# Patient Record
Sex: MALE | Race: WHITE | Employment: OTHER | URBAN - METROPOLITAN AREA
[De-identification: names, ages, dates, MRNs, and addresses within clinical notes are randomized per-mention and may not be internally consistent; named-entity substitution may affect disease eponyms.]

---

## 2017-01-17 ENCOUNTER — ANTI-COAG VISIT (OUTPATIENT)
Dept: PHARMACY | Facility: CLINIC | Age: 82
End: 2017-01-17

## 2017-01-17 LAB — INR BLD: 3.1

## 2017-02-28 ENCOUNTER — ANTI-COAG VISIT (OUTPATIENT)
Dept: PHARMACY | Facility: CLINIC | Age: 82
End: 2017-02-28

## 2017-02-28 LAB — INR BLD: 2.2

## 2017-04-11 ENCOUNTER — ANTI-COAG VISIT (OUTPATIENT)
Dept: PHARMACY | Facility: CLINIC | Age: 82
End: 2017-04-11

## 2017-04-11 LAB — INR BLD: 2.2

## 2017-05-22 ENCOUNTER — INITIAL CONSULT (OUTPATIENT)
Dept: SURGERY | Age: 82
End: 2017-05-22

## 2017-05-22 VITALS
DIASTOLIC BLOOD PRESSURE: 80 MMHG | BODY MASS INDEX: 40.52 KG/M2 | WEIGHT: 283 LBS | SYSTOLIC BLOOD PRESSURE: 134 MMHG | HEIGHT: 70 IN

## 2017-05-22 DIAGNOSIS — D48.5 NEOPLASM OF UNCERTAIN BEHAVIOR OF SKIN: Primary | ICD-10-CM

## 2017-05-22 PROCEDURE — 99202 OFFICE O/P NEW SF 15 MIN: CPT | Performed by: SURGERY

## 2017-05-23 ENCOUNTER — ANTI-COAG VISIT (OUTPATIENT)
Dept: PHARMACY | Facility: CLINIC | Age: 82
End: 2017-05-23

## 2017-05-23 LAB — INR BLD: 2.5

## 2017-05-24 ENCOUNTER — HOSPITAL ENCOUNTER (OUTPATIENT)
Dept: SURGERY | Age: 82
Discharge: HOME | End: 2017-05-24
Attending: SURGERY | Admitting: SURGERY

## 2017-05-24 VITALS
RESPIRATION RATE: 18 BRPM | TEMPERATURE: 97 F | SYSTOLIC BLOOD PRESSURE: 176 MMHG | OXYGEN SATURATION: 99 % | WEIGHT: 283 LBS | DIASTOLIC BLOOD PRESSURE: 72 MMHG | HEART RATE: 67 BPM | BODY MASS INDEX: 39.62 KG/M2 | HEIGHT: 71 IN

## 2017-05-24 PROCEDURE — 12032 INTMD RPR S/A/T/EXT 2.6-7.5: CPT | Performed by: SURGERY

## 2017-05-24 PROCEDURE — 11603 EXC TR-EXT MAL+MARG 2.1-3 CM: CPT | Performed by: SURGERY

## 2017-05-24 RX ORDER — OXYCODONE HYDROCHLORIDE 5 MG/1
TABLET ORAL
Qty: 15 TABLET | Refills: 0 | Status: SHIPPED | OUTPATIENT
Start: 2017-05-24 | End: 2018-05-10

## 2017-05-24 ASSESSMENT — PAIN SCALES - GENERAL: PAINLEVEL_OUTOF10: 0

## 2017-05-25 ENCOUNTER — TELEPHONE (OUTPATIENT)
Dept: SURGERY | Age: 82
End: 2017-05-25

## 2017-06-04 ENCOUNTER — TELEPHONE (OUTPATIENT)
Dept: SURGERY | Age: 82
End: 2017-06-04

## 2017-06-27 ENCOUNTER — ANTI-COAG VISIT (OUTPATIENT)
Dept: PHARMACY | Facility: CLINIC | Age: 82
End: 2017-06-27

## 2017-06-27 LAB — INR BLD: 2

## 2017-08-08 ENCOUNTER — HOSPITAL ENCOUNTER (OUTPATIENT)
Dept: OTHER | Age: 82
Discharge: HOME | End: 2017-08-31
Attending: INTERNAL MEDICINE | Admitting: INTERNAL MEDICINE

## 2017-08-08 ENCOUNTER — ANTI-COAG VISIT (OUTPATIENT)
Dept: PHARMACY | Facility: CLINIC | Age: 82
End: 2017-08-08

## 2017-08-08 LAB — INR BLD: 2.6

## 2017-10-01 ENCOUNTER — HOSPITAL ENCOUNTER (OUTPATIENT)
Dept: OTHER | Age: 82
Discharge: HOME | End: 2017-10-31
Attending: INTERNAL MEDICINE | Admitting: INTERNAL MEDICINE

## 2017-10-03 ENCOUNTER — ANTI-COAG VISIT (OUTPATIENT)
Dept: PHARMACY | Facility: CLINIC | Age: 82
End: 2017-10-03

## 2017-10-03 LAB — INR BLD: 2.2

## 2017-10-03 NOTE — PROGRESS NOTES
Patient presents with history of A-Fib and recurrent DVT , on long term anticoagulation with warfarin. He is here for anticoagulation monitoring and adjustment. 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 Denies bleeding/bruising  Denies blood in urine/stool  No changes in Rx/OTC/Herbal supplementation. Patient is not a smoker, he did state that he drinks scotch every night. Pt denies any missed doses. Pt indicates that he is not a big vegetable eater. INR 2.2  is within acceptable therapeutic range (goal range of 2-3). Recommend to continue 3 mg daily, except 2 mg on Mondays. He has Warfarin 4, 3, and 2 mg tablets at home. Will continue to monitor and check INR in 6 weeks, per pt request.  Dosing card with dose and next appt time was given.   Return to clinic: 11/14 @ 1:00 pm    Chas May PharmD 10/3/2017 12:55 PM

## 2017-10-05 ENCOUNTER — HOSPITAL ENCOUNTER (OUTPATIENT)
Dept: WOUND CARE | Age: 82
Discharge: HOME | End: 2017-10-05
Attending: SURGERY | Admitting: SURGERY

## 2017-10-05 VITALS
RESPIRATION RATE: 24 BRPM | TEMPERATURE: 98.4 F | HEART RATE: 55 BPM | HEIGHT: 70 IN | BODY MASS INDEX: 41.7 KG/M2 | DIASTOLIC BLOOD PRESSURE: 81 MMHG | WEIGHT: 291.25 LBS | SYSTOLIC BLOOD PRESSURE: 185 MMHG

## 2017-10-05 DIAGNOSIS — L97.909 ARTERIAL LEG ULCER (HCC): ICD-10-CM

## 2017-10-05 DIAGNOSIS — L97.909 VENOUS STASIS ULCERS (HCC): Chronic | ICD-10-CM

## 2017-10-05 DIAGNOSIS — I83.009 VENOUS STASIS ULCERS (HCC): Chronic | ICD-10-CM

## 2017-10-05 DIAGNOSIS — I50.9 CHRONIC CONGESTIVE HEART FAILURE, UNSPECIFIED CONGESTIVE HEART FAILURE TYPE: Primary | ICD-10-CM

## 2017-10-05 DIAGNOSIS — I87.2 VENOUS INSUFFICIENCY: ICD-10-CM

## 2017-10-05 NOTE — H&P
remainder of the ROS was reviewed and is negative. Objective:     Vitals:    10/05/17 1245   BP: (!) 185/81   Pulse: 55   Resp: 24   Temp: 98.4 °F (36.9 °C)   TempSrc: Oral   Weight: 291 lb 4 oz (132.1 kg)   Height: 5' 10\" (1.778 m)       Constitutional:  well-developed, well-nourished  Psychiatric:  oriented to person, place and time; mood and affect appropriate for the situation   Eyes:  pupils equal, round and reactive to light; sclerae anicteric, conjunctivae not pale  Cardiovascular:  bilateral pedal pulses palpable; no lower extremity edema  Lymphatic:  no inguinal or popliteal adenopathy   Musculoskeletal:  no clubbing, cyanosis or petechiae; RLE and LLE with no gross effusions, joint misalignment or acute arthritis  Meron-ulcer skin:  Intact, Induration, Maceration and Erythema. Ulcer(s):  open, erythema, induration and good granulation. Today's ulcer measurements are in the electronic flowsheet. Photos also saved in electronic chart. Lab Results   Component Value Date    LABALBU 4.0 01/14/2016     Lab Results   Component Value Date    CREATININE 1.9 (H) 01/15/2016     Lab Results   Component Value Date    HGB 13.5 01/15/2016     Lab Results   Component Value Date    LABA1C 5.3 05/17/2011     Assessment:     Patient Active Problem List   Diagnosis Code    Status post hip replacement Z96.649    CHF (congestive heart failure) ([facility]) I50.9    Atrial fibrillation ([facility]) I48.91    Cellulitis L03.90    Long term current use of anticoagulant therapy Z79.01    DVT (deep venous thrombosis) ([facility]) I82.409    Atrial flutter with rapid ventricular response ([facility]) I48.92    Anticoagulated on Coumadin Z51.81, Z79.01    CHF exacerbation ([facility]) I50.9    Renal insufficiency N28.9    Neoplasm of uncertain behavior of skin D48.5       Assessment of today's active condition(s): Shira Avila  Factors contributing to occurrence and/or persistence of the chronic ulcer include edema, venous stasis, lymphedema and decreased mobility. Medical necessity of today's visit is shown by the above documentation. Procedure note: None, There is no necrotic tissue       Discharge plan:     Treatment in the wound care center today:  . Home treatment: good handwashing before and after any dressing changes. Cleanse ulcer with saline or soap & water before dressing change. May use Vaseline (petrolatum), Aquaphor, Aveeno, CeraVe, Cetaphil, Eucerin, Lubriderm, etc for dry skin. Dressing type for home: alginate, daily. Written discharge instructions given to patient. Follow up in 1 week.     Electronically signed by Stephon Monae MD on 10/5/2017 at 1:31 PM.

## 2017-10-05 NOTE — IP AVS SNAPSHOT
After Visit Summary  (Discharge Instructions)    Medication List for Home    Based on the information you provided to us as well as any changes during this visit, the following is your updated medication list.  Compare this with your prescription bottles at home. If you have any questions or concerns, contact your primary care physician's office. Daily Medication List (This medication list can be shared with any healthcare provider who is helping you manage your medications)      ASK your doctor about these medications if you have questions        Last Dose    Next Dose Due AM NOON PM NIGHT    aspirin 81 MG tablet   Take 81 mg by mouth daily                                         atenolol 25 MG tablet   Commonly known as:  TENORMIN   Take 25 mg by mouth daily. furosemide 20 MG tablet   Commonly known as:  LASIX   Take 20 mg by mouth 2 times daily                                         oxyCODONE 5 MG immediate release tablet   Commonly known as:  ROXICODONE   Take 1 tablet PO every 4 hours as needed for pain. .                                         simvastatin 20 MG tablet   Commonly known as:  ZOCOR   Take 20 mg by mouth nightly. warfarin 3 MG tablet   Commonly known as:  COUMADIN   Take 1 tablet by mouth daily Or as directed by coumadin clinic                                         warfarin 2 MG tablet   Commonly known as:  COUMADIN   Take 2 or 3 mg daily as directed by coumadin clinic.                                                  Allergies as of 10/5/2017        Reactions    Sulfa Antibiotics     Joint swelling      Immunizations as of 10/5/2017     Name Date Dose VIS Date Route    Influenza Virus Vaccine 9/20/2012 0.5 mL 7/26/2011 Intramuscular    Influenza Virus Vaccine 11/2/2011 -- -- --    Pneumococcal Conjugate 7-valent 11/9/2009 -- -- --      Last Vitals          Most Recent Value Please call your providers to confirm appointments. It is important to keep your appointments. Please bring your current insurance card, photo ID, co-pay, and all medication bottles to your appointment. If self-pay, payment is expected at the time of service. Future Appointments     10/5/2017     Imaging:  VL DUP LOWER EXTREMITY ARTERIES BILATERAL        10/5/2017     Imaging:  VL Extremity Venous Bilateral        11/14/2017 1:00 PM     Appointment with Franc Garrett at [address] [place] [address] ([phone])   [address] [place] [address], [address]  [address] [place] [address]         Preventive Care        Date Due    Tetanus Combination Vaccine (1 - Tdap) 1/24/1955    Zoster Vaccine 1/24/1996    Yearly Flu Vaccine (1) 9/1/2017                 Care Plan Once You Return Home    This section includes instructions you will need to follow once you leave the hospital.  Your care team will discuss these with you, so you and those caring for you know how to best care for your health needs at home. This section may also include educational information about certain health topics that may be of help to you. Discharge [facility] Physician Orders and Discharge [facility]  [address], [address]  [place], [facility]  Telephone: [phone]      Fax: [phone]          Your wound-care supplies will be provided by: [address]    Please begin [address] [employer] [facility] Today. Wound care nurse, please fax face sheet, wound measurements, and AVS today     . NAME:  Tina Solares   YOB: 1935  PRIMARY DIAGNOSIS FOR WOUND CARE CENTER:  Venous Insufficiency .     ANESTHETIC APPLIED IN [address] [place] [address],[address] TODAY:None    Cellular or tissue product history, if applicable:      None    Prior testing or prescriptions, if applicable:      None      Wound cleansing: Do not scrub or use excessive force. Wash hands with soap and water before and after dressing changes. Prior to applying a clean dressing, cleanse wound with normal saline, wound cleanser, or mild soap and water. Ask your physician or nurse before getting the wound(s) wet in the shower. Wound care for home:     BOTH LOWER LEGS:                (Open wound of right lower leg)              Apply Aquaphor or moisturizing lotion to both legs/feet, not between toes,   Calcium Alginate with Silver to wound               Dry gauze squares              Multiple ABD pads              Kerlix roll gauze              Once daily    Apply double layer, low compression Tubigrips to both legs, toes to knees,  Apply in AM, remove at bedtime    Substituted dressings applied in the [address] [place] [address],[address] today, if applicable:     Aquacel Ag for Calcium Alginate with Silver    New orders for this week (labs, imaging, medications, etc.):    Please schedule both venous duplex and arterial duplex -Bilateral Lower extremities, at [facility] the patient to bring dressing to wrap legs after the exams are performed. DX:  Non healing wound, leg(s), venous insufficiency, r/o arterial disease        Dr Santos Simmons DPM   for routine foot care and toe nail cutting    [facility] [address]   [address] [facility] [address]    Phone [phone]  Fax [phone]    Office hours: Tuesday and Wednesday afternoon 1-4pm                         Friday 9am - 4pm    Additional instructions for specific diagnoses:     General comments for venous / lymphedema ulcers: *  Elevate your legs to the level of your heart for at least 30 minutes, several times daily. *  Walk as much as you can tolerate. Avoid standing for long periods of time, but if you must stand, regularly do heel-raises and calf-pump exercises. *  If you have compression wraps designed to remain in place all week, be sure to keep them from getting wet.

## 2017-10-05 NOTE — IP AVS SNAPSHOT
Patient Information     Patient Name THUY Vasquez 1935         This is your updated medication list to keep with you all times      ASK your doctor about these medications     aspirin 81 MG tablet       atenolol 25 MG tablet   Commonly known as:  TENORMIN       furosemide 20 MG tablet   Commonly known as:  LASIX       oxyCODONE 5 MG immediate release tablet   Commonly known as:  ROXICODONE   Take 1 tablet PO every 4 hours as needed for pain. .       simvastatin 20 MG tablet   Commonly known as:  ZOCOR       * warfarin 3 MG tablet   Commonly known as:  COUMADIN   Take 1 tablet by mouth daily Or as directed by coumadin clinic       * warfarin 2 MG tablet   Commonly known as:  COUMADIN   Take 2 or 3 mg daily as directed by coumadin clinic. * Notice: This list has 2 medication(s) that are the same as other medications prescribed for you. Read the directions carefully, and ask your doctor or other care provider to review them with you.

## 2017-10-05 NOTE — IP AVS SNAPSHOT
Patient Information     Patient Name THUY Cantu 1935      WARFARIN INFORMATION       What is the most important information you should know about warfarin? Warfarin is a medicine that you take to prevent blood clots. It is often called a blood thinner. Doctors give warfarin (such as Coumadin) to reduce the risk of blood clots. Warfarin/Coumadin Instructions:  ? It is important to take your anticoagulant medication as instructed, and notify your physician if you are unable to for any reason. ? Complete any blood tests ordered for monitoring your medication; such as PT/INR, so your physician can adjust the dose if necessary. ? Eat a consistent amount of foods with Vitamin K, such as leafy greens. ? Avoid major changes in your dietary habits, or notify your health professional before changing habits. ? Follow up with your health care provider or clinic as directed by your physician for monitoring your anticoagulant medication. ? Diet and medication can affect your anticoagulant and PT/INR blood test.   ? Do not take or discontinue any medication or over-the-counter medication except on the advice of your physician or pharmacist.   ? Anticoagulants can increase the risk of bleeding.

## 2017-10-05 NOTE — IP AVS SNAPSHOT
Patient Information     Patient Name THUY Schneider 1935      Important Information for Heart Failure       If your condition worsens or if you have any concerns, call your doctor or seek emergency medical services (dial 9--) as needed. If you have any of the following symptoms/conditions, call your doctor. Call your primary care physician to obtain results of outstanding lab tests, cultures, x-rays, or other tests. If you have a current diagnosis or history of any of the following, please review the information carefully. HEART FAILURE PATIENTS:   DISCHARGE WEIGHT: Weight: 291 lb 4 oz (132.1 kg)  Record daily weights. Notify your doctor if you have a weight gain 2 pounds in a day, or 3-5 pounds in 1 week. Notify your doctor for increased shortness of breath, or swelling in legs or feet. Follow a low sodium diet. Resume normal activity unless otherwise instructed by your doctor.

## 2017-10-12 ENCOUNTER — HOSPITAL ENCOUNTER (OUTPATIENT)
Dept: WOUND CARE | Age: 82
Discharge: HOME | End: 2017-10-12
Attending: SURGERY | Admitting: SURGERY

## 2017-10-12 ENCOUNTER — HOSPITAL ENCOUNTER (OUTPATIENT)
Dept: VASCULAR LAB | Age: 82
Discharge: HOME | End: 2017-10-12
Attending: SURGERY | Admitting: SURGERY

## 2017-10-12 DIAGNOSIS — I77.1 STRICTURE OF ARTERY (HCC): ICD-10-CM

## 2017-10-12 DIAGNOSIS — I87.2 VENOUS INSUFFICIENCY (CHRONIC) (PERIPHERAL): ICD-10-CM

## 2017-10-12 NOTE — PROGRESS NOTES
Call to patient, no show for 12:45 appointment today. He did arrive for earlier scheduled vascular exam.  Left message on  to return call and confirm appointment for next Thursday.

## 2017-10-19 ENCOUNTER — HOSPITAL ENCOUNTER (OUTPATIENT)
Dept: WOUND CARE | Age: 82
Discharge: HOME | End: 2017-10-19
Attending: SURGERY | Admitting: SURGERY

## 2017-10-19 ENCOUNTER — HOSPITAL ENCOUNTER (OUTPATIENT)
Dept: VASCULAR LAB | Age: 82
Discharge: HOME | End: 2017-10-19
Attending: SURGERY | Admitting: SURGERY

## 2017-10-19 VITALS
HEIGHT: 70 IN | BODY MASS INDEX: 40.83 KG/M2 | SYSTOLIC BLOOD PRESSURE: 167 MMHG | HEART RATE: 60 BPM | DIASTOLIC BLOOD PRESSURE: 70 MMHG | WEIGHT: 285.2 LBS | TEMPERATURE: 98.2 F | RESPIRATION RATE: 24 BRPM

## 2017-10-19 DIAGNOSIS — I77.1 STRICTURE OF ARTERY (HCC): ICD-10-CM

## 2017-10-19 DIAGNOSIS — I89.0 LYMPHEDEMA: ICD-10-CM

## 2017-10-19 DIAGNOSIS — L97.921 CHRONIC ULCER OF LEFT LEG, LIMITED TO BREAKDOWN OF SKIN (HCC): ICD-10-CM

## 2017-10-19 PROCEDURE — 99214 OFFICE O/P EST MOD 30 MIN: CPT | Performed by: INTERNAL MEDICINE

## 2017-10-19 NOTE — PLAN OF CARE
Problem: Impaired Skin Integrity  Goal: Wound size and drainage will decrease, surrounding tissue healthy and intact.   Wound not debrided today, compression garments ordered per Margret, continue elevation of BLE, f/u x 1 week, MD orders/D/C instructions reviewed with patient, all questions answered; copy of instructions given to patient

## 2017-10-26 ENCOUNTER — HOSPITAL ENCOUNTER (OUTPATIENT)
Dept: WOUND CARE | Age: 82
Discharge: HOME | End: 2017-10-26
Attending: SURGERY | Admitting: SURGERY

## 2017-10-26 VITALS
TEMPERATURE: 98.4 F | WEIGHT: 282 LBS | BODY MASS INDEX: 40.37 KG/M2 | SYSTOLIC BLOOD PRESSURE: 189 MMHG | HEIGHT: 70 IN | DIASTOLIC BLOOD PRESSURE: 85 MMHG | RESPIRATION RATE: 20 BRPM | HEART RATE: 58 BPM

## 2017-10-27 PROBLEM — L97.921 CHRONIC ULCER OF LEFT LEG, LIMITED TO BREAKDOWN OF SKIN (HCC): Status: ACTIVE | Noted: 2017-10-27

## 2017-10-27 PROBLEM — I83.009 VENOUS STASIS ULCERS (HCC): Chronic | Status: RESOLVED | Noted: 2017-10-05 | Resolved: 2017-10-27

## 2017-10-27 PROBLEM — L97.909 VENOUS STASIS ULCERS (HCC): Chronic | Status: RESOLVED | Noted: 2017-10-05 | Resolved: 2017-10-27

## 2017-10-27 PROBLEM — I89.0 LYMPHEDEMA: Status: ACTIVE | Noted: 2017-10-27

## 2017-10-27 NOTE — PROGRESS NOTES
[facility] Progress Note    Jackie Borges     : 1935    DATE OF VISIT:  10/26/2017    Subjective:     Jackie Borges is a 81 y.o. male who has a venous ulcer located on the both legs    Significant symptoms or pertinent wound history since last visit:-  The wounds are healed. The legs are less swollen . Time was spent to reiterate preventive measures. We offer lymphatic drainage therapy he will think about it and will call us if he decided to go do it. Additional ulcer(s) noted? no.     His current medication list consists of aspirin, atenolol, furosemide, oxyCODONE, simvastatin, and warfarin. Allergies: Sulfa antibiotics    Objective:     Vitals:    10/26/17 1305   BP: (!) 189/85   Pulse: 58   Resp: 20   Temp: 98.4 °F (36.9 °C)   TempSrc: Oral   Weight: 282 lb (127.9 kg)   Height: 5' 10\" (1.778 m)       Meron-ulcer skin: Intact, Induration, Brown and cobblestoning. Jass Joe Ulcer(s): healed. Pre-debridement wound measurements are in the wound documentation flowsheet. Photos also saved in electronic chart. Assessment:     Patient Active Problem List   Diagnosis Code    Status post hip replacement Z96.649    CHF (congestive heart failure) ([facility]) I50.9    Atrial fibrillation ([facility]) I48.91    Cellulitis L03.90    Long term current use of anticoagulant therapy Z79.01    DVT (deep venous thrombosis) ([facility]) I82.409    Atrial flutter with rapid ventricular response ([facility]) I48.92    Anticoagulated on Coumadin Z51.81, Z79.01    CHF exacerbation ([facility]) I50.9    Renal insufficiency N28.9    Neoplasm of uncertain behavior of skin D48.5    Venous stasis ulcers ([facility]) I83.009, L97.909    Arterial leg ulcer ([facility] L97.909    Venous insufficiency I87.2       Assessment of today's active condition(s): Jass Joe Factors contributing to occurrence and/or persistence of the chronic ulcer include edema, venous stasis, lymphedema, decreased mobility, obesity and decreased tissue oxygenation.  Medical

## 2017-10-27 NOTE — PROGRESS NOTES
[address] [facility] Progress Note    Roshan Senior     : 1935    DATE OF VISIT:  10/19/2017    Subjective:     Roshan Senior is a 81 y.o. male who has a venous and lymphedema ulcer located on the left leg. Current complaint of pain in this ulcer? yes. Quality of pain: aching and burning  Timing: intermittent, stable  Severity: mild  Associated Signs/Symptoms:  edema and drainage  Other significant symptoms or pertinent ulcer history: swelling not much different this week, but wound drainage less, no pruritus, no fever. Additional ulcer(s) noted? no.  Right side healed. Mr. Dianna Hairston has a past medical history of Arthritis; Atrial flutter with rapid ventricular response ([address] Blood transfusion; Cellulitis; CHF (congestive heart failure) (HCC); CHF exacerbation ([address] DVT (deep venous thrombosis) ([address] Gout; Hyperlipidemia; Hypertension; Irregular heart beat; and Prostate cancer ([facility] He has a past surgical history that includes ostate surgery; Cholecystectomy; joint replacement (11); eye surgery (13); and other surgical history (2017). His family history includes Arthritis in his sister; Asthma in his brother; Heart Disease in his mother. Mr. Dianna Hairston reports that he quit smoking about 47 years ago. His smoking use included Pipe. He quit smokeless tobacco use about 55 years ago. His smokeless tobacco use included Chew. He reports that he does not drink alcohol or use drugs. His current medication list consists of aspirin, atenolol, furosemide, oxyCODONE, simvastatin, and warfarin. Allergies: Sulfa antibiotics    Pertinent items from the review of systems are discussed in the HPI; the remainder of the ROS was reviewed and is negative.      Objective:     Vitals:    10/19/17 1356 10/19/17 1414   BP: (!) 199/82 (!) 167/70   Pulse: 64 60   Resp: 24    Temp: 98.2 °F (36.8 °C)    TempSrc: Oral    Weight: 285 lb 3.2 oz (129.4 kg)    Height: 5' 10\" (1.778 m)        Constitutional:  well-developed, well-nourished, overweight, fatigued, NAD  Psychiatric:  oriented to person, place and time; mood and affect appropriate for the situation   Eyes:  pupils equal, round and reactive to light; sclerae anicteric, conjunctivae not pale  Cardiovascular:  bilateral pedal pulses palpable; severe B/L stage 3 lower extremity lymphedema  Lymphatic:  no inguinal or popliteal adenopathy, no angitis or cellulitis  Musculoskeletal:  no clubbing, cyanosis or petechiae; RLE and LLE with no gross effusions, joint misalignment or acute arthritis  Meron-ulcer skin:  Induration, Warmth and Pink. Ulcer(s):  clean, open, induration, good granulation, healing and moderate, serous drainage. Today's ulcer measurements are in the electronic flowsheet. Photos also saved in electronic chart. Lab Results   Component Value Date    LABALBU 4.0 01/14/2016     Lab Results   Component Value Date    CREATININE 1.9 (H) 01/15/2016     Lab Results   Component Value Date    HGB 13.5 01/15/2016     Lab Results   Component Value Date    LABA1C 5.3 05/17/2011     Arterial Doppler -- essentially normal.    Venous Doppler --  1. Suboptimal lower venous exam due to depth secondary to patient body habitus.   2. Within the limits of this exam, there is no evidence of acute deep or superficial venous thrombosis in the lower extremities bilaterally.   3. There is chronic partially occluding deep venous thrombosis in the bilateral popliteal veins. There is chronic partially occluding superficial venous thrombosis in the right small saphenous vein.   4. There is evidence of deep venous insufficiency involving the left common femoral and popliteal veins.  There is no evidence of deep venous insufficiency in the right lower extremity.   5. There is no evidence of superficial venous insufficiency in the lower extremities bilaterally.   6. There is no evidence of incompetent perforators in the bilateral lower extremities. Assessment:     Patient Active Problem List   Diagnosis Code    Status post hip replacement Z96.649    CHF (congestive heart failure) ([facility]) I50.9    Atrial fibrillation ([facility]) I48.91    DVT (deep venous thrombosis) ([facility]) I82.409    Anticoagulated on Coumadin Z51.81, Z79.01    Renal insufficiency N28.9    Neoplasm of uncertain behavior of skin D48.5    Venous insufficiency I87.2    Lymphedema I89.0    Chronic ulcer of left leg, limited to breakdown of skin ([facility] [address] L97.921       Assessment of today's active condition(s): venous insufficiency, severe secondary lymphedema, healed right and healing left lower leg venous / lymphedema ulcers, no infection or ischemia -- long-term edema control is going to be a challenge. Factors contributing to occurrence and/or persistence of the chronic ulcer include venous stasis, lymphedema, decreased mobility and obesity. Medical necessity of today's visit is shown by the above documentation. Sharp debridement is not indicated today, based upon the exam findings in the wound(s) above. Discharge plan:     Treatment in the wound care center today: Wound 10/05/17 5, right lower leg, venous, partial thickness, onset 9/1/17, gradually appeared healed 10/19/17-Dressing/Treatment: Other (Comment) (dble layer j tubigrip)  Wound 10/19/17 # 6 , Left Lower leg, venous insuff, partial thickness, onset 10/18/17, gradually appeared healed 10/26/17-Dressing/Treatment: Other (Comment) (auqacel ag,mepilex border,dble layer j tubigrip). I reminded the patient of the importance of weight management and smoking cessation, if applicable; also encouraged ambulation as tolerated, additional lower extremity exercises as instructed in our education sheet, leg elevation when at rest, and compliance with any recommended dietary, diuretic and compression therapies.  Discussed various options for long-term compression, and he'd like to try one of the Velcro-based garments, which I do think is a wise choice as opposed to stockings. If ulcers recur or he has further complications of his lymphedema, would recommend that he attend our outpatient lymphedema clinic (PT-OT) 2-3x per week; due to cost and travel, he'd like to hold off on that for now. Home treatment: good handwashing before and after any dressing changes. Cleanse ulcer with saline or soap & water before dressing change. May use Vaseline (petrolatum), Aquaphor, Aveeno, CeraVe, Cetaphil, Eucerin, Lubriderm, etc for dry skin. Dressing type for home: Other: as above, daily. Written discharge instructions given to patient. Follow up in 1 week.     Electronically signed by Spring Henry MD on 10/27/2017 at 7:14 PM.

## 2017-11-01 ENCOUNTER — HOSPITAL ENCOUNTER (OUTPATIENT)
Dept: OTHER | Age: 82
Discharge: HOME | End: 2017-11-30
Attending: INTERNAL MEDICINE | Admitting: INTERNAL MEDICINE

## 2017-11-14 ENCOUNTER — ANTI-COAG VISIT (OUTPATIENT)
Dept: PHARMACY | Facility: CLINIC | Age: 82
End: 2017-11-14

## 2017-11-14 LAB — INR BLD: 2

## 2017-12-01 ENCOUNTER — HOSPITAL ENCOUNTER (OUTPATIENT)
Dept: OTHER | Age: 82
Discharge: HOME | End: 2017-12-31
Attending: INTERNAL MEDICINE | Admitting: INTERNAL MEDICINE

## 2017-12-26 ENCOUNTER — ANTI-COAG VISIT (OUTPATIENT)
Dept: PHARMACY | Facility: CLINIC | Age: 82
End: 2017-12-26

## 2017-12-26 LAB — INR BLD: 1.7

## 2018-01-01 ENCOUNTER — HOSPITAL ENCOUNTER (OUTPATIENT)
Dept: OTHER | Age: 83
Discharge: HOME | End: 2018-01-31
Attending: INTERNAL MEDICINE | Admitting: INTERNAL MEDICINE

## 2018-02-01 ENCOUNTER — HOSPITAL ENCOUNTER (OUTPATIENT)
Dept: OTHER | Age: 83
Discharge: HOME | End: 2018-02-28
Attending: INTERNAL MEDICINE | Admitting: INTERNAL MEDICINE

## 2018-02-06 ENCOUNTER — ANTI-COAG VISIT (OUTPATIENT)
Dept: PHARMACY | Facility: CLINIC | Age: 83
End: 2018-02-06

## 2018-02-06 LAB — INR BLD: 2.5

## 2018-02-06 NOTE — PROGRESS NOTES
Patient presents with history of A-Fib and recurrent DVT , on long term anticoagulation with warfarin. He is here for anticoagulation monitoring and adjustment. 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 Denies bleeding/bruising  Denies blood in urine/stool  No changes in Rx/OTC/Herbal supplementation. Patient is not a smoker, he did state that he drinks scotch every night. Pt denies any missed doses. Pt indicates that he is not a big vegetable eater. INR 2.5 is within acceptable therapeutic range (goal range of 2-3). Recommend to continue 3 mg daily, except 2 mg on Mondays. He has Warfarin 4, 3, and 2 mg tablets at home. Will continue to monitor and check INR in 6 weeks, per pt request.  Dosing card with dose and next appt time was given.   Return to clinic: 3/20 @ 1:00pm

## 2018-03-01 ENCOUNTER — HOSPITAL ENCOUNTER (OUTPATIENT)
Dept: OTHER | Age: 83
Discharge: HOME | End: 2018-03-31
Attending: INTERNAL MEDICINE | Admitting: INTERNAL MEDICINE

## 2018-03-20 ENCOUNTER — ANTI-COAG VISIT (OUTPATIENT)
Dept: PHARMACY | Facility: CLINIC | Age: 83
End: 2018-03-20

## 2018-03-20 LAB — INR BLD: 2.8

## 2018-04-01 ENCOUNTER — HOSPITAL ENCOUNTER (OUTPATIENT)
Dept: OTHER | Age: 83
Discharge: HOME | End: 2018-04-30
Attending: INTERNAL MEDICINE | Admitting: INTERNAL MEDICINE

## 2018-05-01 ENCOUNTER — ANTI-COAG VISIT (OUTPATIENT)
Dept: PHARMACY | Facility: CLINIC | Age: 83
End: 2018-05-01

## 2018-05-01 ENCOUNTER — HOSPITAL ENCOUNTER (OUTPATIENT)
Dept: OTHER | Age: 83
Discharge: HOME | End: 2018-05-31
Attending: INTERNAL MEDICINE | Admitting: INTERNAL MEDICINE

## 2018-05-01 DIAGNOSIS — I48.20 CHRONIC ATRIAL FIBRILLATION (HCC): ICD-10-CM

## 2018-05-01 LAB — INR BLD: 2.5

## 2018-05-05 ENCOUNTER — HOSPITAL ENCOUNTER (OUTPATIENT)
Dept: OTHER | Age: 83
Discharge: HOME | End: 2018-05-05
Attending: INTERNAL MEDICINE | Admitting: INTERNAL MEDICINE

## 2018-05-05 DIAGNOSIS — R05.9 COUGH: ICD-10-CM

## 2018-05-10 ENCOUNTER — HOSPITAL ENCOUNTER (OUTPATIENT)
Dept: WOUND CARE | Age: 83
Discharge: HOME | End: 2018-05-10
Attending: NURSE PRACTITIONER | Admitting: NURSE PRACTITIONER

## 2018-05-10 VITALS
DIASTOLIC BLOOD PRESSURE: 84 MMHG | WEIGHT: 283.5 LBS | BODY MASS INDEX: 40.59 KG/M2 | TEMPERATURE: 99.4 F | RESPIRATION RATE: 24 BRPM | HEIGHT: 70 IN | HEART RATE: 60 BPM | SYSTOLIC BLOOD PRESSURE: 181 MMHG

## 2018-05-10 DIAGNOSIS — L03.115 CELLULITIS OF RIGHT ANTERIOR LOWER LEG: ICD-10-CM

## 2018-05-10 PROCEDURE — 97597 DBRDMT OPN WND 1ST 20 CM/<: CPT | Performed by: NURSE PRACTITIONER

## 2018-05-10 PROCEDURE — 99214 OFFICE O/P EST MOD 30 MIN: CPT | Performed by: NURSE PRACTITIONER

## 2018-05-10 PROCEDURE — 97598 DBRDMT OPN WND ADDL 20CM/<: CPT | Performed by: NURSE PRACTITIONER

## 2018-05-10 RX ORDER — LIDOCAINE HYDROCHLORIDE 40 MG/ML
SOLUTION TOPICAL ONCE
Status: DISCONTINUED | OUTPATIENT
Start: 2018-05-10 | End: 2018-05-11 | Stop reason: HOSPADM

## 2018-05-17 ENCOUNTER — HOSPITAL ENCOUNTER (OUTPATIENT)
Dept: WOUND CARE | Age: 83
Discharge: HOME | End: 2018-05-17
Attending: NURSE PRACTITIONER | Admitting: NURSE PRACTITIONER

## 2018-05-17 VITALS
SYSTOLIC BLOOD PRESSURE: 176 MMHG | HEART RATE: 57 BPM | RESPIRATION RATE: 20 BRPM | HEIGHT: 70 IN | TEMPERATURE: 97.7 F | WEIGHT: 280.4 LBS | BODY MASS INDEX: 40.14 KG/M2 | DIASTOLIC BLOOD PRESSURE: 78 MMHG

## 2018-05-17 DIAGNOSIS — L97.921 CHRONIC ULCER OF LEFT LEG, LIMITED TO BREAKDOWN OF SKIN (HCC): ICD-10-CM

## 2018-05-17 DIAGNOSIS — L03.115 CELLULITIS OF RIGHT ANTERIOR LOWER LEG: ICD-10-CM

## 2018-05-17 PROCEDURE — 97597 DBRDMT OPN WND 1ST 20 CM/<: CPT | Performed by: NURSE PRACTITIONER

## 2018-05-17 RX ORDER — LIDOCAINE 40 MG/G
CREAM TOPICAL ONCE
Status: DISCONTINUED | OUTPATIENT
Start: 2018-05-17 | End: 2018-05-18 | Stop reason: HOSPADM

## 2018-05-19 PROBLEM — L97.921 CHRONIC ULCER OF LEFT LEG, LIMITED TO BREAKDOWN OF SKIN (HCC): Status: ACTIVE | Noted: 2018-05-19

## 2018-05-19 PROBLEM — L97.911 CHRONIC ULCER OF RIGHT LEG, LIMITED TO BREAKDOWN OF SKIN (HCC): Status: ACTIVE | Noted: 2017-10-27

## 2018-05-24 ENCOUNTER — HOSPITAL ENCOUNTER (OUTPATIENT)
Dept: WOUND CARE | Age: 83
Discharge: HOME | End: 2018-05-24
Attending: NURSE PRACTITIONER | Admitting: NURSE PRACTITIONER

## 2018-05-24 VITALS
HEART RATE: 55 BPM | RESPIRATION RATE: 20 BRPM | SYSTOLIC BLOOD PRESSURE: 193 MMHG | BODY MASS INDEX: 37.48 KG/M2 | HEIGHT: 70 IN | TEMPERATURE: 98.5 F | DIASTOLIC BLOOD PRESSURE: 80 MMHG | WEIGHT: 261.8 LBS

## 2018-05-24 DIAGNOSIS — L03.115 CELLULITIS OF RIGHT ANTERIOR LOWER LEG: ICD-10-CM

## 2018-05-24 DIAGNOSIS — L97.921 CHRONIC ULCER OF LEFT LEG, LIMITED TO BREAKDOWN OF SKIN (HCC): ICD-10-CM

## 2018-05-24 PROCEDURE — 99213 OFFICE O/P EST LOW 20 MIN: CPT | Performed by: NURSE PRACTITIONER

## 2018-06-01 ENCOUNTER — HOSPITAL ENCOUNTER (OUTPATIENT)
Dept: OTHER | Age: 83
Discharge: HOME | End: 2018-06-30
Attending: INTERNAL MEDICINE | Admitting: INTERNAL MEDICINE

## 2018-06-12 ENCOUNTER — ANTI-COAG VISIT (OUTPATIENT)
Dept: PHARMACY | Facility: CLINIC | Age: 83
End: 2018-06-12

## 2018-06-12 DIAGNOSIS — I48.20 CHRONIC ATRIAL FIBRILLATION (HCC): ICD-10-CM

## 2018-06-12 LAB — INR BLD: 3.7

## 2018-07-01 ENCOUNTER — HOSPITAL ENCOUNTER (OUTPATIENT)
Dept: OTHER | Age: 83
Discharge: HOME | End: 2018-07-01
Attending: INTERNAL MEDICINE | Admitting: INTERNAL MEDICINE

## 2018-07-03 ENCOUNTER — ANTI-COAG VISIT (OUTPATIENT)
Dept: PHARMACY | Facility: CLINIC | Age: 83
End: 2018-07-03

## 2018-07-03 DIAGNOSIS — I48.20 CHRONIC ATRIAL FIBRILLATION (HCC): ICD-10-CM

## 2018-07-03 LAB — INR BLD: 4.3

## 2018-07-19 ENCOUNTER — ANTI-COAG VISIT (OUTPATIENT)
Dept: PHARMACY | Age: 83
End: 2018-07-19
Payer: MEDICARE

## 2018-07-19 DIAGNOSIS — I48.20 CHRONIC ATRIAL FIBRILLATION (HCC): ICD-10-CM

## 2018-07-19 PROCEDURE — 85610 PROTHROMBIN TIME: CPT | Performed by: PHARMACIST

## 2018-07-19 PROCEDURE — 99211 OFF/OP EST MAY X REQ PHY/QHP: CPT | Performed by: PHARMACIST

## 2018-08-02 ENCOUNTER — ANTI-COAG VISIT (OUTPATIENT)
Dept: PHARMACY | Age: 83
End: 2018-08-02
Payer: MEDICARE

## 2018-08-02 DIAGNOSIS — I48.20 CHRONIC ATRIAL FIBRILLATION (HCC): ICD-10-CM

## 2018-08-02 LAB — INR BLD: 3.8

## 2018-08-02 PROCEDURE — 99211 OFF/OP EST MAY X REQ PHY/QHP: CPT | Performed by: PHARMACIST

## 2018-08-02 PROCEDURE — 85610 PROTHROMBIN TIME: CPT | Performed by: PHARMACIST

## 2018-08-21 ENCOUNTER — ANTI-COAG VISIT (OUTPATIENT)
Dept: PHARMACY | Age: 83
End: 2018-08-21
Payer: MEDICARE

## 2018-08-21 DIAGNOSIS — I48.20 CHRONIC ATRIAL FIBRILLATION (HCC): ICD-10-CM

## 2018-08-21 LAB — INTERNATIONAL NORMALIZATION RATIO, POC: 2.9

## 2018-08-21 PROCEDURE — 99211 OFF/OP EST MAY X REQ PHY/QHP: CPT | Performed by: PHARMACIST

## 2018-08-21 PROCEDURE — 85610 PROTHROMBIN TIME: CPT | Performed by: PHARMACIST

## 2018-09-20 ENCOUNTER — ANTI-COAG VISIT (OUTPATIENT)
Dept: PHARMACY | Age: 83
End: 2018-09-20
Payer: MEDICARE

## 2018-09-20 DIAGNOSIS — I48.20 CHRONIC ATRIAL FIBRILLATION (HCC): ICD-10-CM

## 2018-09-20 LAB — INTERNATIONAL NORMALIZATION RATIO, POC: 2.7

## 2018-09-20 PROCEDURE — 85610 PROTHROMBIN TIME: CPT | Performed by: PHARMACIST

## 2018-09-20 PROCEDURE — 99211 OFF/OP EST MAY X REQ PHY/QHP: CPT | Performed by: PHARMACIST

## 2018-09-20 RX ORDER — WARFARIN SODIUM 2 MG/1
TABLET ORAL
Qty: 90 TABLET | Refills: 3 | Status: SHIPPED | OUTPATIENT
Start: 2018-09-20

## 2018-10-18 ENCOUNTER — ANTI-COAG VISIT (OUTPATIENT)
Dept: PHARMACY | Age: 83
End: 2018-10-18
Payer: MEDICARE

## 2018-10-18 DIAGNOSIS — I48.20 CHRONIC ATRIAL FIBRILLATION (HCC): ICD-10-CM

## 2018-10-18 LAB — INTERNATIONAL NORMALIZATION RATIO, POC: 3.4

## 2018-10-18 PROCEDURE — 99211 OFF/OP EST MAY X REQ PHY/QHP: CPT | Performed by: PHARMACIST

## 2018-10-18 PROCEDURE — 85610 PROTHROMBIN TIME: CPT | Performed by: PHARMACIST

## 2018-11-29 ENCOUNTER — ANTI-COAG VISIT (OUTPATIENT)
Dept: PHARMACY | Age: 83
End: 2018-11-29
Payer: MEDICARE

## 2018-11-29 LAB — INTERNATIONAL NORMALIZATION RATIO, POC: 1.9

## 2018-11-29 PROCEDURE — 85610 PROTHROMBIN TIME: CPT | Performed by: PHARMACIST

## 2018-11-29 PROCEDURE — 99211 OFF/OP EST MAY X REQ PHY/QHP: CPT | Performed by: PHARMACIST

## 2019-01-01 ENCOUNTER — HOSPITAL ENCOUNTER (EMERGENCY)
Age: 84
Discharge: HOME | DRG: 640 | End: 2019-12-11
Attending: EMERGENCY MEDICINE
Payer: MEDICARE

## 2019-01-01 ENCOUNTER — NURSE ONLY (OUTPATIENT)
Dept: CARDIOLOGY CLINIC | Age: 84
End: 2019-01-01

## 2019-01-01 ENCOUNTER — ANTI-COAG VISIT (OUTPATIENT)
Dept: PHARMACY | Age: 84
End: 2019-01-01
Payer: MEDICARE

## 2019-01-01 ENCOUNTER — APPOINTMENT (OUTPATIENT)
Dept: CARDIAC CATH/INVASIVE PROCEDURES | Age: 84
DRG: 246 | End: 2019-01-01
Attending: INTERNAL MEDICINE
Payer: MEDICARE

## 2019-01-01 ENCOUNTER — HOSPITAL ENCOUNTER (OUTPATIENT)
Dept: CARDIAC CATH/INVASIVE PROCEDURES | Age: 84
Setting detail: OBSERVATION
Discharge: HOME | End: 2019-12-28
Attending: INTERNAL MEDICINE | Admitting: INTERNAL MEDICINE
Payer: MEDICARE

## 2019-01-01 ENCOUNTER — APPOINTMENT (OUTPATIENT)
Dept: GENERAL RADIOLOGY | Age: 84
DRG: 640 | End: 2019-01-01
Payer: MEDICARE

## 2019-01-01 ENCOUNTER — APPOINTMENT (OUTPATIENT)
Dept: GENERAL RADIOLOGY | Age: 84
End: 2019-01-01
Attending: INTERNAL MEDICINE
Payer: MEDICARE

## 2019-01-01 ENCOUNTER — TELEPHONE (OUTPATIENT)
Dept: WOUND CARE | Age: 84
End: 2019-01-01

## 2019-01-01 ENCOUNTER — ANESTHESIA EVENT (OUTPATIENT)
Dept: CARDIAC CATH/INVASIVE PROCEDURES | Age: 84
DRG: 246 | End: 2019-01-01
Payer: MEDICARE

## 2019-01-01 ENCOUNTER — APPOINTMENT (OUTPATIENT)
Dept: ULTRASOUND IMAGING | Age: 84
DRG: 640 | End: 2019-01-01
Payer: MEDICARE

## 2019-01-01 ENCOUNTER — ANESTHESIA (OUTPATIENT)
Dept: CARDIAC CATH/INVASIVE PROCEDURES | Age: 84
DRG: 246 | End: 2019-01-01
Payer: MEDICARE

## 2019-01-01 ENCOUNTER — HOSPITAL ENCOUNTER (OUTPATIENT)
Dept: WOUND CARE | Age: 84
Discharge: HOME | End: 2019-12-17

## 2019-01-01 ENCOUNTER — HOSPITAL ENCOUNTER (OUTPATIENT)
Dept: WOUND CARE | Age: 84
Discharge: HOME | DRG: 640 | End: 2019-12-10
Payer: MEDICARE

## 2019-01-01 ENCOUNTER — HOSPITAL ENCOUNTER (INPATIENT)
Dept: CARDIAC CATH/INVASIVE PROCEDURES | Age: 84
LOS: 7 days | Discharge: HOME HEALTH | DRG: 246 | End: 2019-12-24
Attending: INTERNAL MEDICINE | Admitting: INTERNAL MEDICINE
Payer: MEDICARE

## 2019-01-01 ENCOUNTER — HOSPITAL ENCOUNTER (INPATIENT)
Age: 84
LOS: 4 days | Discharge: ANOTHER HOSPITAL | DRG: 640 | End: 2019-12-17
Attending: EMERGENCY MEDICINE | Admitting: INTERNAL MEDICINE
Payer: MEDICARE

## 2019-01-01 ENCOUNTER — TELEPHONE (OUTPATIENT)
Dept: CARDIOLOGY CLINIC | Age: 84
End: 2019-01-01

## 2019-01-01 VITALS
HEART RATE: 53 BPM | WEIGHT: 267 LBS | RESPIRATION RATE: 19 BRPM | SYSTOLIC BLOOD PRESSURE: 128 MMHG | BODY MASS INDEX: 38.22 KG/M2 | TEMPERATURE: 97.8 F | DIASTOLIC BLOOD PRESSURE: 56 MMHG | HEIGHT: 70 IN | OXYGEN SATURATION: 99 %

## 2019-01-01 VITALS
HEART RATE: 74 BPM | HEIGHT: 70 IN | DIASTOLIC BLOOD PRESSURE: 64 MMHG | TEMPERATURE: 97.3 F | OXYGEN SATURATION: 96 % | BODY MASS INDEX: 37.91 KG/M2 | WEIGHT: 264.8 LBS | RESPIRATION RATE: 18 BRPM | SYSTOLIC BLOOD PRESSURE: 134 MMHG

## 2019-01-01 VITALS — OXYGEN SATURATION: 100 % | SYSTOLIC BLOOD PRESSURE: 123 MMHG | DIASTOLIC BLOOD PRESSURE: 79 MMHG

## 2019-01-01 VITALS
TEMPERATURE: 98.8 F | WEIGHT: 256 LBS | SYSTOLIC BLOOD PRESSURE: 142 MMHG | RESPIRATION RATE: 16 BRPM | DIASTOLIC BLOOD PRESSURE: 60 MMHG | BODY MASS INDEX: 36.65 KG/M2 | HEART RATE: 52 BPM | HEIGHT: 70 IN

## 2019-01-01 VITALS
SYSTOLIC BLOOD PRESSURE: 119 MMHG | OXYGEN SATURATION: 96 % | DIASTOLIC BLOOD PRESSURE: 62 MMHG | BODY MASS INDEX: 37.81 KG/M2 | WEIGHT: 264.11 LBS | RESPIRATION RATE: 18 BRPM | TEMPERATURE: 98.1 F | HEART RATE: 61 BPM | HEIGHT: 70 IN

## 2019-01-01 VITALS
DIASTOLIC BLOOD PRESSURE: 65 MMHG | BODY MASS INDEX: 36.4 KG/M2 | OXYGEN SATURATION: 96 % | TEMPERATURE: 98.5 F | SYSTOLIC BLOOD PRESSURE: 180 MMHG | WEIGHT: 254.25 LBS | HEIGHT: 70 IN | RESPIRATION RATE: 18 BRPM | HEART RATE: 78 BPM

## 2019-01-01 DIAGNOSIS — I48.91 ATRIAL FIBRILLATION, UNSPECIFIED TYPE (HCC): ICD-10-CM

## 2019-01-01 DIAGNOSIS — E87.5 HYPERKALEMIA: Primary | ICD-10-CM

## 2019-01-01 DIAGNOSIS — Z95.0 PACEMAKER: Primary | ICD-10-CM

## 2019-01-01 DIAGNOSIS — Z79.01 ANTICOAGULATED ON COUMADIN: ICD-10-CM

## 2019-01-01 DIAGNOSIS — I48.91 ATRIAL FIBRILLATION, UNSPECIFIED TYPE (HCC): Primary | ICD-10-CM

## 2019-01-01 DIAGNOSIS — I50.32 CHRONIC DIASTOLIC CONGESTIVE HEART FAILURE (HCC): Primary | ICD-10-CM

## 2019-01-01 DIAGNOSIS — I89.0 LYMPHEDEMA: ICD-10-CM

## 2019-01-01 DIAGNOSIS — L97.911 CHRONIC ULCER OF RIGHT LEG, LIMITED TO BREAKDOWN OF SKIN (HCC): ICD-10-CM

## 2019-01-01 LAB
A/G RATIO: 0.9 (ref 1.1–2.2)
A/G RATIO: 1 (ref 1.1–2.2)
A/G RATIO: 1.1 (ref 1.1–2.2)
A/G RATIO: 1.1 (ref 1.1–2.2)
ALBUMIN SERPL-MCNC: 3 G/DL (ref 3.4–5)
ALBUMIN SERPL-MCNC: 3.1 G/DL (ref 3.4–5)
ALBUMIN SERPL-MCNC: 3.1 G/DL (ref 3.4–5)
ALBUMIN SERPL-MCNC: 3.2 G/DL (ref 3.4–5)
ALBUMIN SERPL-MCNC: 3.2 G/DL (ref 3.4–5)
ALBUMIN SERPL-MCNC: 3.5 G/DL (ref 3.4–5)
ALBUMIN SERPL-MCNC: 3.5 G/DL (ref 3.4–5)
ALBUMIN SERPL-MCNC: 3.9 G/DL (ref 3.4–5)
ALBUMIN SERPL-MCNC: 4 G/DL (ref 3.4–5)
ALP BLD-CCNC: 114 U/L (ref 40–129)
ALP BLD-CCNC: 122 U/L (ref 40–129)
ALP BLD-CCNC: 124 U/L (ref 40–129)
ALP BLD-CCNC: 137 U/L (ref 40–129)
ALT SERPL-CCNC: 13 U/L (ref 10–40)
ALT SERPL-CCNC: 14 U/L (ref 10–40)
ALT SERPL-CCNC: 16 U/L (ref 10–40)
ALT SERPL-CCNC: 17 U/L (ref 10–40)
ANION GAP SERPL CALCULATED.3IONS-SCNC: 10 MMOL/L (ref 3–16)
ANION GAP SERPL CALCULATED.3IONS-SCNC: 12 MMOL/L (ref 3–16)
ANION GAP SERPL CALCULATED.3IONS-SCNC: 12 MMOL/L (ref 3–16)
ANION GAP SERPL CALCULATED.3IONS-SCNC: 13 MMOL/L (ref 3–16)
ANION GAP SERPL CALCULATED.3IONS-SCNC: 14 MMOL/L (ref 3–16)
ANION GAP SERPL CALCULATED.3IONS-SCNC: 14 MMOL/L (ref 3–16)
ANION GAP SERPL CALCULATED.3IONS-SCNC: 15 MMOL/L (ref 3–16)
ANION GAP SERPL CALCULATED.3IONS-SCNC: 8 MMOL/L (ref 3–16)
ANION GAP SERPL CALCULATED.3IONS-SCNC: 8 MMOL/L (ref 3–16)
ANION GAP SERPL CALCULATED.3IONS-SCNC: 9 MMOL/L (ref 3–16)
ANION GAP SERPL CALCULATED.3IONS-SCNC: 9 MMOL/L (ref 3–16)
AST SERPL-CCNC: 20 U/L (ref 15–37)
AST SERPL-CCNC: 22 U/L (ref 15–37)
AST SERPL-CCNC: 27 U/L (ref 15–37)
AST SERPL-CCNC: 27 U/L (ref 15–37)
BACTERIA: ABNORMAL /HPF
BASE EXCESS VENOUS: -2 (ref -3–3)
BASE EXCESS VENOUS: -2 (ref -3–3)
BASE EXCESS VENOUS: 0 (ref -3–3)
BASOPHILS ABSOLUTE: 0 K/UL (ref 0–0.2)
BASOPHILS RELATIVE PERCENT: 0.2 %
BASOPHILS RELATIVE PERCENT: 0.3 %
BASOPHILS RELATIVE PERCENT: 0.3 %
BASOPHILS RELATIVE PERCENT: 0.4 %
BASOPHILS RELATIVE PERCENT: 0.5 %
BASOPHILS RELATIVE PERCENT: 0.5 %
BASOPHILS RELATIVE PERCENT: 0.8 %
BILIRUB SERPL-MCNC: 0.6 MG/DL (ref 0–1)
BILIRUB SERPL-MCNC: 0.7 MG/DL (ref 0–1)
BILIRUBIN URINE: NEGATIVE
BLOOD, URINE: ABNORMAL
BUN BLDV-MCNC: 18 MG/DL (ref 7–20)
BUN BLDV-MCNC: 19 MG/DL (ref 7–20)
BUN BLDV-MCNC: 19 MG/DL (ref 7–20)
BUN BLDV-MCNC: 20 MG/DL (ref 7–20)
BUN BLDV-MCNC: 22 MG/DL (ref 7–20)
BUN BLDV-MCNC: 23 MG/DL (ref 7–20)
BUN BLDV-MCNC: 23 MG/DL (ref 7–20)
BUN BLDV-MCNC: 27 MG/DL (ref 7–20)
BUN BLDV-MCNC: 27 MG/DL (ref 7–20)
BUN BLDV-MCNC: 28 MG/DL (ref 7–20)
BUN BLDV-MCNC: 29 MG/DL (ref 7–20)
BUN BLDV-MCNC: 33 MG/DL (ref 7–20)
BUN BLDV-MCNC: 34 MG/DL (ref 7–20)
BUN BLDV-MCNC: 34 MG/DL (ref 7–20)
BUN BLDV-MCNC: 37 MG/DL (ref 7–20)
CALCIUM SERPL-MCNC: 8.1 MG/DL (ref 8.3–10.6)
CALCIUM SERPL-MCNC: 8.2 MG/DL (ref 8.3–10.6)
CALCIUM SERPL-MCNC: 8.5 MG/DL (ref 8.3–10.6)
CALCIUM SERPL-MCNC: 8.6 MG/DL (ref 8.3–10.6)
CALCIUM SERPL-MCNC: 8.6 MG/DL (ref 8.3–10.6)
CALCIUM SERPL-MCNC: 8.7 MG/DL (ref 8.3–10.6)
CALCIUM SERPL-MCNC: 8.8 MG/DL (ref 8.3–10.6)
CALCIUM SERPL-MCNC: 8.8 MG/DL (ref 8.3–10.6)
CALCIUM SERPL-MCNC: 8.9 MG/DL (ref 8.3–10.6)
CALCIUM SERPL-MCNC: 9 MG/DL (ref 8.3–10.6)
CALCIUM SERPL-MCNC: 9 MG/DL (ref 8.3–10.6)
CALCIUM SERPL-MCNC: 9.3 MG/DL (ref 8.3–10.6)
CALCIUM SERPL-MCNC: 9.4 MG/DL (ref 8.3–10.6)
CALCIUM SERPL-MCNC: 9.7 MG/DL (ref 8.3–10.6)
CALCIUM SERPL-MCNC: 9.7 MG/DL (ref 8.3–10.6)
CASTS: ABNORMAL /LPF
CHLORIDE BLD-SCNC: 100 MMOL/L (ref 99–110)
CHLORIDE BLD-SCNC: 101 MMOL/L (ref 99–110)
CHLORIDE BLD-SCNC: 101 MMOL/L (ref 99–110)
CHLORIDE BLD-SCNC: 102 MMOL/L (ref 99–110)
CHLORIDE BLD-SCNC: 104 MMOL/L (ref 99–110)
CHLORIDE BLD-SCNC: 105 MMOL/L (ref 99–110)
CHLORIDE BLD-SCNC: 105 MMOL/L (ref 99–110)
CHLORIDE BLD-SCNC: 106 MMOL/L (ref 99–110)
CHLORIDE BLD-SCNC: 106 MMOL/L (ref 99–110)
CHLORIDE BLD-SCNC: 107 MMOL/L (ref 99–110)
CHLORIDE BLD-SCNC: 108 MMOL/L (ref 99–110)
CHLORIDE BLD-SCNC: 109 MMOL/L (ref 99–110)
CHLORIDE BLD-SCNC: 110 MMOL/L (ref 99–110)
CHLORIDE BLD-SCNC: 111 MMOL/L (ref 99–110)
CHLORIDE BLD-SCNC: 112 MMOL/L (ref 99–110)
CHLORIDE URINE RANDOM: 156 MMOL/L
CHOLESTEROL, TOTAL: 99 MG/DL (ref 0–199)
CLARITY: CLEAR
CO2: 18 MMOL/L (ref 21–32)
CO2: 19 MMOL/L (ref 21–32)
CO2: 20 MMOL/L (ref 21–32)
CO2: 21 MMOL/L (ref 21–32)
CO2: 21 MMOL/L (ref 21–32)
CO2: 23 MMOL/L (ref 21–32)
CO2: 24 MMOL/L (ref 21–32)
CO2: 25 MMOL/L (ref 21–32)
COLOR: YELLOW
CREAT SERPL-MCNC: 1.2 MG/DL (ref 0.8–1.3)
CREAT SERPL-MCNC: 1.2 MG/DL (ref 0.8–1.3)
CREAT SERPL-MCNC: 1.4 MG/DL (ref 0.8–1.3)
CREAT SERPL-MCNC: 1.5 MG/DL (ref 0.8–1.3)
CREAT SERPL-MCNC: 1.5 MG/DL (ref 0.8–1.3)
CREAT SERPL-MCNC: 1.6 MG/DL (ref 0.8–1.3)
CREAT SERPL-MCNC: 1.9 MG/DL (ref 0.8–1.3)
CREAT SERPL-MCNC: 2.2 MG/DL (ref 0.8–1.3)
CREAT SERPL-MCNC: 2.3 MG/DL (ref 0.8–1.3)
CREAT SERPL-MCNC: 3.2 MG/DL (ref 0.8–1.3)
CREAT SERPL-MCNC: 3.9 MG/DL (ref 0.8–1.3)
CREAT SERPL-MCNC: 4.1 MG/DL (ref 0.8–1.3)
CREATININE URINE: 64.8 MG/DL (ref 39–259)
EKG ATRIAL RATE: 44 BPM
EKG ATRIAL RATE: 51 BPM
EKG ATRIAL RATE: 62 BPM
EKG ATRIAL RATE: 84 BPM
EKG ATRIAL RATE: 86 BPM
EKG DIAGNOSIS: NORMAL
EKG P AXIS: 57 DEGREES
EKG P AXIS: 72 DEGREES
EKG P AXIS: 80 DEGREES
EKG P AXIS: 93 DEGREES
EKG P-R INTERVAL: 192 MS
EKG P-R INTERVAL: 208 MS
EKG P-R INTERVAL: 246 MS
EKG P-R INTERVAL: 250 MS
EKG P-R INTERVAL: 258 MS
EKG Q-T INTERVAL: 422 MS
EKG Q-T INTERVAL: 426 MS
EKG Q-T INTERVAL: 460 MS
EKG Q-T INTERVAL: 466 MS
EKG Q-T INTERVAL: 504 MS
EKG QRS DURATION: 134 MS
EKG QRS DURATION: 138 MS
EKG QRS DURATION: 142 MS
EKG QRS DURATION: 144 MS
EKG QRS DURATION: 150 MS
EKG QTC CALCULATION (BAZETT): 429 MS
EKG QTC CALCULATION (BAZETT): 430 MS
EKG QTC CALCULATION (BAZETT): 466 MS
EKG QTC CALCULATION (BAZETT): 503 MS
EKG QTC CALCULATION (BAZETT): 504 MS
EKG R AXIS: 109 DEGREES
EKG R AXIS: 109 DEGREES
EKG R AXIS: 76 DEGREES
EKG R AXIS: 81 DEGREES
EKG R AXIS: 88 DEGREES
EKG T AXIS: 41 DEGREES
EKG T AXIS: 48 DEGREES
EKG T AXIS: 60 DEGREES
EKG T AXIS: 68 DEGREES
EKG T AXIS: 74 DEGREES
EKG VENTRICULAR RATE: 44 BPM
EKG VENTRICULAR RATE: 51 BPM
EKG VENTRICULAR RATE: 62 BPM
EKG VENTRICULAR RATE: 84 BPM
EKG VENTRICULAR RATE: 86 BPM
EOSINOPHILS ABSOLUTE: 0 K/UL (ref 0–0.6)
EOSINOPHILS ABSOLUTE: 0.1 K/UL (ref 0–0.6)
EOSINOPHILS RELATIVE PERCENT: 0.4 %
EOSINOPHILS RELATIVE PERCENT: 1.2 %
EOSINOPHILS RELATIVE PERCENT: 1.3 %
EOSINOPHILS RELATIVE PERCENT: 1.3 %
EOSINOPHILS RELATIVE PERCENT: 1.4 %
EOSINOPHILS RELATIVE PERCENT: 1.4 %
EOSINOPHILS RELATIVE PERCENT: 1.7 %
EOSINOPHILS RELATIVE PERCENT: 1.8 %
EOSINOPHILS RELATIVE PERCENT: 1.8 %
EOSINOPHILS RELATIVE PERCENT: 1.9 %
GFR AFRICAN AMERICAN: 17
GFR AFRICAN AMERICAN: 18
GFR AFRICAN AMERICAN: 23
GFR AFRICAN AMERICAN: 33
GFR AFRICAN AMERICAN: 35
GFR AFRICAN AMERICAN: 41
GFR AFRICAN AMERICAN: 50
GFR AFRICAN AMERICAN: 54
GFR AFRICAN AMERICAN: 54
GFR AFRICAN AMERICAN: 58
GFR AFRICAN AMERICAN: >60
GFR AFRICAN AMERICAN: >60
GFR NON-AFRICAN AMERICAN: 14
GFR NON-AFRICAN AMERICAN: 15
GFR NON-AFRICAN AMERICAN: 19
GFR NON-AFRICAN AMERICAN: 27
GFR NON-AFRICAN AMERICAN: 29
GFR NON-AFRICAN AMERICAN: 34
GFR NON-AFRICAN AMERICAN: 41
GFR NON-AFRICAN AMERICAN: 45
GFR NON-AFRICAN AMERICAN: 45
GFR NON-AFRICAN AMERICAN: 48
GFR NON-AFRICAN AMERICAN: 58
GFR NON-AFRICAN AMERICAN: 58
GLOBULIN: 3.7 G/DL
GLOBULIN: 3.7 G/DL
GLOBULIN: 4.1 G/DL
GLOBULIN: 4.2 G/DL
GLUCOSE BLD-MCNC: 100 MG/DL (ref 70–99)
GLUCOSE BLD-MCNC: 100 MG/DL (ref 70–99)
GLUCOSE BLD-MCNC: 101 MG/DL (ref 70–99)
GLUCOSE BLD-MCNC: 103 MG/DL (ref 70–99)
GLUCOSE BLD-MCNC: 103 MG/DL (ref 70–99)
GLUCOSE BLD-MCNC: 105 MG/DL (ref 70–99)
GLUCOSE BLD-MCNC: 105 MG/DL (ref 70–99)
GLUCOSE BLD-MCNC: 107 MG/DL (ref 70–99)
GLUCOSE BLD-MCNC: 110 MG/DL (ref 70–99)
GLUCOSE BLD-MCNC: 111 MG/DL (ref 70–99)
GLUCOSE BLD-MCNC: 118 MG/DL (ref 70–99)
GLUCOSE BLD-MCNC: 121 MG/DL (ref 70–99)
GLUCOSE BLD-MCNC: 140 MG/DL (ref 70–99)
GLUCOSE BLD-MCNC: 70 MG/DL (ref 70–99)
GLUCOSE BLD-MCNC: 83 MG/DL (ref 70–99)
GLUCOSE BLD-MCNC: 85 MG/DL (ref 70–99)
GLUCOSE BLD-MCNC: 86 MG/DL (ref 70–99)
GLUCOSE BLD-MCNC: 88 MG/DL (ref 70–99)
GLUCOSE BLD-MCNC: 89 MG/DL (ref 70–99)
GLUCOSE BLD-MCNC: 91 MG/DL (ref 70–99)
GLUCOSE BLD-MCNC: 92 MG/DL (ref 70–99)
GLUCOSE BLD-MCNC: 93 MG/DL (ref 70–99)
GLUCOSE BLD-MCNC: 93 MG/DL (ref 70–99)
GLUCOSE BLD-MCNC: 94 MG/DL (ref 70–99)
GLUCOSE BLD-MCNC: 97 MG/DL (ref 70–99)
GLUCOSE BLD-MCNC: 98 MG/DL (ref 70–99)
GLUCOSE BLD-MCNC: 99 MG/DL (ref 70–99)
GLUCOSE BLD-MCNC: 99 MG/DL (ref 70–99)
GLUCOSE URINE: NEGATIVE MG/DL
HCO3 VENOUS: 23.2 MMOL/L (ref 23–29)
HCO3 VENOUS: 24.2 MMOL/L (ref 23–29)
HCO3 VENOUS: 25.2 MMOL/L (ref 23–29)
HCT VFR BLD CALC: 29.2 % (ref 40.5–52.5)
HCT VFR BLD CALC: 29.3 % (ref 40.5–52.5)
HCT VFR BLD CALC: 30.2 % (ref 40.5–52.5)
HCT VFR BLD CALC: 33 % (ref 40.5–52.5)
HCT VFR BLD CALC: 34.1 % (ref 40.5–52.5)
HCT VFR BLD CALC: 35.3 % (ref 40.5–52.5)
HCT VFR BLD CALC: 35.7 % (ref 40.5–52.5)
HCT VFR BLD CALC: 35.7 % (ref 40.5–52.5)
HCT VFR BLD CALC: 36.8 % (ref 40.5–52.5)
HCT VFR BLD CALC: 37.7 % (ref 40.5–52.5)
HCT VFR BLD CALC: 39.5 % (ref 40.5–52.5)
HCT VFR BLD CALC: 39.6 % (ref 40.5–52.5)
HCT VFR BLD CALC: 41.5 % (ref 40.5–52.5)
HCT VFR BLD CALC: 42.1 % (ref 40.5–52.5)
HDLC SERPL-MCNC: 34 MG/DL (ref 40–60)
HEMOGLOBIN: 10.3 G/DL (ref 13.5–17.5)
HEMOGLOBIN: 10.9 G/DL (ref 13.5–17.5)
HEMOGLOBIN: 11.5 G/DL (ref 13.5–17.5)
HEMOGLOBIN: 11.7 G/DL (ref 13.5–17.5)
HEMOGLOBIN: 12 G/DL (ref 13.5–17.5)
HEMOGLOBIN: 12 G/DL (ref 13.5–17.5)
HEMOGLOBIN: 12.1 G/DL (ref 13.5–17.5)
HEMOGLOBIN: 12.3 G/DL (ref 13.5–17.5)
HEMOGLOBIN: 13.2 G/DL (ref 13.5–17.5)
HEMOGLOBIN: 13.2 G/DL (ref 13.5–17.5)
HEMOGLOBIN: 13.8 G/DL (ref 13.5–17.5)
HEMOGLOBIN: 14 G/DL (ref 13.5–17.5)
HEMOGLOBIN: 9.8 G/DL (ref 13.5–17.5)
HEMOGLOBIN: 9.9 G/DL (ref 13.5–17.5)
INR BLD: 1.14 (ref 0.86–1.14)
INR BLD: 1.2 (ref 0.86–1.14)
INR BLD: 1.23 (ref 0.86–1.14)
INR BLD: 1.26 (ref 0.86–1.14)
INR BLD: 1.29 (ref 0.86–1.14)
INR BLD: 1.45 (ref 0.86–1.14)
INR BLD: 1.6 (ref 0.86–1.14)
INR BLD: 2.95 (ref 0.86–1.14)
INR BLD: 3.63 (ref 0.86–1.14)
INR BLD: 3.65 (ref 0.86–1.14)
INR BLD: 4.02 (ref 0.86–1.14)
INR BLD: 4.45 (ref 0.86–1.14)
INTERNATIONAL NORMALIZATION RATIO, POC: 2.7
INTERNATIONAL NORMALIZATION RATIO, POC: 3.1
KETONES, URINE: NEGATIVE MG/DL
LDL CHOLESTEROL CALCULATED: 46 MG/DL
LEFT VENTRICULAR EJECTION FRACTION HIGH VALUE: 60 %
LEFT VENTRICULAR EJECTION FRACTION MODE: NORMAL
LEUKOCYTE ESTERASE, URINE: NEGATIVE
LV EF: 60 %
LYMPHOCYTES ABSOLUTE: 1.2 K/UL (ref 1–5.1)
LYMPHOCYTES ABSOLUTE: 1.3 K/UL (ref 1–5.1)
LYMPHOCYTES ABSOLUTE: 1.3 K/UL (ref 1–5.1)
LYMPHOCYTES ABSOLUTE: 1.4 K/UL (ref 1–5.1)
LYMPHOCYTES ABSOLUTE: 1.6 K/UL (ref 1–5.1)
LYMPHOCYTES ABSOLUTE: 1.7 K/UL (ref 1–5.1)
LYMPHOCYTES ABSOLUTE: 1.9 K/UL (ref 1–5.1)
LYMPHOCYTES ABSOLUTE: 2.4 K/UL (ref 1–5.1)
LYMPHOCYTES RELATIVE PERCENT: 15.2 %
LYMPHOCYTES RELATIVE PERCENT: 17.4 %
LYMPHOCYTES RELATIVE PERCENT: 21.7 %
LYMPHOCYTES RELATIVE PERCENT: 24.7 %
LYMPHOCYTES RELATIVE PERCENT: 25.6 %
LYMPHOCYTES RELATIVE PERCENT: 26 %
LYMPHOCYTES RELATIVE PERCENT: 29.8 %
LYMPHOCYTES RELATIVE PERCENT: 30.1 %
LYMPHOCYTES RELATIVE PERCENT: 31.5 %
LYMPHOCYTES RELATIVE PERCENT: 39.5 %
MAGNESIUM: 2.2 MG/DL (ref 1.8–2.4)
MCH RBC QN AUTO: 30.3 PG (ref 26–34)
MCH RBC QN AUTO: 30.6 PG (ref 26–34)
MCH RBC QN AUTO: 30.8 PG (ref 26–34)
MCH RBC QN AUTO: 30.9 PG (ref 26–34)
MCH RBC QN AUTO: 31 PG (ref 26–34)
MCH RBC QN AUTO: 31.1 PG (ref 26–34)
MCH RBC QN AUTO: 31.1 PG (ref 26–34)
MCH RBC QN AUTO: 31.2 PG (ref 26–34)
MCH RBC QN AUTO: 31.3 PG (ref 26–34)
MCH RBC QN AUTO: 31.5 PG (ref 26–34)
MCH RBC QN AUTO: 31.8 PG (ref 26–34)
MCHC RBC AUTO-ENTMCNC: 32.7 G/DL (ref 31–36)
MCHC RBC AUTO-ENTMCNC: 32.7 G/DL (ref 31–36)
MCHC RBC AUTO-ENTMCNC: 32.8 G/DL (ref 31–36)
MCHC RBC AUTO-ENTMCNC: 33 G/DL (ref 31–36)
MCHC RBC AUTO-ENTMCNC: 33.2 G/DL (ref 31–36)
MCHC RBC AUTO-ENTMCNC: 33.3 G/DL (ref 31–36)
MCHC RBC AUTO-ENTMCNC: 33.5 G/DL (ref 31–36)
MCHC RBC AUTO-ENTMCNC: 33.6 G/DL (ref 31–36)
MCHC RBC AUTO-ENTMCNC: 33.7 G/DL (ref 31–36)
MCHC RBC AUTO-ENTMCNC: 33.8 G/DL (ref 31–36)
MCHC RBC AUTO-ENTMCNC: 34 G/DL (ref 31–36)
MCHC RBC AUTO-ENTMCNC: 34.1 G/DL (ref 31–36)
MCV RBC AUTO: 92.1 FL (ref 80–100)
MCV RBC AUTO: 92.4 FL (ref 80–100)
MCV RBC AUTO: 92.5 FL (ref 80–100)
MCV RBC AUTO: 92.6 FL (ref 80–100)
MCV RBC AUTO: 92.7 FL (ref 80–100)
MCV RBC AUTO: 92.8 FL (ref 80–100)
MCV RBC AUTO: 93 FL (ref 80–100)
MCV RBC AUTO: 93.2 FL (ref 80–100)
MCV RBC AUTO: 93.4 FL (ref 80–100)
MCV RBC AUTO: 93.6 FL (ref 80–100)
MCV RBC AUTO: 93.6 FL (ref 80–100)
MCV RBC AUTO: 93.7 FL (ref 80–100)
MCV RBC AUTO: 94.4 FL (ref 80–100)
MCV RBC AUTO: 95.4 FL (ref 80–100)
MICROSCOPIC EXAMINATION: YES
MONOCYTES ABSOLUTE: 0.5 K/UL (ref 0–1.3)
MONOCYTES ABSOLUTE: 0.5 K/UL (ref 0–1.3)
MONOCYTES ABSOLUTE: 0.6 K/UL (ref 0–1.3)
MONOCYTES ABSOLUTE: 0.7 K/UL (ref 0–1.3)
MONOCYTES ABSOLUTE: 0.8 K/UL (ref 0–1.3)
MONOCYTES ABSOLUTE: 1.1 K/UL (ref 0–1.3)
MONOCYTES RELATIVE PERCENT: 11 %
MONOCYTES RELATIVE PERCENT: 12 %
MONOCYTES RELATIVE PERCENT: 12.7 %
MONOCYTES RELATIVE PERCENT: 12.8 %
MONOCYTES RELATIVE PERCENT: 13.3 %
MONOCYTES RELATIVE PERCENT: 13.7 %
MONOCYTES RELATIVE PERCENT: 13.8 %
MONOCYTES RELATIVE PERCENT: 8.1 %
MONOCYTES RELATIVE PERCENT: 9.6 %
MONOCYTES RELATIVE PERCENT: 9.8 %
MUCUS: ABNORMAL /LPF
NEUTROPHILS ABSOLUTE: 2.9 K/UL (ref 1.7–7.7)
NEUTROPHILS ABSOLUTE: 3 K/UL (ref 1.7–7.7)
NEUTROPHILS ABSOLUTE: 3.3 K/UL (ref 1.7–7.7)
NEUTROPHILS ABSOLUTE: 3.3 K/UL (ref 1.7–7.7)
NEUTROPHILS ABSOLUTE: 3.4 K/UL (ref 1.7–7.7)
NEUTROPHILS ABSOLUTE: 3.5 K/UL (ref 1.7–7.7)
NEUTROPHILS ABSOLUTE: 5.4 K/UL (ref 1.7–7.7)
NEUTROPHILS ABSOLUTE: 6.3 K/UL (ref 1.7–7.7)
NEUTROPHILS RELATIVE PERCENT: 49 %
NEUTROPHILS RELATIVE PERCENT: 54.7 %
NEUTROPHILS RELATIVE PERCENT: 54.8 %
NEUTROPHILS RELATIVE PERCENT: 58.4 %
NEUTROPHILS RELATIVE PERCENT: 59.7 %
NEUTROPHILS RELATIVE PERCENT: 59.9 %
NEUTROPHILS RELATIVE PERCENT: 62.3 %
NEUTROPHILS RELATIVE PERCENT: 63.2 %
NEUTROPHILS RELATIVE PERCENT: 70 %
NEUTROPHILS RELATIVE PERCENT: 71.3 %
NITRITE, URINE: NEGATIVE
O2 SAT, VEN: 62 %
O2 SAT, VEN: 72 %
O2 SAT, VEN: 78 %
OSMOLALITY URINE: 458 MOSM/KG (ref 390–1070)
OSMOLALITY: 305 MOSM/KG (ref 280–301)
PCO2, VEN: 40.1 MM HG (ref 40–50)
PCO2, VEN: 45.1 MM HG (ref 40–50)
PCO2, VEN: 45.8 MM HG (ref 40–50)
PDW BLD-RTO: 15 % (ref 12.4–15.4)
PDW BLD-RTO: 15 % (ref 12.4–15.4)
PDW BLD-RTO: 15.1 % (ref 12.4–15.4)
PDW BLD-RTO: 15.1 % (ref 12.4–15.4)
PDW BLD-RTO: 15.2 % (ref 12.4–15.4)
PDW BLD-RTO: 15.3 % (ref 12.4–15.4)
PDW BLD-RTO: 15.4 % (ref 12.4–15.4)
PDW BLD-RTO: 15.6 % (ref 12.4–15.4)
PERFORMED ON: ABNORMAL
PERFORMED ON: NORMAL
PH UA: 5.5 (ref 5–8)
PH VENOUS: 7.33 (ref 7.35–7.45)
PH VENOUS: 7.36 (ref 7.35–7.45)
PH VENOUS: 7.37 (ref 7.35–7.45)
PHOSPHORUS: 2.2 MG/DL (ref 2.5–4.9)
PHOSPHORUS: 2.5 MG/DL (ref 2.5–4.9)
PHOSPHORUS: 2.5 MG/DL (ref 2.5–4.9)
PHOSPHORUS: 2.7 MG/DL (ref 2.5–4.9)
PHOSPHORUS: 3.2 MG/DL (ref 2.5–4.9)
PHOSPHORUS: 3.8 MG/DL (ref 2.5–4.9)
PHOSPHORUS: 4.1 MG/DL (ref 2.5–4.9)
PLATELET # BLD: 112 K/UL (ref 135–450)
PLATELET # BLD: 114 K/UL (ref 135–450)
PLATELET # BLD: 115 K/UL (ref 135–450)
PLATELET # BLD: 122 K/UL (ref 135–450)
PLATELET # BLD: 123 K/UL (ref 135–450)
PLATELET # BLD: 126 K/UL (ref 135–450)
PLATELET # BLD: 127 K/UL (ref 135–450)
PLATELET # BLD: 132 K/UL (ref 135–450)
PLATELET # BLD: 139 K/UL (ref 135–450)
PLATELET # BLD: 140 K/UL (ref 135–450)
PLATELET # BLD: 141 K/UL (ref 135–450)
PLATELET # BLD: 149 K/UL (ref 135–450)
PLATELET # BLD: 152 K/UL (ref 135–450)
PLATELET # BLD: 162 K/UL (ref 135–450)
PMV BLD AUTO: 10.1 FL (ref 5–10.5)
PMV BLD AUTO: 10.2 FL (ref 5–10.5)
PMV BLD AUTO: 10.2 FL (ref 5–10.5)
PMV BLD AUTO: 10.3 FL (ref 5–10.5)
PMV BLD AUTO: 10.4 FL (ref 5–10.5)
PMV BLD AUTO: 10.4 FL (ref 5–10.5)
PMV BLD AUTO: 10.6 FL (ref 5–10.5)
PMV BLD AUTO: 9.1 FL (ref 5–10.5)
PMV BLD AUTO: 9.4 FL (ref 5–10.5)
PMV BLD AUTO: 9.6 FL (ref 5–10.5)
PMV BLD AUTO: 9.6 FL (ref 5–10.5)
PMV BLD AUTO: 9.7 FL (ref 5–10.5)
PO2, VEN: 34 MM HG
PO2, VEN: 39 MM HG
PO2, VEN: 46 MM HG
POC ACT LR: 155 SEC
POC ACT LR: 170 SEC
POC ACT LR: 231 SEC
POC ACT LR: 248 SEC
POC ACT LR: 262 SEC
POC ACT LR: 297 SEC
POC ACT LR: 307 SEC
POC ACT LR: 329 SEC
POC ACT LR: >400 SEC
POC SAMPLE TYPE: ABNORMAL
POC SAMPLE TYPE: NORMAL
POC SAMPLE TYPE: NORMAL
POTASSIUM REFLEX MAGNESIUM: 4.2 MMOL/L (ref 3.5–5.1)
POTASSIUM REFLEX MAGNESIUM: 5 MMOL/L (ref 3.5–5.1)
POTASSIUM REFLEX MAGNESIUM: 5 MMOL/L (ref 3.5–5.1)
POTASSIUM REFLEX MAGNESIUM: 5.2 MMOL/L (ref 3.5–5.1)
POTASSIUM REFLEX MAGNESIUM: 6.5 MMOL/L (ref 3.5–5.1)
POTASSIUM SERPL-SCNC: 3.9 MMOL/L (ref 3.5–5.1)
POTASSIUM SERPL-SCNC: 4 MMOL/L (ref 3.5–5.1)
POTASSIUM SERPL-SCNC: 4 MMOL/L (ref 3.5–5.1)
POTASSIUM SERPL-SCNC: 4.1 MMOL/L (ref 3.5–5.1)
POTASSIUM SERPL-SCNC: 4.1 MMOL/L (ref 3.5–5.1)
POTASSIUM SERPL-SCNC: 4.2 MMOL/L (ref 3.5–5.1)
POTASSIUM SERPL-SCNC: 4.2 MMOL/L (ref 3.5–5.1)
POTASSIUM SERPL-SCNC: 4.5 MMOL/L (ref 3.5–5.1)
POTASSIUM SERPL-SCNC: 4.6 MMOL/L (ref 3.5–5.1)
POTASSIUM SERPL-SCNC: 4.6 MMOL/L (ref 3.5–5.1)
POTASSIUM SERPL-SCNC: 5 MMOL/L (ref 3.5–5.1)
POTASSIUM SERPL-SCNC: 5.1 MMOL/L (ref 3.5–5.1)
POTASSIUM SERPL-SCNC: 5.8 MMOL/L (ref 3.5–5.1)
POTASSIUM SERPL-SCNC: 6.5 MMOL/L (ref 3.5–5.1)
POTASSIUM, UR: 22.3 MMOL/L
PRO-BNP: 965 PG/ML (ref 0–449)
PROTEIN PROTEIN: 5 MG/DL
PROTEIN UA: NEGATIVE MG/DL
PROTEIN/CREAT RATIO: 0.1 MG/DL
PROTHROMBIN TIME: 13.2 SEC (ref 10–13.2)
PROTHROMBIN TIME: 14 SEC (ref 10–13.2)
PROTHROMBIN TIME: 14.3 SEC (ref 10–13.2)
PROTHROMBIN TIME: 14.7 SEC (ref 10–13.2)
PROTHROMBIN TIME: 15 SEC (ref 10–13.2)
PROTHROMBIN TIME: 16.9 SEC (ref 10–13.2)
PROTHROMBIN TIME: 18.7 SEC (ref 10–13.2)
PROTHROMBIN TIME: 34.6 SEC (ref 10–13.2)
PROTHROMBIN TIME: 42.7 SEC (ref 10–13.2)
PROTHROMBIN TIME: 42.9 SEC (ref 10–13.2)
PROTHROMBIN TIME: 47.3 SEC (ref 10–13.2)
PROTHROMBIN TIME: 52.4 SEC (ref 10–13.2)
RBC # BLD: 3.12 M/UL (ref 4.2–5.9)
RBC # BLD: 3.13 M/UL (ref 4.2–5.9)
RBC # BLD: 3.26 M/UL (ref 4.2–5.9)
RBC # BLD: 3.49 M/UL (ref 4.2–5.9)
RBC # BLD: 3.68 M/UL (ref 4.2–5.9)
RBC # BLD: 3.79 M/UL (ref 4.2–5.9)
RBC # BLD: 3.85 M/UL (ref 4.2–5.9)
RBC # BLD: 3.86 M/UL (ref 4.2–5.9)
RBC # BLD: 3.87 M/UL (ref 4.2–5.9)
RBC # BLD: 4.02 M/UL (ref 4.2–5.9)
RBC # BLD: 4.23 M/UL (ref 4.2–5.9)
RBC # BLD: 4.27 M/UL (ref 4.2–5.9)
RBC # BLD: 4.46 M/UL (ref 4.2–5.9)
RBC # BLD: 4.56 M/UL (ref 4.2–5.9)
RBC UA: ABNORMAL /HPF (ref 0–2)
SODIUM BLD-SCNC: 134 MMOL/L (ref 136–145)
SODIUM BLD-SCNC: 135 MMOL/L (ref 136–145)
SODIUM BLD-SCNC: 135 MMOL/L (ref 136–145)
SODIUM BLD-SCNC: 136 MMOL/L (ref 136–145)
SODIUM BLD-SCNC: 137 MMOL/L (ref 136–145)
SODIUM BLD-SCNC: 139 MMOL/L (ref 136–145)
SODIUM BLD-SCNC: 140 MMOL/L (ref 136–145)
SODIUM BLD-SCNC: 141 MMOL/L (ref 136–145)
SODIUM BLD-SCNC: 142 MMOL/L (ref 136–145)
SODIUM BLD-SCNC: 142 MMOL/L (ref 136–145)
SODIUM URINE: 145 MMOL/L
SPECIFIC GRAVITY UA: 1.01 (ref 1–1.03)
TCO2 CALC VENOUS: 24 MMOL/L
TCO2 CALC VENOUS: 26 MMOL/L
TCO2 CALC VENOUS: 27 MMOL/L
TOTAL CK: 94 U/L (ref 39–308)
TOTAL PROTEIN: 7.6 G/DL (ref 6.4–8.2)
TOTAL PROTEIN: 7.6 G/DL (ref 6.4–8.2)
TOTAL PROTEIN: 8.1 G/DL (ref 6.4–8.2)
TOTAL PROTEIN: 8.1 G/DL (ref 6.4–8.2)
TRIGL SERPL-MCNC: 93 MG/DL (ref 0–150)
TROPONIN: 0.03 NG/ML
TROPONIN: 0.12 NG/ML
TROPONIN: 0.77 NG/ML
URINE TYPE: ABNORMAL
UROBILINOGEN, URINE: 0.2 E.U./DL
VLDLC SERPL CALC-MCNC: 19 MG/DL
WBC # BLD: 5.3 K/UL (ref 4–11)
WBC # BLD: 5.5 K/UL (ref 4–11)
WBC # BLD: 5.5 K/UL (ref 4–11)
WBC # BLD: 5.6 K/UL (ref 4–11)
WBC # BLD: 5.6 K/UL (ref 4–11)
WBC # BLD: 5.7 K/UL (ref 4–11)
WBC # BLD: 5.9 K/UL (ref 4–11)
WBC # BLD: 6 K/UL (ref 4–11)
WBC # BLD: 6.2 K/UL (ref 4–11)
WBC # BLD: 7.6 K/UL (ref 4–11)
WBC # BLD: 7.7 K/UL (ref 4–11)
WBC # BLD: 8.8 K/UL (ref 4–11)
WBC UA: ABNORMAL /HPF (ref 0–5)

## 2019-01-01 PROCEDURE — 4A033BC MEASUREMENT OF ARTERIAL PRESSURE, CORONARY, PERCUTANEOUS APPROACH: ICD-10-PCS | Performed by: INTERNAL MEDICINE

## 2019-01-01 PROCEDURE — 6370000000 HC RX 637 (ALT 250 FOR IP): Performed by: INTERNAL MEDICINE

## 2019-01-01 PROCEDURE — 6360000002 HC RX W HCPCS: Performed by: ANESTHESIOLOGY

## 2019-01-01 PROCEDURE — C1887 CATHETER, GUIDING: HCPCS

## 2019-01-01 PROCEDURE — 99153 MOD SED SAME PHYS/QHP EA: CPT

## 2019-01-01 PROCEDURE — 36415 COLL VENOUS BLD VENIPUNCTURE: CPT

## 2019-01-01 PROCEDURE — 85027 COMPLETE CBC AUTOMATED: CPT

## 2019-01-01 PROCEDURE — 80048 BASIC METABOLIC PNL TOTAL CA: CPT

## 2019-01-01 PROCEDURE — 2580000003 HC RX 258

## 2019-01-01 PROCEDURE — 80053 COMPREHEN METABOLIC PANEL: CPT

## 2019-01-01 PROCEDURE — 99232 SBSQ HOSP IP/OBS MODERATE 35: CPT | Performed by: NURSE PRACTITIONER

## 2019-01-01 PROCEDURE — 2060000000 HC ICU INTERMEDIATE R&B

## 2019-01-01 PROCEDURE — 6370000000 HC RX 637 (ALT 250 FOR IP): Performed by: PHYSICIAN ASSISTANT

## 2019-01-01 PROCEDURE — 93460 R&L HRT ART/VENTRICLE ANGIO: CPT

## 2019-01-01 PROCEDURE — 92978 ENDOLUMINL IVUS OCT C 1ST: CPT | Performed by: INTERNAL MEDICINE

## 2019-01-01 PROCEDURE — 80069 RENAL FUNCTION PANEL: CPT

## 2019-01-01 PROCEDURE — 2580000003 HC RX 258: Performed by: PHYSICIAN ASSISTANT

## 2019-01-01 PROCEDURE — 96374 THER/PROPH/DIAG INJ IV PUSH: CPT

## 2019-01-01 PROCEDURE — 2500000003 HC RX 250 WO HCPCS

## 2019-01-01 PROCEDURE — 33208 INSRT HEART PM ATRIAL & VENT: CPT

## 2019-01-01 PROCEDURE — 93010 ELECTROCARDIOGRAM REPORT: CPT | Performed by: INTERNAL MEDICINE

## 2019-01-01 PROCEDURE — 6370000000 HC RX 637 (ALT 250 FOR IP): Performed by: EMERGENCY MEDICINE

## 2019-01-01 PROCEDURE — 2000000000 HC ICU R&B

## 2019-01-01 PROCEDURE — 2500000003 HC RX 250 WO HCPCS: Performed by: INTERNAL MEDICINE

## 2019-01-01 PROCEDURE — C1725 CATH, TRANSLUMIN NON-LASER: HCPCS

## 2019-01-01 PROCEDURE — 99211 OFF/OP EST MAY X REQ PHY/QHP: CPT

## 2019-01-01 PROCEDURE — 85610 PROTHROMBIN TIME: CPT

## 2019-01-01 PROCEDURE — 51798 US URINE CAPACITY MEASURE: CPT

## 2019-01-01 PROCEDURE — 6370000000 HC RX 637 (ALT 250 FOR IP): Performed by: NURSE PRACTITIONER

## 2019-01-01 PROCEDURE — 02C03ZZ EXTIRPATION OF MATTER FROM CORONARY ARTERY, ONE ARTERY, PERCUTANEOUS APPROACH: ICD-10-PCS | Performed by: INTERNAL MEDICINE

## 2019-01-01 PROCEDURE — 6360000004 HC RX CONTRAST MEDICATION

## 2019-01-01 PROCEDURE — 85025 COMPLETE CBC W/AUTO DIFF WBC: CPT

## 2019-01-01 PROCEDURE — G0378 HOSPITAL OBSERVATION PER HR: HCPCS

## 2019-01-01 PROCEDURE — 99217 PR OBSERVATION CARE DISCHARGE MANAGEMENT: CPT | Performed by: NURSE PRACTITIONER

## 2019-01-01 PROCEDURE — 97530 THERAPEUTIC ACTIVITIES: CPT

## 2019-01-01 PROCEDURE — 29581 APPL MULTLAYER CMPRN SYS LEG: CPT

## 2019-01-01 PROCEDURE — 2580000003 HC RX 258: Performed by: INTERNAL MEDICINE

## 2019-01-01 PROCEDURE — 2580000003 HC RX 258: Performed by: ANESTHESIOLOGY

## 2019-01-01 PROCEDURE — C9607 PERC D-E COR REVASC CHRO SIN: HCPCS

## 2019-01-01 PROCEDURE — 2700000000 HC OXYGEN THERAPY PER DAY

## 2019-01-01 PROCEDURE — 6360000002 HC RX W HCPCS

## 2019-01-01 PROCEDURE — 76770 US EXAM ABDO BACK WALL COMP: CPT

## 2019-01-01 PROCEDURE — 83935 ASSAY OF URINE OSMOLALITY: CPT

## 2019-01-01 PROCEDURE — 92979 ENDOLUMINL IVUS OCT C EA: CPT | Performed by: INTERNAL MEDICINE

## 2019-01-01 PROCEDURE — 92979 ENDOLUMINL IVUS OCT C EA: CPT

## 2019-01-01 PROCEDURE — C1753 CATH, INTRAVAS ULTRASOUND: HCPCS

## 2019-01-01 PROCEDURE — 97161 PT EVAL LOW COMPLEX 20 MIN: CPT

## 2019-01-01 PROCEDURE — 99024 POSTOP FOLLOW-UP VISIT: CPT | Performed by: INTERNAL MEDICINE

## 2019-01-01 PROCEDURE — C1894 INTRO/SHEATH, NON-LASER: HCPCS

## 2019-01-01 PROCEDURE — 93005 ELECTROCARDIOGRAM TRACING: CPT | Performed by: INTERNAL MEDICINE

## 2019-01-01 PROCEDURE — 97116 GAIT TRAINING THERAPY: CPT

## 2019-01-01 PROCEDURE — 6360000002 HC RX W HCPCS: Performed by: PHYSICIAN ASSISTANT

## 2019-01-01 PROCEDURE — 96375 TX/PRO/DX INJ NEW DRUG ADDON: CPT

## 2019-01-01 PROCEDURE — 4A023N8 MEASUREMENT OF CARDIAC SAMPLING AND PRESSURE, BILATERAL, PERCUTANEOUS APPROACH: ICD-10-PCS | Performed by: INTERNAL MEDICINE

## 2019-01-01 PROCEDURE — 85347 COAGULATION TIME ACTIVATED: CPT

## 2019-01-01 PROCEDURE — 6360000002 HC RX W HCPCS: Performed by: INTERNAL MEDICINE

## 2019-01-01 PROCEDURE — 71045 X-RAY EXAM CHEST 1 VIEW: CPT

## 2019-01-01 PROCEDURE — 82570 ASSAY OF URINE CREATININE: CPT

## 2019-01-01 PROCEDURE — 99232 SBSQ HOSP IP/OBS MODERATE 35: CPT | Performed by: INTERNAL MEDICINE

## 2019-01-01 PROCEDURE — 0HDKXZZ EXTRACTION OF RIGHT LOWER LEG SKIN, EXTERNAL APPROACH: ICD-10-PCS | Performed by: NURSE PRACTITIONER

## 2019-01-01 PROCEDURE — 6370000000 HC RX 637 (ALT 250 FOR IP)

## 2019-01-01 PROCEDURE — 84484 ASSAY OF TROPONIN QUANT: CPT

## 2019-01-01 PROCEDURE — C1785 PMKR, DUAL, RATE-RESP: HCPCS

## 2019-01-01 PROCEDURE — 82803 BLOOD GASES ANY COMBINATION: CPT

## 2019-01-01 PROCEDURE — C8923 2D TTE W OR W/O FOL W/CON,CO: HCPCS

## 2019-01-01 PROCEDURE — 99152 MOD SED SAME PHYS/QHP 5/>YRS: CPT

## 2019-01-01 PROCEDURE — 82550 ASSAY OF CK (CPK): CPT

## 2019-01-01 PROCEDURE — 96365 THER/PROPH/DIAG IV INF INIT: CPT

## 2019-01-01 PROCEDURE — 83930 ASSAY OF BLOOD OSMOLALITY: CPT

## 2019-01-01 PROCEDURE — C1769 GUIDE WIRE: HCPCS

## 2019-01-01 PROCEDURE — 1200000000 HC SEMI PRIVATE

## 2019-01-01 PROCEDURE — 93005 ELECTROCARDIOGRAM TRACING: CPT

## 2019-01-01 PROCEDURE — 81001 URINALYSIS AUTO W/SCOPE: CPT

## 2019-01-01 PROCEDURE — 2500000003 HC RX 250 WO HCPCS: Performed by: ANESTHESIOLOGY

## 2019-01-01 PROCEDURE — C1898 LEAD, PMKR, OTHER THAN TRANS: HCPCS

## 2019-01-01 PROCEDURE — 6360000002 HC RX W HCPCS: Performed by: EMERGENCY MEDICINE

## 2019-01-01 PROCEDURE — 80061 LIPID PANEL: CPT

## 2019-01-01 PROCEDURE — 97110 THERAPEUTIC EXERCISES: CPT

## 2019-01-01 PROCEDURE — 2W1QX6Z COMPRESSION OF RIGHT LOWER LEG USING PRESSURE DRESSING: ICD-10-PCS | Performed by: NURSE PRACTITIONER

## 2019-01-01 PROCEDURE — 84300 ASSAY OF URINE SODIUM: CPT

## 2019-01-01 PROCEDURE — B2111ZZ FLUOROSCOPY OF MULTIPLE CORONARY ARTERIES USING LOW OSMOLAR CONTRAST: ICD-10-PCS | Performed by: INTERNAL MEDICINE

## 2019-01-01 PROCEDURE — 93005 ELECTROCARDIOGRAM TRACING: CPT | Performed by: NURSE PRACTITIONER

## 2019-01-01 PROCEDURE — 3700000001 HC ADD 15 MINUTES (ANESTHESIA)

## 2019-01-01 PROCEDURE — 93571 IV DOP VEL&/PRESS C FLO 1ST: CPT | Performed by: INTERNAL MEDICINE

## 2019-01-01 PROCEDURE — 7100000000 HC PACU RECOVERY - FIRST 15 MIN

## 2019-01-01 PROCEDURE — 84133 ASSAY OF URINE POTASSIUM: CPT

## 2019-01-01 PROCEDURE — C1724 CATH, TRANS ATHEREC,ROTATION: HCPCS

## 2019-01-01 PROCEDURE — 83735 ASSAY OF MAGNESIUM: CPT

## 2019-01-01 PROCEDURE — 92978 ENDOLUMINL IVUS OCT C 1ST: CPT

## 2019-01-01 PROCEDURE — 2709999900 HC NON-CHARGEABLE SUPPLY

## 2019-01-01 PROCEDURE — 7100000001 HC PACU RECOVERY - ADDTL 15 MIN

## 2019-01-01 PROCEDURE — 82436 ASSAY OF URINE CHLORIDE: CPT

## 2019-01-01 PROCEDURE — 29581 APPL MULTLAYER CMPRN SYS LEG: CPT | Performed by: NURSE PRACTITIONER

## 2019-01-01 PROCEDURE — 33208 INSRT HEART PM ATRIAL & VENT: CPT | Performed by: INTERNAL MEDICINE

## 2019-01-01 PROCEDURE — 99233 SBSQ HOSP IP/OBS HIGH 50: CPT | Performed by: INTERNAL MEDICINE

## 2019-01-01 PROCEDURE — 93005 ELECTROCARDIOGRAM TRACING: CPT | Performed by: EMERGENCY MEDICINE

## 2019-01-01 PROCEDURE — 2580000003 HC RX 258: Performed by: EMERGENCY MEDICINE

## 2019-01-01 PROCEDURE — 93460 R&L HRT ART/VENTRICLE ANGIO: CPT | Performed by: INTERNAL MEDICINE

## 2019-01-01 PROCEDURE — 93571 IV DOP VEL&/PRESS C FLO 1ST: CPT

## 2019-01-01 PROCEDURE — 3700000000 HC ANESTHESIA ATTENDED CARE

## 2019-01-01 PROCEDURE — 99283 EMERGENCY DEPT VISIT LOW MDM: CPT

## 2019-01-01 PROCEDURE — 99233 SBSQ HOSP IP/OBS HIGH 50: CPT | Performed by: NURSE PRACTITIONER

## 2019-01-01 PROCEDURE — 92933 PRQ TRLML C ATHRC ST ANGIOP1: CPT | Performed by: INTERNAL MEDICINE

## 2019-01-01 PROCEDURE — 94761 N-INVAS EAR/PLS OXIMETRY MLT: CPT

## 2019-01-01 PROCEDURE — 027037Z DILATION OF CORONARY ARTERY, ONE ARTERY WITH FOUR OR MORE DRUG-ELUTING INTRALUMINAL DEVICES, PERCUTANEOUS APPROACH: ICD-10-PCS | Performed by: INTERNAL MEDICINE

## 2019-01-01 PROCEDURE — C1760 CLOSURE DEV, VASC: HCPCS

## 2019-01-01 PROCEDURE — 99223 1ST HOSP IP/OBS HIGH 75: CPT | Performed by: INTERNAL MEDICINE

## 2019-01-01 PROCEDURE — 99284 EMERGENCY DEPT VISIT MOD MDM: CPT

## 2019-01-01 PROCEDURE — 97165 OT EVAL LOW COMPLEX 30 MIN: CPT

## 2019-01-01 PROCEDURE — 2720000010 HC SURG SUPPLY STERILE

## 2019-01-01 PROCEDURE — 83880 ASSAY OF NATRIURETIC PEPTIDE: CPT

## 2019-01-01 PROCEDURE — 71046 X-RAY EXAM CHEST 2 VIEWS: CPT

## 2019-01-01 PROCEDURE — 99213 OFFICE O/P EST LOW 20 MIN: CPT

## 2019-01-01 PROCEDURE — B2151ZZ FLUOROSCOPY OF LEFT HEART USING LOW OSMOLAR CONTRAST: ICD-10-PCS | Performed by: INTERNAL MEDICINE

## 2019-01-01 PROCEDURE — 84156 ASSAY OF PROTEIN URINE: CPT

## 2019-01-01 PROCEDURE — C1874 STENT, COATED/COV W/DEL SYS: HCPCS

## 2019-01-01 RX ORDER — CEPHALEXIN 250 MG/1
500 CAPSULE ORAL EVERY 12 HOURS SCHEDULED
Status: DISCONTINUED | OUTPATIENT
Start: 2019-01-01 | End: 2019-01-01 | Stop reason: HOSPADM

## 2019-01-01 RX ORDER — MIDAZOLAM HYDROCHLORIDE 5 MG/ML
INJECTION INTRAMUSCULAR; INTRAVENOUS
Status: COMPLETED | OUTPATIENT
Start: 2019-01-01 | End: 2019-01-01

## 2019-01-01 RX ORDER — SODIUM CHLORIDE 0.9 % (FLUSH) 0.9 %
10 SYRINGE (ML) INJECTION PRN
Status: DISCONTINUED | OUTPATIENT
Start: 2019-01-01 | End: 2019-01-01 | Stop reason: SDUPTHER

## 2019-01-01 RX ORDER — FENTANYL CITRATE 50 UG/ML
INJECTION, SOLUTION INTRAMUSCULAR; INTRAVENOUS
Status: COMPLETED | OUTPATIENT
Start: 2019-01-01 | End: 2019-01-01

## 2019-01-01 RX ORDER — ACETAMINOPHEN 325 MG/1
650 TABLET ORAL EVERY 4 HOURS PRN
Status: DISCONTINUED | OUTPATIENT
Start: 2019-01-01 | End: 2019-01-01 | Stop reason: SDUPTHER

## 2019-01-01 RX ORDER — LIDOCAINE 40 MG/G
CREAM TOPICAL ONCE
Status: DISCONTINUED | OUTPATIENT
Start: 2019-01-01 | End: 2019-01-01 | Stop reason: HOSPADM

## 2019-01-01 RX ORDER — HYDRALAZINE HYDROCHLORIDE 50 MG/1
50 TABLET, FILM COATED ORAL EVERY 8 HOURS SCHEDULED
Status: DISCONTINUED | OUTPATIENT
Start: 2019-01-01 | End: 2019-01-01 | Stop reason: HOSPADM

## 2019-01-01 RX ORDER — MORPHINE SULFATE 4 MG/ML
1 INJECTION, SOLUTION INTRAMUSCULAR; INTRAVENOUS EVERY 5 MIN PRN
Status: DISCONTINUED | OUTPATIENT
Start: 2019-01-01 | End: 2019-01-01 | Stop reason: HOSPADM

## 2019-01-01 RX ORDER — ONDANSETRON 2 MG/ML
4 INJECTION INTRAMUSCULAR; INTRAVENOUS EVERY 6 HOURS PRN
Status: DISCONTINUED | OUTPATIENT
Start: 2019-01-01 | End: 2019-01-01 | Stop reason: HOSPADM

## 2019-01-01 RX ORDER — FUROSEMIDE 40 MG/1
40 TABLET ORAL DAILY
Status: DISCONTINUED | OUTPATIENT
Start: 2019-01-01 | End: 2019-01-01

## 2019-01-01 RX ORDER — ATORVASTATIN CALCIUM 80 MG/1
80 TABLET, FILM COATED ORAL NIGHTLY
Qty: 30 TABLET | Refills: 1 | Status: ON HOLD | OUTPATIENT
Start: 2019-01-01 | End: 2020-01-01 | Stop reason: DRUGHIGH

## 2019-01-01 RX ORDER — SODIUM CHLORIDE 9 MG/ML
INJECTION, SOLUTION INTRAVENOUS CONTINUOUS PRN
Status: DISCONTINUED | OUTPATIENT
Start: 2019-01-01 | End: 2019-01-01 | Stop reason: SDUPTHER

## 2019-01-01 RX ORDER — MEPERIDINE HYDROCHLORIDE 50 MG/ML
12.5 INJECTION INTRAMUSCULAR; INTRAVENOUS; SUBCUTANEOUS EVERY 5 MIN PRN
Status: DISCONTINUED | OUTPATIENT
Start: 2019-01-01 | End: 2019-01-01 | Stop reason: HOSPADM

## 2019-01-01 RX ORDER — HYDRALAZINE HYDROCHLORIDE 20 MG/ML
10 INJECTION INTRAMUSCULAR; INTRAVENOUS EVERY 6 HOURS PRN
Status: DISCONTINUED | OUTPATIENT
Start: 2019-01-01 | End: 2019-01-01 | Stop reason: HOSPADM

## 2019-01-01 RX ORDER — SODIUM CHLORIDE 0.9 % (FLUSH) 0.9 %
10 SYRINGE (ML) INJECTION EVERY 12 HOURS SCHEDULED
Status: DISCONTINUED | OUTPATIENT
Start: 2019-01-01 | End: 2019-01-01 | Stop reason: HOSPADM

## 2019-01-01 RX ORDER — SODIUM CHLORIDE 9 MG/ML
INJECTION, SOLUTION INTRAVENOUS CONTINUOUS
Status: DISCONTINUED | OUTPATIENT
Start: 2019-01-01 | End: 2019-01-01 | Stop reason: HOSPADM

## 2019-01-01 RX ORDER — SODIUM BICARBONATE 650 MG/1
650 TABLET ORAL 3 TIMES DAILY
Status: DISCONTINUED | OUTPATIENT
Start: 2019-01-01 | End: 2019-01-01 | Stop reason: HOSPADM

## 2019-01-01 RX ORDER — AMLODIPINE BESYLATE 5 MG/1
5 TABLET ORAL 2 TIMES DAILY
Status: DISCONTINUED | OUTPATIENT
Start: 2019-01-01 | End: 2019-01-01

## 2019-01-01 RX ORDER — HYDRALAZINE HYDROCHLORIDE 20 MG/ML
5 INJECTION INTRAMUSCULAR; INTRAVENOUS EVERY 10 MIN PRN
Status: DISCONTINUED | OUTPATIENT
Start: 2019-01-01 | End: 2019-01-01 | Stop reason: HOSPADM

## 2019-01-01 RX ORDER — HYDRALAZINE HYDROCHLORIDE 50 MG/1
50 TABLET, FILM COATED ORAL EVERY 8 HOURS SCHEDULED
Qty: 90 TABLET | Refills: 1 | Status: SHIPPED | OUTPATIENT
Start: 2019-01-01

## 2019-01-01 RX ORDER — ASPIRIN 81 MG/1
81 TABLET, CHEWABLE ORAL DAILY
Qty: 30 TABLET | Refills: 3 | Status: SHIPPED | OUTPATIENT
Start: 2019-01-01 | End: 2020-01-01

## 2019-01-01 RX ORDER — SODIUM CHLORIDE 0.9 % (FLUSH) 0.9 %
10 SYRINGE (ML) INJECTION EVERY 12 HOURS SCHEDULED
Status: DISCONTINUED | OUTPATIENT
Start: 2019-01-01 | End: 2019-01-01 | Stop reason: SDUPTHER

## 2019-01-01 RX ORDER — SODIUM CHLORIDE 9 MG/ML
INJECTION, SOLUTION INTRAVENOUS
Status: DISPENSED
Start: 2019-01-01 | End: 2019-01-01

## 2019-01-01 RX ORDER — CEFUROXIME AXETIL 250 MG/1
250 TABLET ORAL 2 TIMES DAILY
COMMUNITY
Start: 2019-01-01 | End: 2019-01-01

## 2019-01-01 RX ORDER — ONDANSETRON 2 MG/ML
4 INJECTION INTRAMUSCULAR; INTRAVENOUS
Status: ACTIVE | OUTPATIENT
Start: 2019-01-01 | End: 2019-01-01

## 2019-01-01 RX ORDER — ROCURONIUM BROMIDE 10 MG/ML
INJECTION, SOLUTION INTRAVENOUS PRN
Status: DISCONTINUED | OUTPATIENT
Start: 2019-01-01 | End: 2019-01-01 | Stop reason: SDUPTHER

## 2019-01-01 RX ORDER — MORPHINE SULFATE 4 MG/ML
2 INJECTION, SOLUTION INTRAMUSCULAR; INTRAVENOUS
Status: DISCONTINUED | OUTPATIENT
Start: 2019-01-01 | End: 2019-01-01 | Stop reason: HOSPADM

## 2019-01-01 RX ORDER — SODIUM CHLORIDE 9 MG/ML
INJECTION, SOLUTION INTRAVENOUS CONTINUOUS
Status: DISCONTINUED | OUTPATIENT
Start: 2019-01-01 | End: 2019-01-01

## 2019-01-01 RX ORDER — WARFARIN SODIUM 2 MG/1
2 TABLET ORAL
Status: DISCONTINUED | OUTPATIENT
Start: 2019-01-01 | End: 2019-01-01

## 2019-01-01 RX ORDER — MORPHINE SULFATE 2 MG/ML
2 INJECTION, SOLUTION INTRAMUSCULAR; INTRAVENOUS ONCE
Status: COMPLETED | OUTPATIENT
Start: 2019-01-01 | End: 2019-01-01

## 2019-01-01 RX ORDER — PROMETHAZINE HYDROCHLORIDE 25 MG/ML
6.25 INJECTION, SOLUTION INTRAMUSCULAR; INTRAVENOUS
Status: ACTIVE | OUTPATIENT
Start: 2019-01-01 | End: 2019-01-01

## 2019-01-01 RX ORDER — SODIUM CHLORIDE 0.9 % (FLUSH) 0.9 %
10 SYRINGE (ML) INJECTION PRN
Status: DISCONTINUED | OUTPATIENT
Start: 2019-01-01 | End: 2019-01-01 | Stop reason: HOSPADM

## 2019-01-01 RX ORDER — AMLODIPINE BESYLATE 5 MG/1
10 TABLET ORAL DAILY
Status: DISCONTINUED | OUTPATIENT
Start: 2019-01-01 | End: 2019-01-01

## 2019-01-01 RX ORDER — ALBUTEROL SULFATE 2.5 MG/3ML
5 SOLUTION RESPIRATORY (INHALATION) ONCE
Status: COMPLETED | OUTPATIENT
Start: 2019-01-01 | End: 2019-01-01

## 2019-01-01 RX ORDER — SIMVASTATIN 10 MG
20 TABLET ORAL NIGHTLY
Status: DISCONTINUED | OUTPATIENT
Start: 2019-01-01 | End: 2019-01-01

## 2019-01-01 RX ORDER — FUROSEMIDE 10 MG/ML
40 INJECTION INTRAMUSCULAR; INTRAVENOUS ONCE
Status: COMPLETED | OUTPATIENT
Start: 2019-01-01 | End: 2019-01-01

## 2019-01-01 RX ORDER — ATORVASTATIN CALCIUM 80 MG/1
80 TABLET, FILM COATED ORAL NIGHTLY
Status: DISCONTINUED | OUTPATIENT
Start: 2019-01-01 | End: 2019-01-01 | Stop reason: HOSPADM

## 2019-01-01 RX ORDER — HYDRALAZINE HYDROCHLORIDE 25 MG/1
TABLET, FILM COATED ORAL
Status: DISPENSED
Start: 2019-01-01 | End: 2019-01-01

## 2019-01-01 RX ORDER — AMLODIPINE BESYLATE 5 MG/1
5 TABLET ORAL DAILY
Status: DISCONTINUED | OUTPATIENT
Start: 2019-01-01 | End: 2019-01-01

## 2019-01-01 RX ORDER — FUROSEMIDE 10 MG/ML
40 INJECTION INTRAMUSCULAR; INTRAVENOUS DAILY
Status: DISCONTINUED | OUTPATIENT
Start: 2019-01-01 | End: 2019-01-01

## 2019-01-01 RX ORDER — MORPHINE SULFATE 2 MG/ML
INJECTION, SOLUTION INTRAMUSCULAR; INTRAVENOUS
Status: DISPENSED
Start: 2019-01-01 | End: 2019-01-01

## 2019-01-01 RX ORDER — ACETAMINOPHEN 325 MG/1
650 TABLET ORAL EVERY 4 HOURS PRN
Status: DISCONTINUED | OUTPATIENT
Start: 2019-01-01 | End: 2019-01-01 | Stop reason: HOSPADM

## 2019-01-01 RX ORDER — ASPIRIN 81 MG/1
81 TABLET, CHEWABLE ORAL DAILY
Status: DISCONTINUED | OUTPATIENT
Start: 2019-01-01 | End: 2019-01-01 | Stop reason: HOSPADM

## 2019-01-01 RX ORDER — LABETALOL HYDROCHLORIDE 5 MG/ML
5 INJECTION, SOLUTION INTRAVENOUS EVERY 10 MIN PRN
Status: DISCONTINUED | OUTPATIENT
Start: 2019-01-01 | End: 2019-01-01 | Stop reason: HOSPADM

## 2019-01-01 RX ORDER — HYDROCODONE BITARTRATE AND ACETAMINOPHEN 7.5; 325 MG/1; MG/1
1 TABLET ORAL EVERY 6 HOURS PRN
Status: DISCONTINUED | OUTPATIENT
Start: 2019-01-01 | End: 2019-01-01 | Stop reason: HOSPADM

## 2019-01-01 RX ORDER — FENTANYL CITRATE 50 UG/ML
50 INJECTION, SOLUTION INTRAMUSCULAR; INTRAVENOUS
Status: DISCONTINUED | OUTPATIENT
Start: 2019-01-01 | End: 2019-01-01 | Stop reason: HOSPADM

## 2019-01-01 RX ORDER — SODIUM CHLORIDE 9 MG/ML
INJECTION, SOLUTION INTRAVENOUS CONTINUOUS
Status: DISPENSED | OUTPATIENT
Start: 2019-01-01 | End: 2019-01-01

## 2019-01-01 RX ORDER — SODIUM POLYSTYRENE SULFONATE 15 G/60ML
30 SUSPENSION ORAL; RECTAL ONCE
Status: COMPLETED | OUTPATIENT
Start: 2019-01-01 | End: 2019-01-01

## 2019-01-01 RX ORDER — ATENOLOL 25 MG/1
25 TABLET ORAL DAILY
Status: DISCONTINUED | OUTPATIENT
Start: 2019-01-01 | End: 2019-01-01

## 2019-01-01 RX ORDER — HYDRALAZINE HYDROCHLORIDE 25 MG/1
25 TABLET, FILM COATED ORAL EVERY 8 HOURS SCHEDULED
Status: DISCONTINUED | OUTPATIENT
Start: 2019-01-01 | End: 2019-01-01

## 2019-01-01 RX ORDER — SODIUM CHLORIDE 9 MG/ML
INJECTION, SOLUTION INTRAVENOUS
Status: COMPLETED
Start: 2019-01-01 | End: 2019-01-01

## 2019-01-01 RX ORDER — ETOMIDATE 2 MG/ML
INJECTION, SOLUTION INTRAVENOUS PRN
Status: DISCONTINUED | OUTPATIENT
Start: 2019-01-01 | End: 2019-01-01 | Stop reason: SDUPTHER

## 2019-01-01 RX ORDER — FUROSEMIDE 20 MG/1
20 TABLET ORAL DAILY
Status: DISCONTINUED | OUTPATIENT
Start: 2019-01-01 | End: 2019-01-01 | Stop reason: HOSPADM

## 2019-01-01 RX ORDER — AMLODIPINE BESYLATE 5 MG/1
5 TABLET ORAL ONCE
Status: COMPLETED | OUTPATIENT
Start: 2019-01-01 | End: 2019-01-01

## 2019-01-01 RX ORDER — DEXTROSE MONOHYDRATE 25 G/50ML
25 INJECTION, SOLUTION INTRAVENOUS ONCE
Status: COMPLETED | OUTPATIENT
Start: 2019-01-01 | End: 2019-01-01

## 2019-01-01 RX ORDER — PHENYLEPHRINE HCL IN 0.9% NACL 1 MG/10 ML
SYRINGE (ML) INTRAVENOUS PRN
Status: DISCONTINUED | OUTPATIENT
Start: 2019-01-01 | End: 2019-01-01 | Stop reason: SDUPTHER

## 2019-01-01 RX ORDER — MORPHINE SULFATE 4 MG/ML
4 INJECTION, SOLUTION INTRAMUSCULAR; INTRAVENOUS
Status: DISCONTINUED | OUTPATIENT
Start: 2019-01-01 | End: 2019-01-01 | Stop reason: HOSPADM

## 2019-01-01 RX ORDER — OXYCODONE HYDROCHLORIDE AND ACETAMINOPHEN 5; 325 MG/1; MG/1
1 TABLET ORAL PRN
Status: ACTIVE | OUTPATIENT
Start: 2019-01-01 | End: 2019-01-01

## 2019-01-01 RX ORDER — OXYCODONE HYDROCHLORIDE 5 MG/1
5 TABLET ORAL EVERY 4 HOURS PRN
Status: DISCONTINUED | OUTPATIENT
Start: 2019-01-01 | End: 2019-01-01 | Stop reason: HOSPADM

## 2019-01-01 RX ORDER — FENTANYL CITRATE 50 UG/ML
INJECTION, SOLUTION INTRAMUSCULAR; INTRAVENOUS PRN
Status: DISCONTINUED | OUTPATIENT
Start: 2019-01-01 | End: 2019-01-01 | Stop reason: SDUPTHER

## 2019-01-01 RX ORDER — WARFARIN SODIUM 2 MG/1
4 TABLET ORAL
Status: DISCONTINUED | OUTPATIENT
Start: 2019-01-01 | End: 2019-01-01

## 2019-01-01 RX ORDER — METOPROLOL SUCCINATE 50 MG/1
50 TABLET, EXTENDED RELEASE ORAL DAILY
Status: DISCONTINUED | OUTPATIENT
Start: 2019-01-01 | End: 2019-01-01 | Stop reason: HOSPADM

## 2019-01-01 RX ORDER — SODIUM BICARBONATE 650 MG/1
650 TABLET ORAL 2 TIMES DAILY
Status: DISCONTINUED | OUTPATIENT
Start: 2019-01-01 | End: 2019-01-01

## 2019-01-01 RX ORDER — SIMVASTATIN 10 MG
20 TABLET ORAL NIGHTLY
Status: DISCONTINUED | OUTPATIENT
Start: 2019-01-01 | End: 2019-01-01 | Stop reason: HOSPADM

## 2019-01-01 RX ORDER — DEXTROSE MONOHYDRATE 25 G/50ML
12.5 INJECTION, SOLUTION INTRAVENOUS PRN
Status: DISCONTINUED | OUTPATIENT
Start: 2019-01-01 | End: 2019-01-01 | Stop reason: HOSPADM

## 2019-01-01 RX ORDER — OXYCODONE HYDROCHLORIDE AND ACETAMINOPHEN 5; 325 MG/1; MG/1
2 TABLET ORAL PRN
Status: ACTIVE | OUTPATIENT
Start: 2019-01-01 | End: 2019-01-01

## 2019-01-01 RX ORDER — AMLODIPINE BESYLATE 5 MG/1
5 TABLET ORAL DAILY
Status: DISCONTINUED | OUTPATIENT
Start: 2019-01-01 | End: 2019-01-01 | Stop reason: HOSPADM

## 2019-01-01 RX ORDER — 0.9 % SODIUM CHLORIDE 0.9 %
INTRAVENOUS SOLUTION INTRAVENOUS CONTINUOUS PRN
Status: COMPLETED | OUTPATIENT
Start: 2019-01-01 | End: 2019-01-01

## 2019-01-01 RX ORDER — MORPHINE SULFATE 4 MG/ML
2 INJECTION, SOLUTION INTRAMUSCULAR; INTRAVENOUS EVERY 5 MIN PRN
Status: DISCONTINUED | OUTPATIENT
Start: 2019-01-01 | End: 2019-01-01 | Stop reason: HOSPADM

## 2019-01-01 RX ORDER — SODIUM BICARBONATE 650 MG/1
TABLET ORAL
Status: DISPENSED
Start: 2019-01-01 | End: 2019-01-01

## 2019-01-01 RX ORDER — OXYCODONE HYDROCHLORIDE 5 MG/1
10 TABLET ORAL EVERY 4 HOURS PRN
Status: DISCONTINUED | OUTPATIENT
Start: 2019-01-01 | End: 2019-01-01 | Stop reason: HOSPADM

## 2019-01-01 RX ORDER — HEPARIN SODIUM 1000 [USP'U]/ML
INJECTION, SOLUTION INTRAVENOUS; SUBCUTANEOUS
Status: COMPLETED | OUTPATIENT
Start: 2019-01-01 | End: 2019-01-01

## 2019-01-01 RX ORDER — DIPHENHYDRAMINE HYDROCHLORIDE 50 MG/ML
12.5 INJECTION INTRAMUSCULAR; INTRAVENOUS
Status: ACTIVE | OUTPATIENT
Start: 2019-01-01 | End: 2019-01-01

## 2019-01-01 RX ORDER — HYDRALAZINE HYDROCHLORIDE 25 MG/1
25 TABLET, FILM COATED ORAL EVERY 8 HOURS SCHEDULED
Status: DISCONTINUED | OUTPATIENT
Start: 2019-01-01 | End: 2019-01-01 | Stop reason: HOSPADM

## 2019-01-01 RX ORDER — SODIUM POLYSTYRENE SULFONATE 15 G/60ML
15 SUSPENSION ORAL; RECTAL ONCE
Status: DISCONTINUED | OUTPATIENT
Start: 2019-01-01 | End: 2019-01-01 | Stop reason: HOSPADM

## 2019-01-01 RX ORDER — NITROGLYCERIN 0.4 MG/1
0.4 TABLET SUBLINGUAL EVERY 5 MIN PRN
Status: DISCONTINUED | OUTPATIENT
Start: 2019-01-01 | End: 2019-01-01 | Stop reason: HOSPADM

## 2019-01-01 RX ORDER — FUROSEMIDE 20 MG/1
20 TABLET ORAL EVERY MORNING
Qty: 60 TABLET | Refills: 0 | Status: ON HOLD | OUTPATIENT
Start: 2019-01-01 | End: 2020-01-01

## 2019-01-01 RX ORDER — SODIUM BICARBONATE 650 MG/1
1300 TABLET ORAL 2 TIMES DAILY
Status: DISCONTINUED | OUTPATIENT
Start: 2019-01-01 | End: 2019-01-01 | Stop reason: HOSPADM

## 2019-01-01 RX ORDER — WARFARIN SODIUM 5 MG/1
5 TABLET ORAL DAILY
Status: DISCONTINUED | OUTPATIENT
Start: 2019-01-01 | End: 2019-01-01 | Stop reason: DRUGHIGH

## 2019-01-01 RX ORDER — FUROSEMIDE 20 MG/1
20 TABLET ORAL EVERY MORNING
Status: DISCONTINUED | OUTPATIENT
Start: 2019-01-01 | End: 2019-01-01 | Stop reason: HOSPADM

## 2019-01-01 RX ORDER — WARFARIN SODIUM 2 MG/1
4 TABLET ORAL
Status: COMPLETED | OUTPATIENT
Start: 2019-01-01 | End: 2019-01-01

## 2019-01-01 RX ORDER — METOPROLOL SUCCINATE 50 MG/1
50 TABLET, EXTENDED RELEASE ORAL DAILY
Qty: 30 TABLET | Refills: 3 | Status: SHIPPED | OUTPATIENT
Start: 2019-01-01 | End: 2020-01-01

## 2019-01-01 RX ORDER — LIDOCAINE HYDROCHLORIDE 20 MG/ML
INJECTION, SOLUTION INFILTRATION; PERINEURAL PRN
Status: DISCONTINUED | OUTPATIENT
Start: 2019-01-01 | End: 2019-01-01 | Stop reason: SDUPTHER

## 2019-01-01 RX ORDER — PHYTONADIONE 5 MG/1
5 TABLET ORAL ONCE
Status: COMPLETED | OUTPATIENT
Start: 2019-01-01 | End: 2019-01-01

## 2019-01-01 RX ORDER — SODIUM BICARBONATE 650 MG/1
650 TABLET ORAL 3 TIMES DAILY
Qty: 90 TABLET | Refills: 1 | Status: SHIPPED | OUTPATIENT
Start: 2019-01-01

## 2019-01-01 RX ORDER — DEXTROSE MONOHYDRATE 50 MG/ML
100 INJECTION, SOLUTION INTRAVENOUS PRN
Status: DISCONTINUED | OUTPATIENT
Start: 2019-01-01 | End: 2019-01-01 | Stop reason: HOSPADM

## 2019-01-01 RX ORDER — CEPHALEXIN 500 MG/1
500 CAPSULE ORAL EVERY 12 HOURS SCHEDULED
Qty: 8 CAPSULE | Refills: 0 | Status: SHIPPED | OUTPATIENT
Start: 2019-01-01 | End: 2020-01-01

## 2019-01-01 RX ORDER — NICOTINE POLACRILEX 4 MG
15 LOZENGE BUCCAL PRN
Status: DISCONTINUED | OUTPATIENT
Start: 2019-01-01 | End: 2019-01-01 | Stop reason: HOSPADM

## 2019-01-01 RX ADMIN — SODIUM POLYSTYRENE SULFONATE 30 G: 15 SUSPENSION ORAL; RECTAL at 18:30

## 2019-01-01 RX ADMIN — HYDRALAZINE HYDROCHLORIDE 50 MG: 50 TABLET, FILM COATED ORAL at 20:28

## 2019-01-01 RX ADMIN — ASPIRIN 81 MG 81 MG: 81 TABLET ORAL at 10:04

## 2019-01-01 RX ADMIN — TICAGRELOR 90 MG: 90 TABLET ORAL at 21:32

## 2019-01-01 RX ADMIN — FENTANYL CITRATE 50 MCG: 50 INJECTION INTRAMUSCULAR; INTRAVENOUS at 13:52

## 2019-01-01 RX ADMIN — FENTANYL CITRATE 25 MCG: 50 INJECTION, SOLUTION INTRAMUSCULAR; INTRAVENOUS at 17:10

## 2019-01-01 RX ADMIN — Medication 10 ML: at 08:45

## 2019-01-01 RX ADMIN — HYDROCODONE BITARTRATE AND ACETAMINOPHEN 1 TABLET: 7.5; 325 TABLET ORAL at 05:19

## 2019-01-01 RX ADMIN — HYDRALAZINE HYDROCHLORIDE 25 MG: 25 TABLET, FILM COATED ORAL at 05:06

## 2019-01-01 RX ADMIN — ASPIRIN 325 MG: 325 TABLET, DELAYED RELEASE ORAL at 12:51

## 2019-01-01 RX ADMIN — FENTANYL CITRATE 50 MCG: 50 INJECTION INTRAMUSCULAR; INTRAVENOUS at 10:37

## 2019-01-01 RX ADMIN — HYDRALAZINE HYDROCHLORIDE 50 MG: 50 TABLET, FILM COATED ORAL at 15:36

## 2019-01-01 RX ADMIN — FENTANYL CITRATE 25 MCG: 50 INJECTION, SOLUTION INTRAMUSCULAR; INTRAVENOUS at 10:07

## 2019-01-01 RX ADMIN — TICAGRELOR 90 MG: 90 TABLET ORAL at 10:03

## 2019-01-01 RX ADMIN — SODIUM BICARBONATE: 84 INJECTION, SOLUTION INTRAVENOUS at 05:35

## 2019-01-01 RX ADMIN — SODIUM CHLORIDE: 9 INJECTION, SOLUTION INTRAVENOUS at 04:45

## 2019-01-01 RX ADMIN — Medication 10 ML: at 18:30

## 2019-01-01 RX ADMIN — AMLODIPINE BESYLATE 10 MG: 5 TABLET ORAL at 08:31

## 2019-01-01 RX ADMIN — Medication 10 ML: at 12:07

## 2019-01-01 RX ADMIN — NITROGLYCERIN 1 INCH: 20 OINTMENT TOPICAL at 12:13

## 2019-01-01 RX ADMIN — MIDAZOLAM HYDROCHLORIDE 1 MG: 5 INJECTION INTRAMUSCULAR; INTRAVENOUS at 10:58

## 2019-01-01 RX ADMIN — HYDRALAZINE HYDROCHLORIDE 50 MG: 50 TABLET, FILM COATED ORAL at 21:41

## 2019-01-01 RX ADMIN — Medication 10 ML: at 22:40

## 2019-01-01 RX ADMIN — AMLODIPINE BESYLATE 10 MG: 5 TABLET ORAL at 15:39

## 2019-01-01 RX ADMIN — TICAGRELOR 90 MG: 90 TABLET ORAL at 21:22

## 2019-01-01 RX ADMIN — SODIUM BICARBONATE 650 MG TABLET 650 MG: at 21:11

## 2019-01-01 RX ADMIN — FENTANYL CITRATE 25 MCG: 50 INJECTION, SOLUTION INTRAMUSCULAR; INTRAVENOUS at 10:25

## 2019-01-01 RX ADMIN — WARFARIN SODIUM 6 MG: 1 TABLET ORAL at 15:36

## 2019-01-01 RX ADMIN — SODIUM BICARBONATE 650 MG TABLET 650 MG: at 21:32

## 2019-01-01 RX ADMIN — Medication 10 ML: at 10:04

## 2019-01-01 RX ADMIN — SODIUM BICARBONATE 1300 MG: 650 TABLET ORAL at 21:47

## 2019-01-01 RX ADMIN — ONDANSETRON 4 MG: 2 INJECTION INTRAMUSCULAR; INTRAVENOUS at 16:33

## 2019-01-01 RX ADMIN — SIMVASTATIN 20 MG: 10 TABLET, FILM COATED ORAL at 21:59

## 2019-01-01 RX ADMIN — SODIUM BICARBONATE 650 MG TABLET 650 MG: at 08:30

## 2019-01-01 RX ADMIN — WARFARIN SODIUM 4 MG: 2 TABLET ORAL at 21:41

## 2019-01-01 RX ADMIN — Medication 10 ML: at 04:59

## 2019-01-01 RX ADMIN — METOPROLOL TARTRATE 25 MG: 25 TABLET ORAL at 10:55

## 2019-01-01 RX ADMIN — Medication 10 ML: at 21:22

## 2019-01-01 RX ADMIN — HYDRALAZINE HYDROCHLORIDE 50 MG: 50 TABLET, FILM COATED ORAL at 15:00

## 2019-01-01 RX ADMIN — TICAGRELOR 90 MG: 90 TABLET ORAL at 08:30

## 2019-01-01 RX ADMIN — ASPIRIN 81 MG 81 MG: 81 TABLET ORAL at 08:45

## 2019-01-01 RX ADMIN — SODIUM BICARBONATE 650 MG TABLET 650 MG: at 15:36

## 2019-01-01 RX ADMIN — HYDRALAZINE HYDROCHLORIDE 50 MG: 50 TABLET, FILM COATED ORAL at 20:50

## 2019-01-01 RX ADMIN — NITROGLYCERIN 1 INCH: 20 OINTMENT TOPICAL at 00:37

## 2019-01-01 RX ADMIN — FENTANYL CITRATE 25 MCG: 50 INJECTION, SOLUTION INTRAMUSCULAR; INTRAVENOUS at 10:00

## 2019-01-01 RX ADMIN — HYDROMORPHONE HYDROCHLORIDE 0.5 MG: 1 INJECTION, SOLUTION INTRAMUSCULAR; INTRAVENOUS; SUBCUTANEOUS at 14:42

## 2019-01-01 RX ADMIN — SODIUM CHLORIDE: 9 INJECTION, SOLUTION INTRAVENOUS at 10:37

## 2019-01-01 RX ADMIN — SODIUM BICARBONATE: 84 INJECTION, SOLUTION INTRAVENOUS at 03:25

## 2019-01-01 RX ADMIN — FENTANYL CITRATE 25 MCG: 50 INJECTION, SOLUTION INTRAMUSCULAR; INTRAVENOUS at 10:10

## 2019-01-01 RX ADMIN — FENTANYL CITRATE 25 MCG: 50 INJECTION, SOLUTION INTRAMUSCULAR; INTRAVENOUS at 16:20

## 2019-01-01 RX ADMIN — HYDRALAZINE HYDROCHLORIDE 50 MG: 50 TABLET, FILM COATED ORAL at 05:14

## 2019-01-01 RX ADMIN — Medication 10 ML: at 21:42

## 2019-01-01 RX ADMIN — HYDRALAZINE HYDROCHLORIDE 25 MG: 25 TABLET, FILM COATED ORAL at 22:00

## 2019-01-01 RX ADMIN — FENTANYL CITRATE 50 MCG: 50 INJECTION INTRAMUSCULAR; INTRAVENOUS at 10:41

## 2019-01-01 RX ADMIN — SODIUM BICARBONATE 650 MG TABLET 650 MG: at 08:06

## 2019-01-01 RX ADMIN — METOPROLOL TARTRATE 12.5 MG: 25 TABLET ORAL at 13:47

## 2019-01-01 RX ADMIN — METOPROLOL TARTRATE 12.5 MG: 25 TABLET ORAL at 08:44

## 2019-01-01 RX ADMIN — METOPROLOL TARTRATE 25 MG: 25 TABLET ORAL at 21:58

## 2019-01-01 RX ADMIN — Medication 100 MCG: at 12:25

## 2019-01-01 RX ADMIN — SODIUM BICARBONATE 650 MG TABLET 650 MG: at 10:03

## 2019-01-01 RX ADMIN — HYDRALAZINE HYDROCHLORIDE 25 MG: 25 TABLET, FILM COATED ORAL at 06:12

## 2019-01-01 RX ADMIN — SODIUM CHLORIDE: 9 INJECTION, SOLUTION INTRAVENOUS at 17:36

## 2019-01-01 RX ADMIN — ASPIRIN 81 MG 81 MG: 81 TABLET ORAL at 08:06

## 2019-01-01 RX ADMIN — HYDRALAZINE HYDROCHLORIDE 10 MG: 20 INJECTION INTRAMUSCULAR; INTRAVENOUS at 03:26

## 2019-01-01 RX ADMIN — HYDRALAZINE HYDROCHLORIDE 50 MG: 50 TABLET, FILM COATED ORAL at 20:07

## 2019-01-01 RX ADMIN — TICAGRELOR 90 MG: 90 TABLET ORAL at 22:44

## 2019-01-01 RX ADMIN — HYDRALAZINE HYDROCHLORIDE 25 MG: 25 TABLET, FILM COATED ORAL at 21:47

## 2019-01-01 RX ADMIN — Medication 10 ML: at 15:44

## 2019-01-01 RX ADMIN — HYDROCODONE BITARTRATE AND ACETAMINOPHEN 1 TABLET: 7.5; 325 TABLET ORAL at 09:07

## 2019-01-01 RX ADMIN — SODIUM BICARBONATE 1300 MG: 650 TABLET ORAL at 22:00

## 2019-01-01 RX ADMIN — SODIUM BICARBONATE 650 MG TABLET 650 MG: at 13:47

## 2019-01-01 RX ADMIN — CEPHALEXIN 500 MG: 250 CAPSULE ORAL at 07:55

## 2019-01-01 RX ADMIN — METOPROLOL TARTRATE 12.5 MG: 25 TABLET ORAL at 22:40

## 2019-01-01 RX ADMIN — MORPHINE SULFATE 2 MG: 2 INJECTION, SOLUTION INTRAMUSCULAR; INTRAVENOUS at 05:59

## 2019-01-01 RX ADMIN — HYDROCODONE BITARTRATE AND ACETAMINOPHEN 1 TABLET: 7.5; 325 TABLET ORAL at 00:47

## 2019-01-01 RX ADMIN — Medication 100 MCG: at 12:18

## 2019-01-01 RX ADMIN — HYDROMORPHONE HYDROCHLORIDE 0.5 MG: 1 INJECTION, SOLUTION INTRAMUSCULAR; INTRAVENOUS; SUBCUTANEOUS at 14:14

## 2019-01-01 RX ADMIN — TICAGRELOR 90 MG: 90 TABLET ORAL at 20:50

## 2019-01-01 RX ADMIN — METOPROLOL TARTRATE 25 MG: 25 TABLET ORAL at 21:47

## 2019-01-01 RX ADMIN — NITROGLYCERIN 1 INCH: 20 OINTMENT TOPICAL at 04:58

## 2019-01-01 RX ADMIN — Medication 10 ML: at 21:12

## 2019-01-01 RX ADMIN — TICAGRELOR 90 MG: 90 TABLET ORAL at 08:45

## 2019-01-01 RX ADMIN — Medication 10 ML: at 21:33

## 2019-01-01 RX ADMIN — Medication 200 MCG: at 13:24

## 2019-01-01 RX ADMIN — HYDRALAZINE HYDROCHLORIDE 50 MG: 50 TABLET, FILM COATED ORAL at 05:35

## 2019-01-01 RX ADMIN — SODIUM BICARBONATE 650 MG TABLET 650 MG: at 20:50

## 2019-01-01 RX ADMIN — HYDRALAZINE HYDROCHLORIDE 50 MG: 50 TABLET, FILM COATED ORAL at 05:19

## 2019-01-01 RX ADMIN — METOPROLOL SUCCINATE 50 MG: 50 TABLET, EXTENDED RELEASE ORAL at 07:55

## 2019-01-01 RX ADMIN — AMLODIPINE BESYLATE 5 MG: 5 TABLET ORAL at 21:32

## 2019-01-01 RX ADMIN — INSULIN HUMAN 10 UNITS: 100 INJECTION, SOLUTION PARENTERAL at 11:59

## 2019-01-01 RX ADMIN — TICAGRELOR 90 MG: 90 TABLET ORAL at 08:32

## 2019-01-01 RX ADMIN — Medication 10 ML: at 22:41

## 2019-01-01 RX ADMIN — Medication 10 ML: at 03:26

## 2019-01-01 RX ADMIN — ENOXAPARIN SODIUM 40 MG: 40 INJECTION SUBCUTANEOUS at 18:30

## 2019-01-01 RX ADMIN — ATORVASTATIN CALCIUM 80 MG: 80 TABLET, FILM COATED ORAL at 20:50

## 2019-01-01 RX ADMIN — Medication 250 ML: at 10:18

## 2019-01-01 RX ADMIN — Medication 10 ML: at 08:46

## 2019-01-01 RX ADMIN — SODIUM BICARBONATE: 84 INJECTION, SOLUTION INTRAVENOUS at 18:19

## 2019-01-01 RX ADMIN — Medication 100 MCG: at 12:09

## 2019-01-01 RX ADMIN — ATORVASTATIN CALCIUM 80 MG: 80 TABLET, FILM COATED ORAL at 22:41

## 2019-01-01 RX ADMIN — ASPIRIN 325 MG: 325 TABLET, DELAYED RELEASE ORAL at 04:58

## 2019-01-01 RX ADMIN — NITROGLYCERIN 1 INCH: 20 OINTMENT TOPICAL at 22:05

## 2019-01-01 RX ADMIN — TICAGRELOR 90 MG: 90 TABLET ORAL at 08:06

## 2019-01-01 RX ADMIN — SIMVASTATIN 20 MG: 10 TABLET, FILM COATED ORAL at 21:22

## 2019-01-01 RX ADMIN — FENTANYL CITRATE 50 MCG: 50 INJECTION, SOLUTION INTRAMUSCULAR; INTRAVENOUS at 11:30

## 2019-01-01 RX ADMIN — PHENYLEPHRINE HYDROCHLORIDE 10 MCG/MIN: 10 INJECTION INTRAVENOUS at 11:22

## 2019-01-01 RX ADMIN — Medication 250 ML: at 10:42

## 2019-01-01 RX ADMIN — ASPIRIN 81 MG 81 MG: 81 TABLET ORAL at 08:30

## 2019-01-01 RX ADMIN — HYDROCODONE BITARTRATE AND ACETAMINOPHEN 1 TABLET: 7.5; 325 TABLET ORAL at 15:43

## 2019-01-01 RX ADMIN — SODIUM CHLORIDE: 9 INJECTION, SOLUTION INTRAVENOUS at 18:01

## 2019-01-01 RX ADMIN — SIMVASTATIN 20 MG: 10 TABLET, FILM COATED ORAL at 22:41

## 2019-01-01 RX ADMIN — Medication 10 ML: at 09:37

## 2019-01-01 RX ADMIN — OXYCODONE 5 MG: 5 TABLET ORAL at 04:33

## 2019-01-01 RX ADMIN — Medication 10 ML: at 08:44

## 2019-01-01 RX ADMIN — ACETAMINOPHEN 650 MG: 325 TABLET ORAL at 18:18

## 2019-01-01 RX ADMIN — Medication 10 ML: at 20:51

## 2019-01-01 RX ADMIN — NITROGLYCERIN 1 INCH: 20 OINTMENT TOPICAL at 06:49

## 2019-01-01 RX ADMIN — SIMVASTATIN 20 MG: 10 TABLET, FILM COATED ORAL at 21:46

## 2019-01-01 RX ADMIN — MIDAZOLAM HYDROCHLORIDE 1 MG: 5 INJECTION INTRAMUSCULAR; INTRAVENOUS at 11:04

## 2019-01-01 RX ADMIN — ETOMIDATE 30 MG: 2 INJECTION INTRAVENOUS at 10:43

## 2019-01-01 RX ADMIN — ASPIRIN 81 MG 81 MG: 81 TABLET ORAL at 08:31

## 2019-01-01 RX ADMIN — FENTANYL CITRATE 25 MCG: 50 INJECTION, SOLUTION INTRAMUSCULAR; INTRAVENOUS at 16:06

## 2019-01-01 RX ADMIN — HYDRALAZINE HYDROCHLORIDE 50 MG: 50 TABLET, FILM COATED ORAL at 15:39

## 2019-01-01 RX ADMIN — FENTANYL CITRATE 25 MCG: 50 INJECTION, SOLUTION INTRAMUSCULAR; INTRAVENOUS at 10:49

## 2019-01-01 RX ADMIN — SODIUM BICARBONATE 650 MG TABLET 650 MG: at 15:00

## 2019-01-01 RX ADMIN — ASPIRIN 81 MG 81 MG: 81 TABLET ORAL at 08:44

## 2019-01-01 RX ADMIN — HYDROCODONE BITARTRATE AND ACETAMINOPHEN 1 TABLET: 7.5; 325 TABLET ORAL at 21:03

## 2019-01-01 RX ADMIN — HYDRALAZINE HYDROCHLORIDE 25 MG: 25 TABLET, FILM COATED ORAL at 09:12

## 2019-01-01 RX ADMIN — HYDRALAZINE HYDROCHLORIDE 10 MG: 20 INJECTION INTRAMUSCULAR; INTRAVENOUS at 18:41

## 2019-01-01 RX ADMIN — HYDRALAZINE HYDROCHLORIDE 50 MG: 50 TABLET, FILM COATED ORAL at 13:11

## 2019-01-01 RX ADMIN — TICAGRELOR 90 MG: 90 TABLET ORAL at 09:36

## 2019-01-01 RX ADMIN — HYDROCODONE BITARTRATE AND ACETAMINOPHEN 1 TABLET: 7.5; 325 TABLET ORAL at 14:39

## 2019-01-01 RX ADMIN — Medication 10 ML: at 10:56

## 2019-01-01 RX ADMIN — ATORVASTATIN CALCIUM 80 MG: 80 TABLET, FILM COATED ORAL at 21:11

## 2019-01-01 RX ADMIN — ROCURONIUM BROMIDE 50 MG: 10 SOLUTION INTRAVENOUS at 10:43

## 2019-01-01 RX ADMIN — ATORVASTATIN CALCIUM 80 MG: 80 TABLET, FILM COATED ORAL at 20:29

## 2019-01-01 RX ADMIN — HYDROCODONE BITARTRATE AND ACETAMINOPHEN 1 TABLET: 7.5; 325 TABLET ORAL at 08:49

## 2019-01-01 RX ADMIN — SODIUM BICARBONATE 1300 MG: 650 TABLET ORAL at 10:55

## 2019-01-01 RX ADMIN — MIDAZOLAM HYDROCHLORIDE 1 MG: 5 INJECTION INTRAMUSCULAR; INTRAVENOUS at 16:07

## 2019-01-01 RX ADMIN — AMLODIPINE BESYLATE 5 MG: 5 TABLET ORAL at 12:04

## 2019-01-01 RX ADMIN — ASPIRIN 81 MG 81 MG: 81 TABLET ORAL at 07:55

## 2019-01-01 RX ADMIN — MIDAZOLAM HYDROCHLORIDE 1 MG: 5 INJECTION INTRAMUSCULAR; INTRAVENOUS at 10:49

## 2019-01-01 RX ADMIN — FUROSEMIDE 20 MG: 20 TABLET ORAL at 10:54

## 2019-01-01 RX ADMIN — FUROSEMIDE 20 MG: 20 TABLET ORAL at 12:04

## 2019-01-01 RX ADMIN — AMLODIPINE BESYLATE 5 MG: 5 TABLET ORAL at 09:12

## 2019-01-01 RX ADMIN — SODIUM BICARBONATE 650 MG TABLET 650 MG: at 20:29

## 2019-01-01 RX ADMIN — SIMVASTATIN 20 MG: 10 TABLET, FILM COATED ORAL at 22:28

## 2019-01-01 RX ADMIN — Medication 10 ML: at 09:12

## 2019-01-01 RX ADMIN — FUROSEMIDE 20 MG: 20 TABLET ORAL at 07:56

## 2019-01-01 RX ADMIN — FUROSEMIDE 40 MG: 10 INJECTION, SOLUTION INTRAMUSCULAR; INTRAVENOUS at 15:46

## 2019-01-01 RX ADMIN — SODIUM BICARBONATE 650 MG TABLET 650 MG: at 07:55

## 2019-01-01 RX ADMIN — HYDRALAZINE HYDROCHLORIDE 50 MG: 50 TABLET, FILM COATED ORAL at 13:47

## 2019-01-01 RX ADMIN — HYDRALAZINE HYDROCHLORIDE 50 MG: 50 TABLET, FILM COATED ORAL at 22:41

## 2019-01-01 RX ADMIN — SUGAMMADEX 200 MG: 100 INJECTION, SOLUTION INTRAVENOUS at 13:44

## 2019-01-01 RX ADMIN — TICAGRELOR 90 MG: 90 TABLET ORAL at 07:55

## 2019-01-01 RX ADMIN — LIDOCAINE HYDROCHLORIDE 3 ML: 20 INJECTION, SOLUTION INFILTRATION; PERINEURAL at 10:43

## 2019-01-01 RX ADMIN — SODIUM BICARBONATE 650 MG TABLET 650 MG: at 21:42

## 2019-01-01 RX ADMIN — ATORVASTATIN CALCIUM 80 MG: 80 TABLET, FILM COATED ORAL at 21:32

## 2019-01-01 RX ADMIN — FENTANYL CITRATE 50 MCG: 50 INJECTION INTRAMUSCULAR; INTRAVENOUS at 13:50

## 2019-01-01 RX ADMIN — AMLODIPINE BESYLATE 5 MG: 5 TABLET ORAL at 09:36

## 2019-01-01 RX ADMIN — TICAGRELOR 90 MG: 90 TABLET ORAL at 21:11

## 2019-01-01 RX ADMIN — MIDAZOLAM HYDROCHLORIDE 1 MG: 5 INJECTION INTRAMUSCULAR; INTRAVENOUS at 10:10

## 2019-01-01 RX ADMIN — SODIUM CHLORIDE: 9 INJECTION, SOLUTION INTRAVENOUS at 20:28

## 2019-01-01 RX ADMIN — MORPHINE SULFATE 2 MG: 2 INJECTION, SOLUTION INTRAMUSCULAR; INTRAVENOUS at 04:58

## 2019-01-01 RX ADMIN — ROCURONIUM BROMIDE 50 MG: 10 SOLUTION INTRAVENOUS at 13:05

## 2019-01-01 RX ADMIN — HEPARIN SODIUM 2000 UNITS: 1000 INJECTION, SOLUTION INTRAVENOUS; SUBCUTANEOUS at 11:34

## 2019-01-01 RX ADMIN — TICAGRELOR 90 MG: 90 TABLET ORAL at 15:39

## 2019-01-01 RX ADMIN — SODIUM BICARBONATE 650 MG TABLET 650 MG: at 08:45

## 2019-01-01 RX ADMIN — Medication 10 ML: at 21:44

## 2019-01-01 RX ADMIN — AMLODIPINE BESYLATE 5 MG: 5 TABLET ORAL at 10:54

## 2019-01-01 RX ADMIN — METOPROLOL TARTRATE 25 MG: 25 TABLET ORAL at 21:41

## 2019-01-01 RX ADMIN — SODIUM BICARBONATE 650 MG TABLET 650 MG: at 08:44

## 2019-01-01 RX ADMIN — HYDRALAZINE HYDROCHLORIDE 50 MG: 50 TABLET, FILM COATED ORAL at 13:55

## 2019-01-01 RX ADMIN — HYDRALAZINE HYDROCHLORIDE 25 MG: 25 TABLET, FILM COATED ORAL at 14:00

## 2019-01-01 RX ADMIN — SODIUM BICARBONATE 1300 MG: 650 TABLET ORAL at 12:50

## 2019-01-01 RX ADMIN — Medication 0.5 MG: at 13:58

## 2019-01-01 RX ADMIN — FENTANYL CITRATE 25 MCG: 50 INJECTION, SOLUTION INTRAMUSCULAR; INTRAVENOUS at 10:57

## 2019-01-01 RX ADMIN — TICAGRELOR 90 MG: 90 TABLET ORAL at 15:46

## 2019-01-01 RX ADMIN — SODIUM BICARBONATE 650 MG TABLET 650 MG: at 22:40

## 2019-01-01 RX ADMIN — CALCIUM GLUCONATE 1 G: 98 INJECTION, SOLUTION INTRAVENOUS at 15:44

## 2019-01-01 RX ADMIN — FENTANYL CITRATE 25 MCG: 50 INJECTION, SOLUTION INTRAMUSCULAR; INTRAVENOUS at 10:50

## 2019-01-01 RX ADMIN — FENTANYL CITRATE 50 MCG: 50 INJECTION, SOLUTION INTRAMUSCULAR; INTRAVENOUS at 11:04

## 2019-01-01 RX ADMIN — ASPIRIN 81 MG 81 MG: 81 TABLET ORAL at 15:46

## 2019-01-01 RX ADMIN — FUROSEMIDE 40 MG: 10 INJECTION, SOLUTION INTRAMUSCULAR; INTRAVENOUS at 15:35

## 2019-01-01 RX ADMIN — HYDRALAZINE HYDROCHLORIDE 25 MG: 25 TABLET, FILM COATED ORAL at 21:33

## 2019-01-01 RX ADMIN — NITROGLYCERIN 1 INCH: 20 OINTMENT TOPICAL at 12:51

## 2019-01-01 RX ADMIN — NITROGLYCERIN 1 INCH: 20 OINTMENT TOPICAL at 18:00

## 2019-01-01 RX ADMIN — HEPARIN SODIUM 5000 UNITS: 1000 INJECTION, SOLUTION INTRAVENOUS; SUBCUTANEOUS at 11:17

## 2019-01-01 RX ADMIN — ATORVASTATIN CALCIUM 80 MG: 80 TABLET, FILM COATED ORAL at 20:08

## 2019-01-01 RX ADMIN — PHYTONADIONE 5 MG: 5 TABLET ORAL at 10:54

## 2019-01-01 RX ADMIN — FUROSEMIDE 40 MG: 10 INJECTION, SOLUTION INTRAMUSCULAR; INTRAVENOUS at 09:36

## 2019-01-01 RX ADMIN — Medication 10 ML: at 22:29

## 2019-01-01 RX ADMIN — ASPIRIN 81 MG 81 MG: 81 TABLET ORAL at 09:36

## 2019-01-01 RX ADMIN — Medication 10 ML: at 21:06

## 2019-01-01 RX ADMIN — TICAGRELOR 90 MG: 90 TABLET ORAL at 21:42

## 2019-01-01 RX ADMIN — SODIUM BICARBONATE: 84 INJECTION, SOLUTION INTRAVENOUS at 17:10

## 2019-01-01 RX ADMIN — Medication 10 ML: at 20:08

## 2019-01-01 RX ADMIN — FUROSEMIDE 20 MG: 20 TABLET ORAL at 09:12

## 2019-01-01 RX ADMIN — ALBUTEROL SULFATE 2.5 MG: 2.5 SOLUTION RESPIRATORY (INHALATION) at 11:59

## 2019-01-01 RX ADMIN — SODIUM BICARBONATE 650 MG TABLET 650 MG: at 20:08

## 2019-01-01 RX ADMIN — Medication 100 MCG: at 12:02

## 2019-01-01 RX ADMIN — ATENOLOL 25 MG: 25 TABLET ORAL at 15:46

## 2019-01-01 RX ADMIN — HYDRALAZINE HYDROCHLORIDE 50 MG: 50 TABLET, FILM COATED ORAL at 21:11

## 2019-01-01 RX ADMIN — CEPHALEXIN 500 MG: 250 CAPSULE ORAL at 21:42

## 2019-01-01 RX ADMIN — HYDROMORPHONE HYDROCHLORIDE 0.5 MG: 1 INJECTION, SOLUTION INTRAMUSCULAR; INTRAVENOUS; SUBCUTANEOUS at 14:35

## 2019-01-01 RX ADMIN — MIDAZOLAM HYDROCHLORIDE 1 MG: 5 INJECTION INTRAMUSCULAR; INTRAVENOUS at 10:07

## 2019-01-01 RX ADMIN — Medication 10 ML: at 06:00

## 2019-01-01 RX ADMIN — DEXTROSE 50 % IN WATER (D50W) INTRAVENOUS SYRINGE 25 G: at 15:34

## 2019-01-01 RX ADMIN — AMLODIPINE BESYLATE 10 MG: 5 TABLET ORAL at 10:04

## 2019-01-01 RX ADMIN — Medication 10 ML: at 21:11

## 2019-01-01 RX ADMIN — TICAGRELOR 90 MG: 90 TABLET ORAL at 20:29

## 2019-01-01 RX ADMIN — Medication 10 ML: at 20:29

## 2019-01-01 RX ADMIN — ASPIRIN 81 MG 81 MG: 81 TABLET ORAL at 10:54

## 2019-01-01 RX ADMIN — TICAGRELOR 90 MG: 90 TABLET ORAL at 08:44

## 2019-01-01 RX ADMIN — HYDRALAZINE HYDROCHLORIDE 25 MG: 25 TABLET, FILM COATED ORAL at 06:49

## 2019-01-01 RX ADMIN — FENTANYL CITRATE 25 MCG: 50 INJECTION, SOLUTION INTRAMUSCULAR; INTRAVENOUS at 09:52

## 2019-01-01 RX ADMIN — DEXTROSE 50 % IN WATER (D50W) INTRAVENOUS SYRINGE 25 G: at 11:59

## 2019-01-01 RX ADMIN — MIDAZOLAM HYDROCHLORIDE 1 MG: 5 INJECTION INTRAMUSCULAR; INTRAVENOUS at 11:30

## 2019-01-01 RX ADMIN — ATORVASTATIN CALCIUM 80 MG: 80 TABLET, FILM COATED ORAL at 21:41

## 2019-01-01 RX ADMIN — HYDRALAZINE HYDROCHLORIDE 50 MG: 50 TABLET, FILM COATED ORAL at 06:11

## 2019-01-01 RX ADMIN — Medication 10 ML: at 07:56

## 2019-01-01 RX ADMIN — Medication 100 MCG: at 12:00

## 2019-01-01 RX ADMIN — NITROGLYCERIN 1 INCH: 20 OINTMENT TOPICAL at 17:39

## 2019-01-01 RX ADMIN — TICAGRELOR 90 MG: 90 TABLET ORAL at 21:03

## 2019-01-01 RX ADMIN — ALBUTEROL SULFATE 5 MG: 2.5 SOLUTION RESPIRATORY (INHALATION) at 15:34

## 2019-01-01 RX ADMIN — HYDRALAZINE HYDROCHLORIDE 25 MG: 25 TABLET, FILM COATED ORAL at 14:16

## 2019-01-01 RX ADMIN — NITROGLYCERIN 1 INCH: 20 OINTMENT TOPICAL at 06:13

## 2019-01-01 RX ADMIN — METOPROLOL TARTRATE 25 MG: 25 TABLET ORAL at 09:12

## 2019-01-01 RX ADMIN — MIDAZOLAM HYDROCHLORIDE 1 MG: 5 INJECTION INTRAMUSCULAR; INTRAVENOUS at 09:52

## 2019-01-01 RX ADMIN — INSULIN HUMAN 10 UNITS: 100 INJECTION, SOLUTION PARENTERAL at 15:37

## 2019-01-01 ASSESSMENT — PULMONARY FUNCTION TESTS
PIF_VALUE: 25
PIF_VALUE: 19
PIF_VALUE: 23
PIF_VALUE: 24
PIF_VALUE: 23
PIF_VALUE: 25
PIF_VALUE: 21
PIF_VALUE: 23
PIF_VALUE: 24
PIF_VALUE: 4
PIF_VALUE: 24
PIF_VALUE: 23
PIF_VALUE: 24
PIF_VALUE: 23
PIF_VALUE: 0
PIF_VALUE: 25
PIF_VALUE: 23
PIF_VALUE: 20
PIF_VALUE: 23
PIF_VALUE: 23
PIF_VALUE: 0
PIF_VALUE: 23
PIF_VALUE: 1
PIF_VALUE: 23
PIF_VALUE: 23
PIF_VALUE: 0
PIF_VALUE: 23
PIF_VALUE: 24
PIF_VALUE: 23
PIF_VALUE: 23
PIF_VALUE: 24
PIF_VALUE: 22
PIF_VALUE: 23
PIF_VALUE: 23
PIF_VALUE: 30
PIF_VALUE: 24
PIF_VALUE: 23
PIF_VALUE: 22
PIF_VALUE: 0
PIF_VALUE: 23
PIF_VALUE: 24
PIF_VALUE: 23
PIF_VALUE: 22
PIF_VALUE: 24
PIF_VALUE: 23
PIF_VALUE: 24
PIF_VALUE: 24
PIF_VALUE: 23
PIF_VALUE: 24
PIF_VALUE: 0
PIF_VALUE: 23
PIF_VALUE: 24
PIF_VALUE: 13
PIF_VALUE: 23
PIF_VALUE: 23
PIF_VALUE: 25
PIF_VALUE: 23
PIF_VALUE: 0
PIF_VALUE: 23
PIF_VALUE: 19
PIF_VALUE: 2
PIF_VALUE: 0
PIF_VALUE: 24
PIF_VALUE: 5
PIF_VALUE: 23
PIF_VALUE: 21
PIF_VALUE: 23
PIF_VALUE: 24
PIF_VALUE: 23
PIF_VALUE: 0
PIF_VALUE: 23
PIF_VALUE: 24
PIF_VALUE: 23
PIF_VALUE: 0
PIF_VALUE: 23
PIF_VALUE: 30
PIF_VALUE: 23
PIF_VALUE: 3
PIF_VALUE: 24
PIF_VALUE: 23
PIF_VALUE: 23
PIF_VALUE: 24
PIF_VALUE: 23
PIF_VALUE: 41
PIF_VALUE: 23
PIF_VALUE: 24
PIF_VALUE: 24
PIF_VALUE: 23
PIF_VALUE: 25
PIF_VALUE: 19
PIF_VALUE: 26
PIF_VALUE: 19
PIF_VALUE: 23
PIF_VALUE: 20
PIF_VALUE: 23
PIF_VALUE: 23
PIF_VALUE: 22
PIF_VALUE: 27
PIF_VALUE: 29
PIF_VALUE: 23
PIF_VALUE: 4
PIF_VALUE: 22
PIF_VALUE: 19
PIF_VALUE: 27
PIF_VALUE: 24
PIF_VALUE: 22
PIF_VALUE: 24
PIF_VALUE: 23
PIF_VALUE: 25
PIF_VALUE: 24
PIF_VALUE: 0
PIF_VALUE: 23
PIF_VALUE: 1
PIF_VALUE: 24
PIF_VALUE: 23
PIF_VALUE: 35
PIF_VALUE: 23
PIF_VALUE: 25
PIF_VALUE: 23
PIF_VALUE: 26
PIF_VALUE: 25
PIF_VALUE: 24
PIF_VALUE: 23
PIF_VALUE: 20
PIF_VALUE: 19
PIF_VALUE: 22
PIF_VALUE: 1
PIF_VALUE: 23
PIF_VALUE: 0
PIF_VALUE: 24
PIF_VALUE: 24
PIF_VALUE: 26
PIF_VALUE: 26
PIF_VALUE: 24
PIF_VALUE: 23
PIF_VALUE: 24
PIF_VALUE: 24
PIF_VALUE: 27
PIF_VALUE: 24
PIF_VALUE: 23
PIF_VALUE: 19
PIF_VALUE: 23
PIF_VALUE: 20
PIF_VALUE: 26
PIF_VALUE: 20
PIF_VALUE: 22
PIF_VALUE: 20
PIF_VALUE: 23
PIF_VALUE: 19
PIF_VALUE: 41

## 2019-01-01 ASSESSMENT — PAIN DESCRIPTION - PROGRESSION
CLINICAL_PROGRESSION: NOT CHANGED
CLINICAL_PROGRESSION: NOT CHANGED

## 2019-01-01 ASSESSMENT — ENCOUNTER SYMPTOMS
EYE DISCHARGE: 0
RESPIRATORY NEGATIVE: 1
SHORTNESS OF BREATH: 1
GASTROINTESTINAL NEGATIVE: 1
VOMITING: 0
BACK PAIN: 1
SHORTNESS OF BREATH: 1
BACK PAIN: 1
SHORTNESS OF BREATH: 1
ABDOMINAL PAIN: 0
SHORTNESS OF BREATH: 1

## 2019-01-01 ASSESSMENT — PAIN SCALES - GENERAL
PAINLEVEL_OUTOF10: 2
PAINLEVEL_OUTOF10: 0
PAINLEVEL_OUTOF10: 9
PAINLEVEL_OUTOF10: 0
PAINLEVEL_OUTOF10: 9
PAINLEVEL_OUTOF10: 0
PAINLEVEL_OUTOF10: 7
PAINLEVEL_OUTOF10: 6
PAINLEVEL_OUTOF10: 0
PAINLEVEL_OUTOF10: 10
PAINLEVEL_OUTOF10: 10
PAINLEVEL_OUTOF10: 0
PAINLEVEL_OUTOF10: 7
PAINLEVEL_OUTOF10: 0
PAINLEVEL_OUTOF10: 8
PAINLEVEL_OUTOF10: 8
PAINLEVEL_OUTOF10: 0
PAINLEVEL_OUTOF10: 8
PAINLEVEL_OUTOF10: 0
PAINLEVEL_OUTOF10: 4
PAINLEVEL_OUTOF10: 5
PAINLEVEL_OUTOF10: 0
PAINLEVEL_OUTOF10: 8
PAINLEVEL_OUTOF10: 0
PAINLEVEL_OUTOF10: 0
PAINLEVEL_OUTOF10: 4
PAINLEVEL_OUTOF10: 6
PAINLEVEL_OUTOF10: 8
PAINLEVEL_OUTOF10: 0
PAINLEVEL_OUTOF10: 8

## 2019-01-01 ASSESSMENT — PAIN DESCRIPTION - PAIN TYPE
TYPE: CHRONIC PAIN
TYPE: ACUTE PAIN
TYPE: CHRONIC PAIN

## 2019-01-01 ASSESSMENT — PAIN DESCRIPTION - ORIENTATION
ORIENTATION: LOWER;MID
ORIENTATION: LOWER;MID
ORIENTATION: MID
ORIENTATION: LOWER;MID

## 2019-01-01 ASSESSMENT — PAIN - FUNCTIONAL ASSESSMENT: PAIN_FUNCTIONAL_ASSESSMENT: PREVENTS OR INTERFERES SOME ACTIVE ACTIVITIES AND ADLS

## 2019-01-01 ASSESSMENT — PAIN DESCRIPTION - LOCATION
LOCATION: BACK
LOCATION: CHEST

## 2019-01-01 ASSESSMENT — PAIN DESCRIPTION - ONSET
ONSET: PROGRESSIVE
ONSET: SUDDEN

## 2019-01-01 ASSESSMENT — PAIN DESCRIPTION - DESCRIPTORS
DESCRIPTORS: ACHING;CONSTANT;DISCOMFORT;PRESSURE
DESCRIPTORS: PRESSURE
DESCRIPTORS: ACHING;DISCOMFORT
DESCRIPTORS: ACHING;CONSTANT;DISCOMFORT

## 2019-01-01 ASSESSMENT — PAIN DESCRIPTION - FREQUENCY: FREQUENCY: INTERMITTENT

## 2019-01-10 ENCOUNTER — ANTI-COAG VISIT (OUTPATIENT)
Dept: PHARMACY | Age: 84
End: 2019-01-10
Payer: MEDICARE

## 2019-01-10 DIAGNOSIS — I48.91 ATRIAL FIBRILLATION, UNSPECIFIED TYPE (HCC): ICD-10-CM

## 2019-01-10 LAB — INTERNATIONAL NORMALIZATION RATIO, POC: 2.1

## 2019-01-10 PROCEDURE — 85610 PROTHROMBIN TIME: CPT | Performed by: PHARMACIST

## 2019-01-10 PROCEDURE — 99211 OFF/OP EST MAY X REQ PHY/QHP: CPT | Performed by: PHARMACIST

## 2019-02-21 DIAGNOSIS — M25.551 RIGHT HIP PAIN: Primary | ICD-10-CM

## 2019-02-26 ENCOUNTER — HOSPITAL ENCOUNTER (OUTPATIENT)
Dept: WOUND CARE | Age: 84
Discharge: HOME | End: 2019-02-26
Payer: MEDICARE

## 2019-02-26 VITALS
HEART RATE: 57 BPM | BODY MASS INDEX: 39.65 KG/M2 | HEIGHT: 70 IN | TEMPERATURE: 98.4 F | RESPIRATION RATE: 20 BRPM | WEIGHT: 277 LBS | DIASTOLIC BLOOD PRESSURE: 76 MMHG | SYSTOLIC BLOOD PRESSURE: 193 MMHG

## 2019-02-26 PROCEDURE — 97597 DBRDMT OPN WND 1ST 20 CM/<: CPT | Performed by: NURSE PRACTITIONER

## 2019-02-26 PROCEDURE — 97598 DBRDMT OPN WND ADDL 20CM/<: CPT

## 2019-02-26 PROCEDURE — 99213 OFFICE O/P EST LOW 20 MIN: CPT | Performed by: NURSE PRACTITIONER

## 2019-02-26 PROCEDURE — 99213 OFFICE O/P EST LOW 20 MIN: CPT

## 2019-02-26 PROCEDURE — 97597 DBRDMT OPN WND 1ST 20 CM/<: CPT

## 2019-02-26 PROCEDURE — 97598 DBRDMT OPN WND ADDL 20CM/<: CPT | Performed by: NURSE PRACTITIONER

## 2019-02-26 RX ORDER — SPIRONOLACTONE 25 MG/1
25 TABLET ORAL DAILY
COMMUNITY
End: 2019-01-01 | Stop reason: SINTOL

## 2019-02-26 RX ORDER — LIDOCAINE HYDROCHLORIDE 40 MG/ML
SOLUTION TOPICAL ONCE
Status: DISCONTINUED | OUTPATIENT
Start: 2019-02-26 | End: 2019-02-27 | Stop reason: HOSPADM

## 2019-03-05 ENCOUNTER — HOSPITAL ENCOUNTER (OUTPATIENT)
Dept: WOUND CARE | Age: 84
Discharge: HOME | End: 2019-03-05
Payer: MEDICARE

## 2019-03-05 ENCOUNTER — ANTI-COAG VISIT (OUTPATIENT)
Dept: PHARMACY | Age: 84
End: 2019-03-05
Payer: MEDICARE

## 2019-03-05 VITALS
TEMPERATURE: 97.3 F | RESPIRATION RATE: 20 BRPM | SYSTOLIC BLOOD PRESSURE: 230 MMHG | BODY MASS INDEX: 39.34 KG/M2 | WEIGHT: 274.8 LBS | DIASTOLIC BLOOD PRESSURE: 100 MMHG | HEART RATE: 55 BPM | HEIGHT: 70 IN

## 2019-03-05 DIAGNOSIS — I48.91 ATRIAL FIBRILLATION, UNSPECIFIED TYPE (HCC): ICD-10-CM

## 2019-03-05 LAB — INTERNATIONAL NORMALIZATION RATIO, POC: 1.7

## 2019-03-05 PROCEDURE — 29580 STRAPPING UNNA BOOT: CPT

## 2019-03-05 PROCEDURE — 99213 OFFICE O/P EST LOW 20 MIN: CPT | Performed by: NURSE PRACTITIONER

## 2019-03-05 PROCEDURE — 99211 OFF/OP EST MAY X REQ PHY/QHP: CPT | Performed by: PHARMACIST

## 2019-03-05 PROCEDURE — 85610 PROTHROMBIN TIME: CPT | Performed by: PHARMACIST

## 2019-03-05 RX ORDER — LIDOCAINE 40 MG/G
CREAM TOPICAL
Status: DISPENSED | OUTPATIENT
Start: 2019-03-05 | End: 2019-03-05

## 2019-03-08 ENCOUNTER — HOSPITAL ENCOUNTER (OUTPATIENT)
Dept: WOUND CARE | Age: 84
Discharge: HOME | End: 2019-03-08
Payer: MEDICARE

## 2019-03-08 VITALS
TEMPERATURE: 97.7 F | RESPIRATION RATE: 22 BRPM | WEIGHT: 266 LBS | HEIGHT: 70 IN | DIASTOLIC BLOOD PRESSURE: 68 MMHG | HEART RATE: 56 BPM | SYSTOLIC BLOOD PRESSURE: 155 MMHG | BODY MASS INDEX: 38.08 KG/M2

## 2019-03-08 PROCEDURE — 29580 STRAPPING UNNA BOOT: CPT

## 2019-03-12 ENCOUNTER — HOSPITAL ENCOUNTER (OUTPATIENT)
Dept: WOUND CARE | Age: 84
Discharge: HOME | End: 2019-03-12
Payer: MEDICARE

## 2019-03-12 VITALS
HEART RATE: 56 BPM | RESPIRATION RATE: 20 BRPM | WEIGHT: 265 LBS | BODY MASS INDEX: 37.94 KG/M2 | TEMPERATURE: 98.5 F | DIASTOLIC BLOOD PRESSURE: 67 MMHG | HEIGHT: 70 IN | SYSTOLIC BLOOD PRESSURE: 162 MMHG

## 2019-03-12 PROCEDURE — 97597 DBRDMT OPN WND 1ST 20 CM/<: CPT

## 2019-03-12 PROCEDURE — 97597 DBRDMT OPN WND 1ST 20 CM/<: CPT | Performed by: INTERNAL MEDICINE

## 2019-03-12 PROCEDURE — 29580 STRAPPING UNNA BOOT: CPT

## 2019-03-12 RX ORDER — LIDOCAINE HYDROCHLORIDE 40 MG/ML
SOLUTION TOPICAL ONCE
Status: DISCONTINUED | OUTPATIENT
Start: 2019-03-12 | End: 2019-03-13 | Stop reason: HOSPADM

## 2019-03-19 ENCOUNTER — HOSPITAL ENCOUNTER (OUTPATIENT)
Dept: WOUND CARE | Age: 84
Discharge: HOME | End: 2019-03-19
Payer: MEDICARE

## 2019-03-19 VITALS
HEIGHT: 70 IN | TEMPERATURE: 97.8 F | WEIGHT: 260 LBS | RESPIRATION RATE: 18 BRPM | BODY MASS INDEX: 37.22 KG/M2 | HEART RATE: 54 BPM | DIASTOLIC BLOOD PRESSURE: 73 MMHG | SYSTOLIC BLOOD PRESSURE: 181 MMHG

## 2019-03-19 PROCEDURE — 29580 STRAPPING UNNA BOOT: CPT

## 2019-03-19 PROCEDURE — 97597 DBRDMT OPN WND 1ST 20 CM/<: CPT

## 2019-03-19 PROCEDURE — 97597 DBRDMT OPN WND 1ST 20 CM/<: CPT | Performed by: NURSE PRACTITIONER

## 2019-03-19 RX ORDER — LIDOCAINE 40 MG/G
CREAM TOPICAL ONCE
Status: DISCONTINUED | OUTPATIENT
Start: 2019-03-19 | End: 2019-03-20 | Stop reason: HOSPADM

## 2019-03-19 ASSESSMENT — PAIN SCALES - GENERAL: PAINLEVEL_OUTOF10: 0

## 2019-03-20 PROBLEM — L03.115 CELLULITIS OF RIGHT ANTERIOR LOWER LEG: Status: RESOLVED | Noted: 2018-05-10 | Resolved: 2019-03-20

## 2019-03-26 ENCOUNTER — HOSPITAL ENCOUNTER (OUTPATIENT)
Dept: WOUND CARE | Age: 84
Discharge: HOME | End: 2019-03-26
Payer: MEDICARE

## 2019-03-29 ENCOUNTER — HOSPITAL ENCOUNTER (OUTPATIENT)
Dept: WOUND CARE | Age: 84
Discharge: HOME | End: 2019-03-29
Payer: MEDICARE

## 2019-03-29 VITALS
DIASTOLIC BLOOD PRESSURE: 76 MMHG | WEIGHT: 261.65 LBS | HEART RATE: 53 BPM | TEMPERATURE: 98 F | HEIGHT: 70 IN | RESPIRATION RATE: 18 BRPM | SYSTOLIC BLOOD PRESSURE: 188 MMHG | BODY MASS INDEX: 37.46 KG/M2

## 2019-03-29 PROCEDURE — 29580 STRAPPING UNNA BOOT: CPT

## 2019-04-02 ENCOUNTER — HOSPITAL ENCOUNTER (OUTPATIENT)
Dept: WOUND CARE | Age: 84
Discharge: HOME | End: 2019-04-02
Payer: MEDICARE

## 2019-04-02 VITALS
WEIGHT: 267.2 LBS | HEART RATE: 49 BPM | RESPIRATION RATE: 20 BRPM | TEMPERATURE: 97.5 F | SYSTOLIC BLOOD PRESSURE: 153 MMHG | HEIGHT: 70 IN | DIASTOLIC BLOOD PRESSURE: 68 MMHG | BODY MASS INDEX: 38.25 KG/M2

## 2019-04-02 PROCEDURE — 29580 STRAPPING UNNA BOOT: CPT | Performed by: NURSE PRACTITIONER

## 2019-04-02 PROCEDURE — 29580 STRAPPING UNNA BOOT: CPT

## 2019-04-04 NOTE — PROGRESS NOTES
[facility] Progress Note    Bisi Bradley     : 1935    DATE OF VISIT:  2019    Subjective:     Bisi Bradley is a 81 y.o. male who has a venous ulcer located on the Bilateral leg. Other significant symptoms or pertinent ulcer history: Wounds have resolved yet skin is fragile. Will continue compression this week with uniboots. Time spent discussing need for long-term compression and fitting for garment. Patient denies fever chills malaise. Appetite good. Mr. Donna Hale has a past medical history of Arthritis, Atrial flutter with rapid ventricular response ([address] Blood transfusion, Cellulitis, Cellulitis of right anterior lower leg, CHF (congestive heart failure) ([address] CHF exacerbation ([address] DVT (deep venous thrombosis) ([address] Gout, Hyperlipidemia, Hypertension, Irregular heart beat, and Prostate cancer ([address] He has a past surgical history that includes Prostate surgery; Cholecystectomy; joint replacement (11); eye surgery (13); and other surgical history (2017). His family history includes Arthritis in his sister; Asthma in his brother; Heart Disease in his mother. Mr. Donna Hale reports that he quit smoking about 48 years ago. His smoking use included pipe. He quit smokeless tobacco use about 57 years ago. His smokeless tobacco use included chew. He reports that he does not drink alcohol or use drugs. His current medication list consists of atenolol, furosemide, simvastatin, spironolactone, and warfarin. Allergies: Sulfa antibiotics    Pertinent items from the review of systems are discussed in the HPI; the remainder of the ROS was reviewed and is negative.      Objective:     Vitals:    19 1349 19 1421   BP: (!) 190/74 (!) 153/68   Pulse: 53 (!) 49   Resp: 20    Temp: 97.5 °F (36.4 °C)    TempSrc: Oral    Weight: 267 lb 3.2 oz (121.2 kg)    Height: 5' 10\" (1.778 m)    BRANDT  19  L leg noncompressible,  R leg unable to back pain    General Appearance: alert and oriented to person, place and time, well developed and obese in no acute distress. Cardiovascular:+ Lymphedema bilaterally    Meron-ulcer skin: Scattered dry crusting skin  Ulcer(s): Wounds have resolved, no drainage. Photos also saved in electronic chart.     Wound measurements today    Wound 02/26/19 #9 right lower leg, circumferential, venous, partial thickness, (July 2018)  just appeared-Wound Length (cm): 0 cm  Wound 02/26/19 #10 left lower leg, circumferential, venous, partial thickness, (July 2018) just appeared-Wound Length (cm): 0 cm   Wound 02/26/19 #9 right lower leg, circumferential, venous, partial thickness, (July 2018)  just appeared-Wound Width (cm): 0 cm  Wound 02/26/19 #10 left lower leg, circumferential, venous, partial thickness, (July 2018) just appeared-Wound Width (cm): 0 cm  Wound 02/26/19 #9 right lower leg, circumferential, venous, partial thickness, (July 2018)  just appeared-Wound Depth (cm): 0 cm  Wound 02/26/19 #10 left lower leg, circumferential, venous, partial thickness, (July 2018) just appeared-Wound Depth (cm): 0 cm      Lab Results   Component Value Date    LABALBU 4.0 01/14/2016     Lab Results   Component Value Date    CREATININE 1.9 (H) 01/15/2016     Lab Results   Component Value Date    HGB 13.5 01/15/2016     Lab Results   Component Value Date    LABA1C 5.3 05/17/2011     Assessment:     Patient Active Problem List   Diagnosis Code    Status post hip replacement Z96.649    CHF (congestive heart failure) ([facility]) I50.9    Atrial fibrillation ([facility]) I48.91    DVT (deep venous thrombosis) ([facility]) I82.409    Anticoagulated on Coumadin Z51.81, Z79.01    Renal insufficiency N28.9    Neoplasm of uncertain behavior of skin D48.5    Venous insufficiency I87.2    Lymphedema I89.0    Chronic ulcer of right leg, limited to breakdown of skin ([facility] L97.911    Chronic ulcer of left leg, limited to breakdown of skin ([facility] L97.921 Assessment of today's active condition(s): Celso VLU with phlebolymphedema- wounds have resolved although patient needs long-term compression and to be monitored this next week. Education is important for compliance. Factors contributing to occurrence and/or persistence of the chronic ulcer include edema, venous stasis, lymphedema, decreased mobility, decreased tissue oxygenation and poor hygiene. Medical necessity of today's visit is shown by the above documentation. Sharp debridement is  not indicated today, based upon the exam findings in the ulcer(s) above. Per physician order, bilateral application of Unna boot was performed in the wound care center today, to help manage edema, stasis dermatitis, and/or venous ulcers. Leave primary dressing, Unna boot and secondary layer(s) in place until the next appointment. Also discussed ways to keep the wrap dry for a shower, including a plastic cast-guard, available in retail pharmacies. Discharge plan:   Dictated by by a room for bilateral compression garment. Patient will bring next week as an appointment for proper application and education. Treatment in the wound care center today: Wound 02/26/19 #9 right lower leg, circumferential, venous, partial thickness, (July 2018)  just appeared-Dressing/Treatment: (aquaphor cast padding unna coban tubigrip)  Wound 02/26/19 #10 left lower leg, circumferential, venous, partial thickness, (July 2018) just appeared-Dressing/Treatment: (aquaphor cast padding unna coban tubigrip). BOTH LOWER LEGS:  Home treatment: good handwashing before and after any dressing changes. Cleanse ulcer with saline or soap & water before dressing change. May use Vaseline (petrolatum), Aquaphor, Aveeno, CeraVe, Cetaphil, Eucerin, Lubriderm, etc for dry skin. Dressing type for home: Vaash and Aquaphor prior to application of uniboot  Compression: Uniboot bilaterally    Written discharge instructions given to patient.   Elevate legs above heart 30 minutes 3 times a day [place] chair. Increase protein in diet. Follow up in 1 day.     Electronically signed by CALIXTO Valverde CNP on 4/4/2019 at 11:34 AM.

## 2019-04-09 ENCOUNTER — HOSPITAL ENCOUNTER (OUTPATIENT)
Dept: WOUND CARE | Age: 84
Discharge: HOME | End: 2019-04-09
Payer: MEDICARE

## 2019-04-09 VITALS
BODY MASS INDEX: 37.8 KG/M2 | SYSTOLIC BLOOD PRESSURE: 149 MMHG | WEIGHT: 264 LBS | TEMPERATURE: 97.9 F | RESPIRATION RATE: 18 BRPM | HEART RATE: 54 BPM | DIASTOLIC BLOOD PRESSURE: 62 MMHG | HEIGHT: 70 IN

## 2019-04-09 PROCEDURE — 99212 OFFICE O/P EST SF 10 MIN: CPT

## 2019-04-09 PROCEDURE — 99212 OFFICE O/P EST SF 10 MIN: CPT | Performed by: NURSE PRACTITIONER

## 2019-04-09 ASSESSMENT — PAIN SCALES - GENERAL: PAINLEVEL_OUTOF10: 0

## 2019-04-09 NOTE — PLAN OF CARE
Pt seen in [facility] - instructed pt to apply and cont compression with circaides- Reviewed AVS - treatment completed - Pt encouraged to f/u with PCP in 1 month

## 2019-04-10 NOTE — PROGRESS NOTES
2/26/19  L leg noncompressible,  R leg unable to back pain    General Appearance: alert and oriented to person, place and time, well developed and obese in no acute distress. Cardiovascular:+ Lymphedema bilaterally    Meron-ulcer skin: Scattered dry crusting skin  Ulcer(s): Wounds have resolved, no drainage. Photos also saved in electronic chart.     Wound measurements today    [REMOVED] Wound 02/26/19 #9 right lower leg, circumferential, venous, partial thickness, (July 2018)  just appeared-Wound Length (cm): 0 cm  [REMOVED] Wound 02/26/19 #10 left lower leg, circumferential, venous, partial thickness, (July 2018) just appeared-Wound Length (cm): 0 cm   [REMOVED] Wound 02/26/19 #9 right lower leg, circumferential, venous, partial thickness, (July 2018)  just appeared-Wound Width (cm): 0 cm  [REMOVED] Wound 02/26/19 #10 left lower leg, circumferential, venous, partial thickness, (July 2018) just appeared-Wound Width (cm): 0 cm  [REMOVED] Wound 02/26/19 #9 right lower leg, circumferential, venous, partial thickness, (July 2018)  just appeared-Wound Depth (cm): 0 cm  [REMOVED] Wound 02/26/19 #10 left lower leg, circumferential, venous, partial thickness, (July 2018) just appeared-Wound Depth (cm): 0 cm      Lab Results   Component Value Date    LABALBU 4.0 01/14/2016     Lab Results   Component Value Date    CREATININE 1.9 (H) 01/15/2016     Lab Results   Component Value Date    HGB 13.5 01/15/2016     Lab Results   Component Value Date    LABA1C 5.3 05/17/2011     Assessment:     Patient Active Problem List   Diagnosis Code    Status post hip replacement Z96.649    CHF (congestive heart failure) ([facility]) I50.9    Atrial fibrillation ([facility]) I48.91    DVT (deep venous thrombosis) ([facility]) I82.409    Anticoagulated on Coumadin Z51.81, Z79.01    Renal insufficiency N28.9    Neoplasm of uncertain behavior of skin D48.5    Venous insufficiency I87.2    Lymphedema I89.0    Chronic ulcer of right leg, limited to breakdown of skin ([address] L97.911    Chronic ulcer of left leg, limited to breakdown of skin ([address] L97.921       Assessment of today's active condition(s): Celso VLU with phlebolymphedema- wounds have resolved although patient needs long-term compression and to be monitored by PCP  Education is important for compliance. Factors contributing to occurrence and/or persistence of the chronic ulcer include edema, venous stasis, lymphedema, decreased mobility, decreased tissue oxygenation and poor hygiene. Medical necessity of today's visit is shown by the above documentation. Sharp debridement is  not indicated today, based upon the exam findings in the ulcer(s) above. Discharge plan:   Wear Compression garment during day - may remove at night if pt has help applying if he cannot do himself. He states daughter visits weekly    Treatment in the wound care center today: [REMOVED] Wound 02/26/19 #9 right lower leg, circumferential, venous, partial thickness, (July 2018)  just appeared-Dressing/Treatment: (Aquaphor,Circaid system)  [REMOVED] Wound 02/26/19 #10 left lower leg, circumferential, venous, partial thickness, (July 2018) just appeared-Dressing/Treatment: (Circaid system). BOTH LOWER LEGS:  Home treatment: good handwashing before and after any dressing changes. Cleanse ulcer with saline or soap & water before dressing change. May use Vaseline (petrolatum), Aquaphor, Aveeno, CeraVe, Cetaphil, Eucerin, Lubriderm, etc for dry skin. Written discharge instructions given to patient. Elevate legs above heart 30 minutes 3 times a day in [place] chair. Wt management encouraged. Follow up with PCP recommended in one month.   Electronically signed by CALIXTO Barillas CNP on 4/10/2019 at 1:03 PM.

## 2019-04-16 ENCOUNTER — ANTI-COAG VISIT (OUTPATIENT)
Dept: PHARMACY | Age: 84
End: 2019-04-16
Payer: MEDICARE

## 2019-04-16 ENCOUNTER — HOSPITAL ENCOUNTER (OUTPATIENT)
Dept: WOUND CARE | Age: 84
Discharge: HOME | End: 2019-04-16
Payer: MEDICARE

## 2019-04-16 VITALS
HEART RATE: 55 BPM | BODY MASS INDEX: 38.14 KG/M2 | HEIGHT: 70 IN | SYSTOLIC BLOOD PRESSURE: 149 MMHG | RESPIRATION RATE: 18 BRPM | WEIGHT: 266.4 LBS | TEMPERATURE: 97.2 F | DIASTOLIC BLOOD PRESSURE: 58 MMHG

## 2019-04-16 DIAGNOSIS — Z79.01 ANTICOAGULATED ON COUMADIN: ICD-10-CM

## 2019-04-16 DIAGNOSIS — I48.91 ATRIAL FIBRILLATION, UNSPECIFIED TYPE (HCC): ICD-10-CM

## 2019-04-16 LAB — INTERNATIONAL NORMALIZATION RATIO, POC: 2.2

## 2019-04-16 PROCEDURE — 99211 OFF/OP EST MAY X REQ PHY/QHP: CPT | Performed by: PHARMACIST

## 2019-04-16 PROCEDURE — 85610 PROTHROMBIN TIME: CPT | Performed by: PHARMACIST

## 2019-04-16 PROCEDURE — 99212 OFFICE O/P EST SF 10 MIN: CPT

## 2019-04-16 NOTE — PROGRESS NOTES
Patient and spouse present for re-education of Circaid application. Spouse appropriately,demonstrated application. Discussed importance of daily washing of feet/legs with application of Aquaphor. (His circaids have been on since last [facility] visit.

## 2019-04-16 NOTE — PLAN OF CARE
Patient and spouse present today to be re-educated about application of Circaids. Demonstrated proper application on left leg, wife properly applies to right leg. Discussed importance of elevation of BLE, exercise, and compression, daily. He verbalizes understanding.

## 2019-05-28 ENCOUNTER — ANTI-COAG VISIT (OUTPATIENT)
Dept: PHARMACY | Age: 84
End: 2019-05-28
Payer: MEDICARE

## 2019-05-28 DIAGNOSIS — I48.91 ATRIAL FIBRILLATION, UNSPECIFIED TYPE (HCC): ICD-10-CM

## 2019-05-28 DIAGNOSIS — Z79.01 ANTICOAGULATED ON COUMADIN: ICD-10-CM

## 2019-05-28 LAB — INR BLD: 2

## 2019-05-28 PROCEDURE — 85610 PROTHROMBIN TIME: CPT | Performed by: FAMILY MEDICINE

## 2019-05-28 PROCEDURE — 99211 OFF/OP EST MAY X REQ PHY/QHP: CPT | Performed by: FAMILY MEDICINE

## 2019-05-28 NOTE — PROGRESS NOTES
Patient presents with history of A-Fib and recurrent DVT , on long term anticoagulation with warfarin. He is here for anticoagulation monitoring and adjustment. 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 Denies bleeding/bruising  Denies blood in urine/stool  No changes in Rx/OTC/Herbal supplementation. Patient is not a smoker, he did state that he drinks one beer nightly. Pt denies any missed doses. Pt indicates that he is not a big vegetable eater. INR 2.0 is within acceptable therapeutic range (goal range of 2-3). Recommend to continue 2 mg daily, except 3 mg on Sun  He has Warfarin 4, 3, and 2 mg tablets at home. Will continue to monitor and check INR in  6 weeks, per pt request  Dosing card with dose and next appt time was given.   Return to clinic: 7/9 @ 1 pm      Patrick Villaseñor PharmADRIANA 5/28/2019 12:56 PM

## 2019-07-09 ENCOUNTER — ANTI-COAG VISIT (OUTPATIENT)
Dept: PHARMACY | Age: 84
End: 2019-07-09
Payer: MEDICARE

## 2019-07-09 DIAGNOSIS — I48.91 ATRIAL FIBRILLATION, UNSPECIFIED TYPE (HCC): ICD-10-CM

## 2019-07-09 DIAGNOSIS — Z79.01 ANTICOAGULATED ON COUMADIN: ICD-10-CM

## 2019-07-09 LAB — INTERNATIONAL NORMALIZATION RATIO, POC: 1.8

## 2019-07-09 PROCEDURE — 99211 OFF/OP EST MAY X REQ PHY/QHP: CPT | Performed by: PHARMACIST

## 2019-07-09 PROCEDURE — 85610 PROTHROMBIN TIME: CPT | Performed by: PHARMACIST

## 2019-08-20 ENCOUNTER — ANTI-COAG VISIT (OUTPATIENT)
Dept: PHARMACY | Age: 84
End: 2019-08-20
Payer: MEDICARE

## 2019-08-20 DIAGNOSIS — Z79.01 ANTICOAGULATED ON COUMADIN: ICD-10-CM

## 2019-08-20 DIAGNOSIS — I48.91 ATRIAL FIBRILLATION, UNSPECIFIED TYPE (HCC): ICD-10-CM

## 2019-08-20 LAB — INTERNATIONAL NORMALIZATION RATIO, POC: 1.7

## 2019-08-20 PROCEDURE — 85610 PROTHROMBIN TIME: CPT | Performed by: PHARMACIST

## 2019-08-20 PROCEDURE — 99211 OFF/OP EST MAY X REQ PHY/QHP: CPT | Performed by: PHARMACIST

## 2019-12-13 PROBLEM — E87.5 HYPERKALEMIA: Status: ACTIVE | Noted: 2019-01-01

## 2019-12-13 PROBLEM — N18.30 CKD (CHRONIC KIDNEY DISEASE) STAGE 3, GFR 30-59 ML/MIN (HCC): Status: ACTIVE | Noted: 2019-01-01

## 2019-12-15 PROBLEM — I20.0 UNSTABLE ANGINA (HCC): Status: ACTIVE | Noted: 2019-01-01

## 2019-12-19 PROBLEM — E66.9 OBESITY: Status: ACTIVE | Noted: 2019-01-01

## 2019-12-19 PROBLEM — I25.10 CAD (CORONARY ARTERY DISEASE): Status: ACTIVE | Noted: 2019-01-01

## 2019-12-27 PROBLEM — I45.5 SINUS PAUSE: Status: ACTIVE | Noted: 2019-01-01

## 2019-12-27 PROBLEM — I49.9 CARDIAC ARRHYTHMIA: Status: ACTIVE | Noted: 2019-01-01

## 2019-12-28 PROBLEM — Z95.0 PACEMAKER: Status: ACTIVE | Noted: 2019-01-01

## 2020-01-01 ENCOUNTER — ANTI-COAG VISIT (OUTPATIENT)
Dept: PHARMACY | Age: 85
End: 2020-01-01

## 2020-01-01 ENCOUNTER — ANTI-COAG VISIT (OUTPATIENT)
Dept: PHARMACY | Age: 85
End: 2020-01-01
Payer: MEDICARE

## 2020-01-01 ENCOUNTER — APPOINTMENT (OUTPATIENT)
Dept: INTERVENTIONAL RADIOLOGY/VASCULAR | Age: 85
DRG: 872 | End: 2020-01-01
Payer: MEDICARE

## 2020-01-01 ENCOUNTER — CARE COORDINATION (OUTPATIENT)
Dept: CASE MANAGEMENT | Age: 85
End: 2020-01-01

## 2020-01-01 ENCOUNTER — HOSPITAL ENCOUNTER (OUTPATIENT)
Age: 85
Discharge: HOME | End: 2020-01-06
Payer: MEDICARE

## 2020-01-01 ENCOUNTER — APPOINTMENT (OUTPATIENT)
Dept: GENERAL RADIOLOGY | Age: 85
End: 2020-01-01
Payer: MEDICARE

## 2020-01-01 ENCOUNTER — HOSPITAL ENCOUNTER (OUTPATIENT)
Age: 85
Discharge: HOME | End: 2020-01-10
Payer: MEDICARE

## 2020-01-01 ENCOUNTER — APPOINTMENT (OUTPATIENT)
Dept: GENERAL RADIOLOGY | Age: 85
DRG: 813 | End: 2020-01-01
Payer: MEDICARE

## 2020-01-01 ENCOUNTER — NURSE ONLY (OUTPATIENT)
Dept: CARDIOLOGY CLINIC | Age: 85
End: 2020-01-01
Payer: MEDICARE

## 2020-01-01 ENCOUNTER — OFFICE VISIT (OUTPATIENT)
Dept: CARDIOLOGY CLINIC | Age: 85
End: 2020-01-01
Payer: MEDICARE

## 2020-01-01 ENCOUNTER — HOSPITAL ENCOUNTER (OUTPATIENT)
Age: 85
Discharge: HOME | End: 2020-05-15
Payer: MEDICARE

## 2020-01-01 ENCOUNTER — APPOINTMENT (OUTPATIENT)
Dept: CT IMAGING | Age: 85
DRG: 872 | End: 2020-01-01
Payer: MEDICARE

## 2020-01-01 ENCOUNTER — ANESTHESIA (OUTPATIENT)
Dept: ENDOSCOPY | Age: 85
DRG: 813 | End: 2020-01-01
Payer: MEDICARE

## 2020-01-01 ENCOUNTER — HOSPITAL ENCOUNTER (INPATIENT)
Age: 85
LOS: 2 days | DRG: 813 | End: 2020-08-21
Attending: EMERGENCY MEDICINE | Admitting: INTERNAL MEDICINE
Payer: MEDICARE

## 2020-01-01 ENCOUNTER — TELEPHONE (OUTPATIENT)
Dept: INFECTIOUS DISEASES | Age: 85
End: 2020-01-01

## 2020-01-01 ENCOUNTER — TELEPHONE (OUTPATIENT)
Dept: CARDIOLOGY CLINIC | Age: 85
End: 2020-01-01

## 2020-01-01 ENCOUNTER — VIRTUAL VISIT (OUTPATIENT)
Dept: CARDIOLOGY CLINIC | Age: 85
End: 2020-01-01
Payer: MEDICARE

## 2020-01-01 ENCOUNTER — HOSPITAL ENCOUNTER (INPATIENT)
Age: 85
LOS: 3 days | Discharge: NURSING, REHAB OR LONG-TERM CARE | DRG: 872 | End: 2020-06-19
Attending: EMERGENCY MEDICINE | Admitting: INTERNAL MEDICINE
Payer: MEDICARE

## 2020-01-01 ENCOUNTER — HOSPITAL ENCOUNTER (EMERGENCY)
Age: 85
Discharge: HOME | End: 2020-05-20
Attending: EMERGENCY MEDICINE
Payer: MEDICARE

## 2020-01-01 ENCOUNTER — ANESTHESIA EVENT (OUTPATIENT)
Dept: ENDOSCOPY | Age: 85
DRG: 813 | End: 2020-01-01
Payer: MEDICARE

## 2020-01-01 ENCOUNTER — HOSPITAL ENCOUNTER (OUTPATIENT)
Dept: WOUND CARE | Age: 85
Discharge: HOME | End: 2020-01-07
Payer: MEDICARE

## 2020-01-01 ENCOUNTER — APPOINTMENT (OUTPATIENT)
Dept: GENERAL RADIOLOGY | Age: 85
DRG: 872 | End: 2020-01-01
Payer: MEDICARE

## 2020-01-01 VITALS
WEIGHT: 244 LBS | TEMPERATURE: 99.1 F | OXYGEN SATURATION: 100 % | HEART RATE: 98 BPM | DIASTOLIC BLOOD PRESSURE: 65 MMHG | HEIGHT: 70 IN | SYSTOLIC BLOOD PRESSURE: 153 MMHG | BODY MASS INDEX: 34.93 KG/M2 | RESPIRATION RATE: 20 BRPM

## 2020-01-01 VITALS
SYSTOLIC BLOOD PRESSURE: 134 MMHG | HEIGHT: 70 IN | OXYGEN SATURATION: 97 % | TEMPERATURE: 98.2 F | DIASTOLIC BLOOD PRESSURE: 64 MMHG | WEIGHT: 238.2 LBS | HEART RATE: 64 BPM | RESPIRATION RATE: 17 BRPM | BODY MASS INDEX: 34.1 KG/M2

## 2020-01-01 VITALS
SYSTOLIC BLOOD PRESSURE: 139 MMHG | BODY MASS INDEX: 37.94 KG/M2 | WEIGHT: 265 LBS | RESPIRATION RATE: 18 BRPM | TEMPERATURE: 98.1 F | HEART RATE: 67 BPM | DIASTOLIC BLOOD PRESSURE: 56 MMHG | HEIGHT: 70 IN

## 2020-01-01 VITALS
BODY MASS INDEX: 38.08 KG/M2 | OXYGEN SATURATION: 99 % | HEIGHT: 70 IN | WEIGHT: 266 LBS | HEART RATE: 61 BPM | DIASTOLIC BLOOD PRESSURE: 60 MMHG | SYSTOLIC BLOOD PRESSURE: 138 MMHG

## 2020-01-01 VITALS — WEIGHT: 245 LBS | BODY MASS INDEX: 35.07 KG/M2 | HEIGHT: 70 IN

## 2020-01-01 VITALS
TEMPERATURE: 97.4 F | WEIGHT: 240.7 LBS | OXYGEN SATURATION: 97 % | HEART RATE: 76 BPM | RESPIRATION RATE: 18 BRPM | HEIGHT: 70 IN | BODY MASS INDEX: 34.46 KG/M2 | SYSTOLIC BLOOD PRESSURE: 89 MMHG | DIASTOLIC BLOOD PRESSURE: 65 MMHG

## 2020-01-01 VITALS
DIASTOLIC BLOOD PRESSURE: 60 MMHG | SYSTOLIC BLOOD PRESSURE: 120 MMHG | HEIGHT: 70 IN | OXYGEN SATURATION: 100 % | BODY MASS INDEX: 38.2 KG/M2 | HEART RATE: 62 BPM | WEIGHT: 266.8 LBS

## 2020-01-01 VITALS
DIASTOLIC BLOOD PRESSURE: 62 MMHG | RESPIRATION RATE: 15 BRPM | OXYGEN SATURATION: 100 % | SYSTOLIC BLOOD PRESSURE: 88 MMHG

## 2020-01-01 LAB
A/G RATIO: 0.6 (ref 1.1–2.2)
A/G RATIO: 0.7 (ref 1.1–2.2)
A/G RATIO: 0.7 (ref 1.1–2.2)
A/G RATIO: 0.8 (ref 1.1–2.2)
A/G RATIO: 0.9 (ref 1.1–2.2)
ABO/RH: NORMAL
ABO/RH: NORMAL
ALBUMIN SERPL-MCNC: 2.1 G/DL
ALBUMIN SERPL-MCNC: 2.2 G/DL (ref 3.4–5)
ALBUMIN SERPL-MCNC: 2.4 G/DL (ref 3.4–5)
ALBUMIN SERPL-MCNC: 2.4 G/DL (ref 3.4–5)
ALBUMIN SERPL-MCNC: 2.5 G/DL
ALBUMIN SERPL-MCNC: 2.6 G/DL
ALBUMIN SERPL-MCNC: 2.6 G/DL
ALBUMIN SERPL-MCNC: 2.6 G/DL (ref 3.4–5)
ALBUMIN SERPL-MCNC: 2.8 G/DL
ALBUMIN SERPL-MCNC: 2.8 G/DL (ref 3.4–5)
ALBUMIN SERPL-MCNC: 2.9 G/DL
ALBUMIN SERPL-MCNC: 2.9 G/DL (ref 3.4–5)
ALBUMIN SERPL-MCNC: 3 G/DL (ref 3.4–5)
ALBUMIN SERPL-MCNC: 3 G/DL (ref 3.4–5)
ALBUMIN SERPL-MCNC: 3.1 G/DL (ref 3.4–5)
ALBUMIN SERPL-MCNC: 3.6 G/DL (ref 3.4–5)
ALP BLD-CCNC: 1167 U/L
ALP BLD-CCNC: 142 U/L (ref 40–129)
ALP BLD-CCNC: 149 U/L (ref 40–129)
ALP BLD-CCNC: 167 U/L
ALP BLD-CCNC: 200 U/L (ref 40–129)
ALP BLD-CCNC: 201 U/L (ref 40–129)
ALP BLD-CCNC: 221 U/L (ref 40–129)
ALP BLD-CCNC: 224 U/L (ref 40–129)
ALP BLD-CCNC: 265 U/L
ALP BLD-CCNC: 265 U/L
ALP BLD-CCNC: 270 U/L
ALP BLD-CCNC: 270 U/L (ref 40–129)
ALP BLD-CCNC: 272 U/L
ALP BLD-CCNC: 276 U/L (ref 40–129)
ALT SERPL-CCNC: 10 U/L (ref 10–40)
ALT SERPL-CCNC: 11 U/L
ALT SERPL-CCNC: 11 U/L (ref 10–40)
ALT SERPL-CCNC: 13 U/L
ALT SERPL-CCNC: 14 U/L (ref 10–40)
ALT SERPL-CCNC: 16 U/L (ref 10–40)
ALT SERPL-CCNC: 16 U/L (ref 10–40)
ALT SERPL-CCNC: 17 U/L
ALT SERPL-CCNC: 17 U/L (ref 10–40)
ALT SERPL-CCNC: 20 U/L
ALT SERPL-CCNC: 37 U/L
ALT SERPL-CCNC: 8 U/L
ANAEROBIC CULTURE: NORMAL
ANION GAP SERPL CALCULATED.3IONS-SCNC: 10 MMOL/L (ref 3–16)
ANION GAP SERPL CALCULATED.3IONS-SCNC: 11 MMOL/L (ref 3–16)
ANION GAP SERPL CALCULATED.3IONS-SCNC: 12 MMOL/L (ref 3–16)
ANION GAP SERPL CALCULATED.3IONS-SCNC: 13 MMOL/L (ref 3–16)
ANION GAP SERPL CALCULATED.3IONS-SCNC: 15 MMOL/L (ref 3–16)
ANION GAP SERPL CALCULATED.3IONS-SCNC: 16 MMOL/L (ref 3–16)
ANION GAP SERPL CALCULATED.3IONS-SCNC: 16 MMOL/L (ref 3–16)
ANION GAP SERPL CALCULATED.3IONS-SCNC: 17 MMOL/L (ref 3–16)
ANION GAP SERPL CALCULATED.3IONS-SCNC: 17 MMOL/L (ref 3–16)
ANION GAP SERPL CALCULATED.3IONS-SCNC: 18 MMOL/L (ref 3–16)
ANION GAP SERPL CALCULATED.3IONS-SCNC: 18 MMOL/L (ref 3–16)
ANION GAP SERPL CALCULATED.3IONS-SCNC: 20 MMOL/L (ref 3–16)
ANION GAP SERPL CALCULATED.3IONS-SCNC: 23 MMOL/L (ref 3–16)
ANION GAP SERPL CALCULATED.3IONS-SCNC: 9 MMOL/L (ref 3–16)
ANION GAP SERPL CALCULATED.3IONS-SCNC: NORMAL MMOL/L
ANISOCYTOSIS: ABNORMAL
ANTIBODY SCREEN: NORMAL
ANTIBODY SCREEN: NORMAL
APTT: 63.5 SEC (ref 24.2–36.2)
AST SERPL-CCNC: 18 U/L
AST SERPL-CCNC: 22 U/L
AST SERPL-CCNC: 26 U/L
AST SERPL-CCNC: 26 U/L (ref 15–37)
AST SERPL-CCNC: 27 U/L (ref 15–37)
AST SERPL-CCNC: 31 U/L
AST SERPL-CCNC: 32 U/L
AST SERPL-CCNC: 43 U/L (ref 15–37)
AST SERPL-CCNC: 44 U/L (ref 15–37)
AST SERPL-CCNC: 48 U/L (ref 15–37)
AST SERPL-CCNC: 65 U/L (ref 15–37)
AST SERPL-CCNC: 70 U/L (ref 15–37)
AST SERPL-CCNC: 74 U/L
AST SERPL-CCNC: 94 U/L (ref 15–37)
BACTERIA: ABNORMAL /HPF
BACTERIA: ABNORMAL /HPF
BANDED NEUTROPHILS RELATIVE PERCENT: 12 % (ref 0–7)
BASOPHILS ABSOLUTE: 0 K/UL (ref 0–0.2)
BASOPHILS ABSOLUTE: 0.1 /ΜL
BASOPHILS ABSOLUTE: 0.1 /ΜL
BASOPHILS ABSOLUTE: 0.1 K/UL (ref 0–0.2)
BASOPHILS ABSOLUTE: 0.1 K/UL (ref 0–0.2)
BASOPHILS ABSOLUTE: 0.5 /ΜL
BASOPHILS ABSOLUTE: ABNORMAL
BASOPHILS RELATIVE PERCENT: 0 %
BASOPHILS RELATIVE PERCENT: 0 %
BASOPHILS RELATIVE PERCENT: 0.1 %
BASOPHILS RELATIVE PERCENT: 0.2 %
BASOPHILS RELATIVE PERCENT: 0.4 %
BASOPHILS RELATIVE PERCENT: 0.4 %
BASOPHILS RELATIVE PERCENT: 0.5 %
BASOPHILS RELATIVE PERCENT: 0.6 %
BASOPHILS RELATIVE PERCENT: 0.7 %
BASOPHILS RELATIVE PERCENT: 0.8 %
BASOPHILS RELATIVE PERCENT: 0.9 %
BASOPHILS RELATIVE PERCENT: 0.9 %
BILIRUB SERPL-MCNC: 0.5 MG/DL (ref 0.1–1.4)
BILIRUB SERPL-MCNC: 0.5 MG/DL (ref 0.1–1.4)
BILIRUB SERPL-MCNC: 0.6 MG/DL (ref 0.1–1.4)
BILIRUB SERPL-MCNC: 0.6 MG/DL (ref 0.1–1.4)
BILIRUB SERPL-MCNC: 0.6 MG/DL (ref 0–1)
BILIRUB SERPL-MCNC: 0.7 MG/DL (ref 0.1–1.4)
BILIRUB SERPL-MCNC: 0.7 MG/DL (ref 0.1–1.4)
BILIRUB SERPL-MCNC: 0.7 MG/DL (ref 0–1)
BILIRUB SERPL-MCNC: 0.8 MG/DL (ref 0–1)
BILIRUB SERPL-MCNC: 1.3 MG/DL (ref 0–1)
BILIRUB SERPL-MCNC: 1.3 MG/DL (ref 0–1)
BILIRUB SERPL-MCNC: 1.4 MG/DL (ref 0–1)
BILIRUBIN URINE: ABNORMAL
BILIRUBIN URINE: ABNORMAL
BLOOD BANK DISPENSE STATUS: NORMAL
BLOOD BANK PRODUCT CODE: NORMAL
BLOOD CULTURE, ROUTINE: NORMAL
BLOOD, URINE: ABNORMAL
BLOOD, URINE: NEGATIVE
BPU ID: NORMAL
BUN BLDV-MCNC: 12 MG/DL
BUN BLDV-MCNC: 12 MG/DL
BUN BLDV-MCNC: 13 MG/DL
BUN BLDV-MCNC: 17 MG/DL (ref 7–20)
BUN BLDV-MCNC: 18 MG/DL (ref 7–20)
BUN BLDV-MCNC: 19 MG/DL
BUN BLDV-MCNC: 19 MG/DL
BUN BLDV-MCNC: 19 MG/DL (ref 7–20)
BUN BLDV-MCNC: 20 MG/DL
BUN BLDV-MCNC: 20 MG/DL (ref 7–20)
BUN BLDV-MCNC: 22 MG/DL
BUN BLDV-MCNC: 22 MG/DL
BUN BLDV-MCNC: 22 MG/DL (ref 7–20)
BUN BLDV-MCNC: 25 MG/DL (ref 7–20)
BUN BLDV-MCNC: 26 MG/DL (ref 7–20)
BUN BLDV-MCNC: 27 MG/DL (ref 7–20)
BUN BLDV-MCNC: 29 MG/DL (ref 7–20)
BUN BLDV-MCNC: 31 MG/DL
BUN BLDV-MCNC: 31 MG/DL
BUN BLDV-MCNC: 62 MG/DL (ref 7–20)
BUN BLDV-MCNC: 64 MG/DL (ref 7–20)
BUN BLDV-MCNC: 65 MG/DL (ref 7–20)
BUN BLDV-MCNC: 71 MG/DL (ref 7–20)
BUN BLDV-MCNC: 72 MG/DL (ref 7–20)
C DIFF TOXIN/ANTIGEN: ABNORMAL
CALCIUM SERPL-MCNC: 7.7 MG/DL
CALCIUM SERPL-MCNC: 7.7 MG/DL
CALCIUM SERPL-MCNC: 8 MG/DL
CALCIUM SERPL-MCNC: 8 MG/DL (ref 8.3–10.6)
CALCIUM SERPL-MCNC: 8.2 MG/DL (ref 8.3–10.6)
CALCIUM SERPL-MCNC: 8.3 MG/DL
CALCIUM SERPL-MCNC: 8.3 MG/DL
CALCIUM SERPL-MCNC: 8.3 MG/DL (ref 8.3–10.6)
CALCIUM SERPL-MCNC: 8.3 MG/DL (ref 8.3–10.6)
CALCIUM SERPL-MCNC: 8.5 MG/DL (ref 8.3–10.6)
CALCIUM SERPL-MCNC: 8.5 MG/DL (ref 8.3–10.6)
CALCIUM SERPL-MCNC: 8.6 MG/DL
CALCIUM SERPL-MCNC: 8.6 MG/DL
CALCIUM SERPL-MCNC: 8.7 MG/DL (ref 8.3–10.6)
CALCIUM SERPL-MCNC: 8.7 MG/DL (ref 8.3–10.6)
CALCIUM SERPL-MCNC: 8.8 MG/DL
CALCIUM SERPL-MCNC: 8.8 MG/DL (ref 8.3–10.6)
CALCIUM SERPL-MCNC: 8.9 MG/DL (ref 8.3–10.6)
CALCIUM SERPL-MCNC: 8.9 MG/DL (ref 8.3–10.6)
CALCIUM SERPL-MCNC: 9 MG/DL
CALCIUM SERPL-MCNC: 9 MG/DL
CALCIUM SERPL-MCNC: 9.3 MG/DL (ref 8.3–10.6)
CALCIUM SERPL-MCNC: 9.3 MG/DL (ref 8.3–10.6)
CALCIUM SERPL-MCNC: 9.7 MG/DL (ref 8.3–10.6)
CHLORIDE BLD-SCNC: 100 MMOL/L
CHLORIDE BLD-SCNC: 100 MMOL/L (ref 99–110)
CHLORIDE BLD-SCNC: 100 MMOL/L (ref 99–110)
CHLORIDE BLD-SCNC: 101 MMOL/L
CHLORIDE BLD-SCNC: 101 MMOL/L
CHLORIDE BLD-SCNC: 102 MMOL/L
CHLORIDE BLD-SCNC: 102 MMOL/L
CHLORIDE BLD-SCNC: 102 MMOL/L (ref 99–110)
CHLORIDE BLD-SCNC: 103 MMOL/L
CHLORIDE BLD-SCNC: 103 MMOL/L (ref 99–110)
CHLORIDE BLD-SCNC: 104 MMOL/L (ref 99–110)
CHLORIDE BLD-SCNC: 105 MMOL/L (ref 99–110)
CHLORIDE BLD-SCNC: 105 MMOL/L (ref 99–110)
CHLORIDE BLD-SCNC: 106 MMOL/L (ref 99–110)
CHLORIDE BLD-SCNC: 107 MMOL/L
CHLORIDE BLD-SCNC: 108 MMOL/L
CHLORIDE BLD-SCNC: 108 MMOL/L
CHLORIDE BLD-SCNC: 115 MMOL/L
CHLORIDE BLD-SCNC: 97 MMOL/L (ref 99–110)
CHLORIDE BLD-SCNC: 98 MMOL/L (ref 99–110)
CHLORIDE BLD-SCNC: 99 MMOL/L (ref 99–110)
CLARITY: ABNORMAL
CLARITY: ABNORMAL
CO2: 13 MMOL/L (ref 21–32)
CO2: 15 MMOL/L (ref 21–32)
CO2: 18 MMOL/L (ref 21–32)
CO2: 20 MMOL/L
CO2: 21 MMOL/L
CO2: 21 MMOL/L (ref 21–32)
CO2: 22 MMOL/L
CO2: 22 MMOL/L (ref 21–32)
CO2: 24 MMOL/L
CO2: 24 MMOL/L (ref 21–32)
CO2: 24 MMOL/L (ref 21–32)
CO2: 25 MMOL/L
CO2: 27 MMOL/L
CO2: 27 MMOL/L (ref 21–32)
CO2: 28 MMOL/L
CO2: 29 MMOL/L
COLOR: ABNORMAL
COLOR: YELLOW
CREAT SERPL-MCNC: 1 MG/DL
CREAT SERPL-MCNC: 1.1 MG/DL
CREAT SERPL-MCNC: 1.1 MG/DL
CREAT SERPL-MCNC: 1.2 MG/DL (ref 0.8–1.3)
CREAT SERPL-MCNC: 1.3 MG/DL (ref 0.8–1.3)
CREAT SERPL-MCNC: 1.4 MG/DL
CREAT SERPL-MCNC: 1.4 MG/DL
CREAT SERPL-MCNC: 1.5 MG/DL
CREAT SERPL-MCNC: 1.5 MG/DL (ref 0.8–1.3)
CREAT SERPL-MCNC: 1.5 MG/DL (ref 0.8–1.3)
CREAT SERPL-MCNC: 1.6 MG/DL
CREAT SERPL-MCNC: 1.6 MG/DL
CREAT SERPL-MCNC: 1.6 MG/DL (ref 0.8–1.3)
CREAT SERPL-MCNC: 1.7 MG/DL (ref 0.8–1.3)
CREAT SERPL-MCNC: 1.7 MG/DL (ref 0.8–1.3)
CREAT SERPL-MCNC: 1.8 MG/DL
CREAT SERPL-MCNC: 1.8 MG/DL (ref 0.8–1.3)
CREAT SERPL-MCNC: 1.8 MG/DL (ref 0.8–1.3)
CREAT SERPL-MCNC: 1.9 MG/DL
CREAT SERPL-MCNC: 3.9 MG/DL (ref 0.8–1.3)
CREAT SERPL-MCNC: 4 MG/DL (ref 0.8–1.3)
CREAT SERPL-MCNC: 4.1 MG/DL (ref 0.8–1.3)
CREAT SERPL-MCNC: 5 MG/DL (ref 0.8–1.3)
CREAT SERPL-MCNC: 5.1 MG/DL (ref 0.8–1.3)
CRYPTOSPORIDIUM ANTIGEN STOOL: NORMAL
CRYSTALS, UA: ABNORMAL /HPF
CULTURE, BLOOD 2: NORMAL
DESCRIPTION BLOOD BANK: NORMAL
E HISTOLYTICA ANTIGEN STOOL: NORMAL
EKG ATRIAL RATE: 100 BPM
EKG ATRIAL RATE: 136 BPM
EKG ATRIAL RATE: 73 BPM
EKG DIAGNOSIS: NORMAL
EKG Q-T INTERVAL: 344 MS
EKG Q-T INTERVAL: 404 MS
EKG Q-T INTERVAL: 414 MS
EKG QRS DURATION: 132 MS
EKG QRS DURATION: 136 MS
EKG QRS DURATION: 154 MS
EKG QTC CALCULATION (BAZETT): 517 MS
EKG QTC CALCULATION (BAZETT): 521 MS
EKG QTC CALCULATION (BAZETT): 567 MS
EKG R AXIS: 111 DEGREES
EKG R AXIS: 86 DEGREES
EKG R AXIS: 88 DEGREES
EKG T AXIS: -83 DEGREES
EKG T AXIS: 10 DEGREES
EKG T AXIS: 5 DEGREES
EKG VENTRICULAR RATE: 100 BPM
EKG VENTRICULAR RATE: 113 BPM
EKG VENTRICULAR RATE: 136 BPM
EOSINOPHILS ABSOLUTE: 0 K/UL (ref 0–0.6)
EOSINOPHILS ABSOLUTE: 0.1 /ΜL
EOSINOPHILS ABSOLUTE: 0.1 K/UL (ref 0–0.6)
EOSINOPHILS ABSOLUTE: 0.1 K/UL (ref 0–0.6)
EOSINOPHILS ABSOLUTE: 0.2 K/UL (ref 0–0.6)
EOSINOPHILS ABSOLUTE: 0.3 /ΜL
EOSINOPHILS RELATIVE PERCENT: 0 %
EOSINOPHILS RELATIVE PERCENT: 0.4 %
EOSINOPHILS RELATIVE PERCENT: 0.8 %
EOSINOPHILS RELATIVE PERCENT: 1.2 %
EOSINOPHILS RELATIVE PERCENT: 1.4 %
EOSINOPHILS RELATIVE PERCENT: 1.7 %
EOSINOPHILS RELATIVE PERCENT: 1.7 %
EOSINOPHILS RELATIVE PERCENT: 2 %
EOSINOPHILS RELATIVE PERCENT: 2.2 %
EOSINOPHILS RELATIVE PERCENT: 2.6 %
EOSINOPHILS RELATIVE PERCENT: 2.7 %
EOSINOPHILS RELATIVE PERCENT: 3.2 %
EOSINOPHILS RELATIVE PERCENT: 4.1 %
EPITHELIAL CELLS, UA: ABNORMAL /HPF (ref 0–5)
EPITHELIAL CELLS, UA: ABNORMAL /HPF (ref 0–5)
FERRITIN: 208.4 NG/ML (ref 30–400)
GFR AFRICAN AMERICAN: 13
GFR AFRICAN AMERICAN: 13
GFR AFRICAN AMERICAN: 17
GFR AFRICAN AMERICAN: 17
GFR AFRICAN AMERICAN: 18
GFR AFRICAN AMERICAN: 44
GFR AFRICAN AMERICAN: 44
GFR AFRICAN AMERICAN: 47
GFR AFRICAN AMERICAN: 47
GFR AFRICAN AMERICAN: 50
GFR AFRICAN AMERICAN: 54
GFR AFRICAN AMERICAN: 54
GFR AFRICAN AMERICAN: >60
GFR AFRICAN AMERICAN: >60
GFR CALCULATED: NORMAL
GFR NON-AFRICAN AMERICAN: 11
GFR NON-AFRICAN AMERICAN: 11
GFR NON-AFRICAN AMERICAN: 14
GFR NON-AFRICAN AMERICAN: 14
GFR NON-AFRICAN AMERICAN: 15
GFR NON-AFRICAN AMERICAN: 36
GFR NON-AFRICAN AMERICAN: 36
GFR NON-AFRICAN AMERICAN: 39
GFR NON-AFRICAN AMERICAN: 39
GFR NON-AFRICAN AMERICAN: 41
GFR NON-AFRICAN AMERICAN: 45
GFR NON-AFRICAN AMERICAN: 45
GFR NON-AFRICAN AMERICAN: 53
GFR NON-AFRICAN AMERICAN: 58
GIARDIA ANTIGEN STOOL: NORMAL
GLOBULIN: 3 G/DL
GLOBULIN: 3.5 G/DL
GLOBULIN: 3.6 G/DL
GLOBULIN: 3.7 G/DL
GLOBULIN: 3.8 G/DL
GLOBULIN: 3.8 G/DL
GLOBULIN: 4 G/DL
GLOBULIN: 4.4 G/DL
GLUCOSE BLD-MCNC: 113 MG/DL (ref 70–99)
GLUCOSE BLD-MCNC: 116 MG/DL (ref 70–99)
GLUCOSE BLD-MCNC: 116 MG/DL (ref 70–99)
GLUCOSE BLD-MCNC: 131 MG/DL (ref 70–99)
GLUCOSE BLD-MCNC: 26 MG/DL (ref 70–99)
GLUCOSE BLD-MCNC: 55 MG/DL (ref 70–99)
GLUCOSE BLD-MCNC: 62 MG/DL (ref 70–99)
GLUCOSE BLD-MCNC: 71 MG/DL
GLUCOSE BLD-MCNC: 71 MG/DL
GLUCOSE BLD-MCNC: 72 MG/DL
GLUCOSE BLD-MCNC: 76 MG/DL
GLUCOSE BLD-MCNC: 79 MG/DL (ref 70–99)
GLUCOSE BLD-MCNC: 80 MG/DL (ref 70–99)
GLUCOSE BLD-MCNC: 82 MG/DL (ref 70–99)
GLUCOSE BLD-MCNC: 82 MG/DL (ref 70–99)
GLUCOSE BLD-MCNC: 84 MG/DL
GLUCOSE BLD-MCNC: 84 MG/DL
GLUCOSE BLD-MCNC: 86 MG/DL
GLUCOSE BLD-MCNC: 87 MG/DL (ref 70–99)
GLUCOSE BLD-MCNC: 89 MG/DL
GLUCOSE BLD-MCNC: 90 MG/DL
GLUCOSE BLD-MCNC: 91 MG/DL (ref 70–99)
GLUCOSE BLD-MCNC: 92 MG/DL (ref 70–99)
GLUCOSE BLD-MCNC: 94 MG/DL (ref 70–99)
GLUCOSE BLD-MCNC: 95 MG/DL
GLUCOSE BLD-MCNC: 99 MG/DL (ref 70–99)
GLUCOSE URINE: NEGATIVE MG/DL
GLUCOSE URINE: NEGATIVE MG/DL
GRAM STAIN RESULT: NORMAL
HCT VFR BLD CALC: 21.5 % (ref 40.5–52.5)
HCT VFR BLD CALC: 22.2 % (ref 41–53)
HCT VFR BLD CALC: 22.7 % (ref 41–53)
HCT VFR BLD CALC: 23 % (ref 40.5–52.5)
HCT VFR BLD CALC: 23.1 % (ref 40.5–52.5)
HCT VFR BLD CALC: 23.1 % (ref 41–53)
HCT VFR BLD CALC: 23.2 % (ref 40.5–52.5)
HCT VFR BLD CALC: 23.2 % (ref 41–53)
HCT VFR BLD CALC: 23.3 % (ref 41–53)
HCT VFR BLD CALC: 23.4 % (ref 40.5–52.5)
HCT VFR BLD CALC: 23.7 % (ref 40.5–52.5)
HCT VFR BLD CALC: 23.7 % (ref 41–53)
HCT VFR BLD CALC: 23.7 % (ref 41–53)
HCT VFR BLD CALC: 24 % (ref 40.5–52.5)
HCT VFR BLD CALC: 24.3 % (ref 41–53)
HCT VFR BLD CALC: 24.5 % (ref 40.5–52.5)
HCT VFR BLD CALC: 24.8 % (ref 40.5–52.5)
HCT VFR BLD CALC: 25 % (ref 40.5–52.5)
HCT VFR BLD CALC: 25.2 % (ref 40.5–52.5)
HCT VFR BLD CALC: 25.3 % (ref 40.5–52.5)
HCT VFR BLD CALC: 25.3 % (ref 40.5–52.5)
HCT VFR BLD CALC: 25.4 % (ref 40.5–52.5)
HCT VFR BLD CALC: 25.5 % (ref 40.5–52.5)
HCT VFR BLD CALC: 25.7 % (ref 40.5–52.5)
HCT VFR BLD CALC: 26 % (ref 40.5–52.5)
HCT VFR BLD CALC: 26 % (ref 40.5–52.5)
HCT VFR BLD CALC: 26.2 % (ref 40.5–52.5)
HCT VFR BLD CALC: 26.3 % (ref 40.5–52.5)
HCT VFR BLD CALC: 26.4 % (ref 41–53)
HCT VFR BLD CALC: 26.8 % (ref 40.5–52.5)
HCT VFR BLD CALC: 26.9 % (ref 41–53)
HCT VFR BLD CALC: 27.5 % (ref 41–53)
HCT VFR BLD CALC: 28.9 % (ref 40.5–52.5)
HEMOGLOBIN: 6.7 G/DL (ref 13.5–17.5)
HEMOGLOBIN: 7.1 G/DL (ref 13.5–17.5)
HEMOGLOBIN: 7.3 G/DL (ref 13.5–17.5)
HEMOGLOBIN: 7.4 G/DL (ref 13.5–17.5)
HEMOGLOBIN: 7.5 G/DL (ref 13.5–17.5)
HEMOGLOBIN: 7.6 G/DL (ref 13.5–17.5)
HEMOGLOBIN: 7.6 G/DL (ref 13.5–17.5)
HEMOGLOBIN: 7.7 G/DL (ref 13.5–17.5)
HEMOGLOBIN: 7.8 G/DL (ref 13.5–17.5)
HEMOGLOBIN: 7.9 G/DL (ref 13.5–17.5)
HEMOGLOBIN: 8 G/DL (ref 13.5–17.5)
HEMOGLOBIN: 8 G/DL (ref 13.5–17.5)
HEMOGLOBIN: 8.1 G/DL (ref 13.5–17.5)
HEMOGLOBIN: 8.2 G/DL (ref 13.5–17.5)
HEMOGLOBIN: 8.6 G/DL (ref 13.5–17.5)
HEMOGLOBIN: 8.9 G/DL (ref 13.5–17.5)
INR BLD: 1.19 (ref 0.86–1.14)
INR BLD: 1.21 (ref 0.86–1.14)
INR BLD: 1.23 (ref 0.86–1.14)
INR BLD: 1.37 (ref 0.86–1.14)
INR BLD: 1.39 (ref 0.86–1.14)
INR BLD: 1.55 (ref 0.86–1.14)
INR BLD: 1.91 (ref 0.86–1.14)
INR BLD: 2
INR BLD: 2 (ref 0.86–1.14)
INR BLD: 2.01 (ref 0.86–1.14)
INR BLD: 2.05 (ref 0.86–1.14)
INR BLD: 2.13 (ref 0.86–1.14)
INR BLD: 2.15 (ref 0.86–1.14)
INR BLD: 2.2 (ref 0.86–1.14)
INR BLD: 8.73 (ref 0.86–1.14)
INTERNATIONAL NORMALIZATION RATIO, POC: 1.4
IRON SATURATION: 14 % (ref 20–50)
IRON: 21 UG/DL (ref 59–158)
KETONES, URINE: ABNORMAL MG/DL
KETONES, URINE: NEGATIVE MG/DL
LACTIC ACID, SEPSIS: 1.2 MMOL/L (ref 0.4–1.9)
LACTIC ACID, SEPSIS: 1.6 MMOL/L (ref 0.4–1.9)
LACTIC ACID, SEPSIS: 2.1 MMOL/L (ref 0.4–1.9)
LACTIC ACID: 4.8 MMOL/L (ref 0.4–2)
LEUKOCYTE ESTERASE, URINE: ABNORMAL
LEUKOCYTE ESTERASE, URINE: ABNORMAL
LIPASE: 35 U/L (ref 13–60)
LIPASE: 36 U/L (ref 13–60)
LYMPHOCYTES ABSOLUTE: 0 K/UL (ref 1–5.1)
LYMPHOCYTES ABSOLUTE: 0.5 K/UL (ref 1–5.1)
LYMPHOCYTES ABSOLUTE: 0.9 K/UL (ref 1–5.1)
LYMPHOCYTES ABSOLUTE: 1 K/UL (ref 1–5.1)
LYMPHOCYTES ABSOLUTE: 1.1 K/UL (ref 1–5.1)
LYMPHOCYTES ABSOLUTE: 1.2 /ΜL
LYMPHOCYTES ABSOLUTE: 1.2 /ΜL
LYMPHOCYTES ABSOLUTE: 1.2 K/UL (ref 1–5.1)
LYMPHOCYTES ABSOLUTE: 1.3 /ΜL
LYMPHOCYTES ABSOLUTE: 1.3 K/UL (ref 1–5.1)
LYMPHOCYTES ABSOLUTE: 1.3 K/UL (ref 1–5.1)
LYMPHOCYTES ABSOLUTE: 1.4 /ΜL
LYMPHOCYTES ABSOLUTE: 1.5 /ΜL
LYMPHOCYTES ABSOLUTE: 1.6 /ΜL
LYMPHOCYTES ABSOLUTE: 1.7 /ΜL
LYMPHOCYTES ABSOLUTE: 1.7 K/UL (ref 1–5.1)
LYMPHOCYTES RELATIVE PERCENT: 1 %
LYMPHOCYTES RELATIVE PERCENT: 13 %
LYMPHOCYTES RELATIVE PERCENT: 13.1 %
LYMPHOCYTES RELATIVE PERCENT: 13.3 %
LYMPHOCYTES RELATIVE PERCENT: 14.4 %
LYMPHOCYTES RELATIVE PERCENT: 16.2 %
LYMPHOCYTES RELATIVE PERCENT: 17.7 %
LYMPHOCYTES RELATIVE PERCENT: 19.3 %
LYMPHOCYTES RELATIVE PERCENT: 21.5 %
LYMPHOCYTES RELATIVE PERCENT: 21.6 %
LYMPHOCYTES RELATIVE PERCENT: 21.7 %
LYMPHOCYTES RELATIVE PERCENT: 23 %
LYMPHOCYTES RELATIVE PERCENT: 23.1 %
LYMPHOCYTES RELATIVE PERCENT: 24.3 %
LYMPHOCYTES RELATIVE PERCENT: 3.4 %
LYMPHOCYTES RELATIVE PERCENT: 30.2 %
LYMPHOCYTES RELATIVE PERCENT: 31.4 %
LYMPHOCYTES RELATIVE PERCENT: 37.9 %
LYMPHOCYTES RELATIVE PERCENT: 8.1 %
LYMPHOCYTES RELATIVE PERCENT: 8.7 %
MAGNESIUM: 2 MG/DL (ref 1.8–2.4)
MCH RBC QN AUTO: 26.7 PG (ref 26–34)
MCH RBC QN AUTO: 27 PG (ref 26–34)
MCH RBC QN AUTO: 27 PG (ref 26–34)
MCH RBC QN AUTO: 27.1 PG
MCH RBC QN AUTO: 27.1 PG
MCH RBC QN AUTO: 27.1 PG (ref 26–34)
MCH RBC QN AUTO: 27.2 PG
MCH RBC QN AUTO: 27.2 PG
MCH RBC QN AUTO: 27.4 PG
MCH RBC QN AUTO: 27.4 PG
MCH RBC QN AUTO: 27.4 PG (ref 26–34)
MCH RBC QN AUTO: 27.4 PG (ref 26–34)
MCH RBC QN AUTO: 27.5 PG
MCH RBC QN AUTO: 27.6 PG
MCH RBC QN AUTO: 27.6 PG (ref 26–34)
MCH RBC QN AUTO: 27.6 PG (ref 26–34)
MCH RBC QN AUTO: 27.7 PG
MCH RBC QN AUTO: 27.8 PG (ref 26–34)
MCH RBC QN AUTO: 28.3 PG
MCH RBC QN AUTO: 28.6 PG
MCHC RBC AUTO-ENTMCNC: 30.4 G/DL (ref 31–36)
MCHC RBC AUTO-ENTMCNC: 30.9 G/DL (ref 31–36)
MCHC RBC AUTO-ENTMCNC: 30.9 G/DL (ref 31–36)
MCHC RBC AUTO-ENTMCNC: 31.1 G/DL
MCHC RBC AUTO-ENTMCNC: 31.2 G/DL (ref 31–36)
MCHC RBC AUTO-ENTMCNC: 31.3 G/DL
MCHC RBC AUTO-ENTMCNC: 31.3 G/DL (ref 31–36)
MCHC RBC AUTO-ENTMCNC: 31.4 G/DL
MCHC RBC AUTO-ENTMCNC: 31.4 G/DL
MCHC RBC AUTO-ENTMCNC: 31.4 G/DL (ref 31–36)
MCHC RBC AUTO-ENTMCNC: 31.6 G/DL
MCHC RBC AUTO-ENTMCNC: 31.6 G/DL
MCHC RBC AUTO-ENTMCNC: 31.6 G/DL (ref 31–36)
MCHC RBC AUTO-ENTMCNC: 32 G/DL
MCHC RBC AUTO-ENTMCNC: 32 G/DL (ref 31–36)
MCHC RBC AUTO-ENTMCNC: 32.2 G/DL
MCHC RBC AUTO-ENTMCNC: 32.4 G/DL
MCHC RBC AUTO-ENTMCNC: 32.4 G/DL (ref 31–36)
MCHC RBC AUTO-ENTMCNC: 33 G/DL
MCHC RBC AUTO-ENTMCNC: 33.1 G/DL
MCV RBC AUTO: 84.6 FL (ref 80–100)
MCV RBC AUTO: 85 FL (ref 80–100)
MCV RBC AUTO: 85.5 FL
MCV RBC AUTO: 85.7 FL
MCV RBC AUTO: 85.8 FL
MCV RBC AUTO: 85.9 FL
MCV RBC AUTO: 86.3 FL
MCV RBC AUTO: 86.5 FL
MCV RBC AUTO: 86.6 FL
MCV RBC AUTO: 86.8 FL
MCV RBC AUTO: 86.8 FL (ref 80–100)
MCV RBC AUTO: 86.9 FL
MCV RBC AUTO: 86.9 FL
MCV RBC AUTO: 87.2 FL
MCV RBC AUTO: 87.2 FL (ref 80–100)
MCV RBC AUTO: 87.6 FL (ref 80–100)
MCV RBC AUTO: 88.3 FL (ref 80–100)
MCV RBC AUTO: 90.9 FL (ref 80–100)
MICROSCOPIC EXAMINATION: YES
MICROSCOPIC EXAMINATION: YES
MONOCYTES ABSOLUTE: 0 K/UL (ref 0–1.3)
MONOCYTES ABSOLUTE: 0.2 K/UL (ref 0–1.3)
MONOCYTES ABSOLUTE: 0.4 K/UL (ref 0–1.3)
MONOCYTES ABSOLUTE: 0.5 /ΜL
MONOCYTES ABSOLUTE: 0.6 /ΜL
MONOCYTES ABSOLUTE: 0.7 /ΜL
MONOCYTES ABSOLUTE: 0.7 K/UL (ref 0–1.3)
MONOCYTES ABSOLUTE: 0.7 K/UL (ref 0–1.3)
MONOCYTES ABSOLUTE: 0.8 /ΜL
MONOCYTES ABSOLUTE: 0.8 K/UL (ref 0–1.3)
MONOCYTES ABSOLUTE: 0.8 K/UL (ref 0–1.3)
MONOCYTES ABSOLUTE: 0.9 /ΜL
MONOCYTES ABSOLUTE: 1 /ΜL
MONOCYTES ABSOLUTE: 1.1 /ΜL
MONOCYTES ABSOLUTE: 1.1 K/UL (ref 0–1.3)
MONOCYTES ABSOLUTE: 1.2 K/UL (ref 0–1.3)
MONOCYTES RELATIVE PERCENT: 0 %
MONOCYTES RELATIVE PERCENT: 10.4 %
MONOCYTES RELATIVE PERCENT: 11.4 %
MONOCYTES RELATIVE PERCENT: 11.6 %
MONOCYTES RELATIVE PERCENT: 11.7 %
MONOCYTES RELATIVE PERCENT: 12.5 %
MONOCYTES RELATIVE PERCENT: 12.8 %
MONOCYTES RELATIVE PERCENT: 13.1 %
MONOCYTES RELATIVE PERCENT: 13.9 %
MONOCYTES RELATIVE PERCENT: 15.9 %
MONOCYTES RELATIVE PERCENT: 3 %
MONOCYTES RELATIVE PERCENT: 5.4 %
MONOCYTES RELATIVE PERCENT: 5.7 %
MONOCYTES RELATIVE PERCENT: 6.8 %
MONOCYTES RELATIVE PERCENT: 7.6 %
MONOCYTES RELATIVE PERCENT: 8 %
MONOCYTES RELATIVE PERCENT: 8.8 %
MONOCYTES RELATIVE PERCENT: 8.9 %
MUCUS: ABNORMAL /LPF
NEUTROPHILS ABSOLUTE: 1.9 /ΜL
NEUTROPHILS ABSOLUTE: 12.1 K/UL (ref 1.7–7.7)
NEUTROPHILS ABSOLUTE: 17.1 K/UL (ref 1.7–7.7)
NEUTROPHILS ABSOLUTE: 2.6 /ΜL
NEUTROPHILS ABSOLUTE: 2.6 /ΜL
NEUTROPHILS ABSOLUTE: 3.8 /ΜL
NEUTROPHILS ABSOLUTE: 3.8 /ΜL
NEUTROPHILS ABSOLUTE: 4 /ΜL
NEUTROPHILS ABSOLUTE: 4 K/UL (ref 1.7–7.7)
NEUTROPHILS ABSOLUTE: 4.2 /ΜL
NEUTROPHILS ABSOLUTE: 4.3 /ΜL
NEUTROPHILS ABSOLUTE: 4.6 K/UL (ref 1.7–7.7)
NEUTROPHILS ABSOLUTE: 4.8 /ΜL
NEUTROPHILS ABSOLUTE: 5.2 K/UL (ref 1.7–7.7)
NEUTROPHILS ABSOLUTE: 5.44 /ΜL
NEUTROPHILS ABSOLUTE: 5.6 /ΜL
NEUTROPHILS ABSOLUTE: 5.8 K/UL (ref 1.7–7.7)
NEUTROPHILS ABSOLUTE: 6.9 K/UL (ref 1.7–7.7)
NEUTROPHILS ABSOLUTE: 7.7 K/UL (ref 1.7–7.7)
NEUTROPHILS ABSOLUTE: 9.6 K/UL (ref 1.7–7.7)
NEUTROPHILS RELATIVE PERCENT: 46.3 %
NEUTROPHILS RELATIVE PERCENT: 50.5 %
NEUTROPHILS RELATIVE PERCENT: 51.5 %
NEUTROPHILS RELATIVE PERCENT: 58.1 %
NEUTROPHILS RELATIVE PERCENT: 62.8 %
NEUTROPHILS RELATIVE PERCENT: 63.2 %
NEUTROPHILS RELATIVE PERCENT: 63.4 %
NEUTROPHILS RELATIVE PERCENT: 63.7 %
NEUTROPHILS RELATIVE PERCENT: 65.9 %
NEUTROPHILS RELATIVE PERCENT: 68.4 %
NEUTROPHILS RELATIVE PERCENT: 70.6 %
NEUTROPHILS RELATIVE PERCENT: 71.1 %
NEUTROPHILS RELATIVE PERCENT: 72 %
NEUTROPHILS RELATIVE PERCENT: 77.8 %
NEUTROPHILS RELATIVE PERCENT: 78.3 %
NEUTROPHILS RELATIVE PERCENT: 78.9 %
NEUTROPHILS RELATIVE PERCENT: 84.7 %
NEUTROPHILS RELATIVE PERCENT: 85.4 %
NEUTROPHILS RELATIVE PERCENT: 87 %
NEUTROPHILS RELATIVE PERCENT: 87.6 %
NITRITE, URINE: NEGATIVE
NITRITE, URINE: NEGATIVE
OCCULT BLOOD DIAGNOSTIC: ABNORMAL
ORGANISM: ABNORMAL
ORGANISM: ABNORMAL
PARATHYROID HORMONE INTACT: 85 PG/ML (ref 14–72)
PDW BLD-RTO: 17.3 % (ref 12.4–15.4)
PDW BLD-RTO: 18.5 % (ref 12.4–15.4)
PDW BLD-RTO: 18.6 % (ref 12.4–15.4)
PDW BLD-RTO: 18.8 % (ref 12.4–15.4)
PDW BLD-RTO: 19 % (ref 12.4–15.4)
PDW BLD-RTO: 19.4 % (ref 12.4–15.4)
PDW BLD-RTO: 20.2 % (ref 12.4–15.4)
PDW BLD-RTO: 20.3 % (ref 12.4–15.4)
PDW BLD-RTO: 20.3 % (ref 12.4–15.4)
PERFORMED ON: ABNORMAL
PERFORMED ON: ABNORMAL
PH UA: 5 (ref 5–8)
PH UA: 7 (ref 5–8)
PHOSPHORUS: 2.3 MG/DL (ref 2.5–4.9)
PHOSPHORUS: 5.4 MG/DL (ref 2.5–4.9)
PLATELET # BLD: 112 K/UL (ref 135–450)
PLATELET # BLD: 129 K/UL (ref 135–450)
PLATELET # BLD: 130 K/UL (ref 135–450)
PLATELET # BLD: 132 K/UL (ref 135–450)
PLATELET # BLD: 143 K/UL (ref 135–450)
PLATELET # BLD: 145 K/UL (ref 135–450)
PLATELET # BLD: 146 K/UL (ref 135–450)
PLATELET # BLD: 53 K/ΜL
PLATELET # BLD: 62 K/ΜL
PLATELET # BLD: 67 K/ΜL
PLATELET # BLD: 87 K/ΜL
PLATELET # BLD: 95 K/ΜL
PLATELET # BLD: ABNORMAL 10*3/UL
PLATELET # BLD: ABNORMAL K/UL (ref 135–450)
PLATELET # BLD: ABNORMAL K/UL (ref 135–450)
PLATELET SLIDE REVIEW: ABNORMAL
PLATELET SLIDE REVIEW: ADEQUATE
PLATELET SLIDE REVIEW: ADEQUATE
PMV BLD AUTO: 10.1 FL
PMV BLD AUTO: 8.2 FL
PMV BLD AUTO: 8.9 FL (ref 5–10.5)
PMV BLD AUTO: 9 FL
PMV BLD AUTO: 9.1 FL
PMV BLD AUTO: 9.1 FL (ref 5–10.5)
PMV BLD AUTO: 9.1 FL (ref 5–10.5)
PMV BLD AUTO: 9.2 FL
PMV BLD AUTO: 9.4 FL
PMV BLD AUTO: 9.4 FL
PMV BLD AUTO: 9.5 FL
PMV BLD AUTO: 9.7 FL
PMV BLD AUTO: 9.8 FL
PMV BLD AUTO: 9.8 FL (ref 5–10.5)
PMV BLD AUTO: 9.9 FL
PMV BLD AUTO: 9.9 FL (ref 5–10.5)
PMV BLD AUTO: ABNORMAL FL (ref 5–10.5)
PMV BLD AUTO: ABNORMAL FL (ref 5–10.5)
POTASSIUM REFLEX MAGNESIUM: 3.3 MMOL/L (ref 3.5–5.1)
POTASSIUM REFLEX MAGNESIUM: 3.4 MMOL/L (ref 3.5–5.1)
POTASSIUM REFLEX MAGNESIUM: 3.5 MMOL/L (ref 3.5–5.1)
POTASSIUM REFLEX MAGNESIUM: 3.6 MMOL/L (ref 3.5–5.1)
POTASSIUM REFLEX MAGNESIUM: 3.9 MMOL/L (ref 3.5–5.1)
POTASSIUM REFLEX MAGNESIUM: 5 MMOL/L (ref 3.5–5.1)
POTASSIUM SERPL-SCNC: 3.3 MMOL/L
POTASSIUM SERPL-SCNC: 3.3 MMOL/L (ref 3.5–5.1)
POTASSIUM SERPL-SCNC: 3.6 MMOL/L
POTASSIUM SERPL-SCNC: 3.9 MMOL/L (ref 3.5–5.1)
POTASSIUM SERPL-SCNC: 4 MMOL/L
POTASSIUM SERPL-SCNC: 4 MMOL/L
POTASSIUM SERPL-SCNC: 4 MMOL/L (ref 3.5–5.1)
POTASSIUM SERPL-SCNC: 4.1 MMOL/L
POTASSIUM SERPL-SCNC: 4.2 MMOL/L
POTASSIUM SERPL-SCNC: 4.2 MMOL/L
POTASSIUM SERPL-SCNC: 4.2 MMOL/L (ref 3.5–5.1)
POTASSIUM SERPL-SCNC: 4.3 MMOL/L
POTASSIUM SERPL-SCNC: 4.3 MMOL/L
POTASSIUM SERPL-SCNC: 4.3 MMOL/L (ref 3.5–5.1)
POTASSIUM SERPL-SCNC: 4.5 MMOL/L (ref 3.5–5.1)
POTASSIUM SERPL-SCNC: 4.8 MMOL/L
PRO-BNP: 2454 PG/ML (ref 0–449)
PRO-BNP: 6565 PG/ML (ref 0–449)
PROTEIN UA: 100 MG/DL
PROTEIN UA: 100 MG/DL
PROTHROMBIN TIME: 103.5 SEC (ref 10–13.2)
PROTHROMBIN TIME: 13.8 SEC (ref 10–13.2)
PROTHROMBIN TIME: 14.1 SEC (ref 10–13.2)
PROTHROMBIN TIME: 14.3 SEC (ref 10–13.2)
PROTHROMBIN TIME: 15.9 SEC (ref 10–13.2)
PROTHROMBIN TIME: 16.2 SEC (ref 10–13.2)
PROTHROMBIN TIME: 18.1 SEC (ref 10–13.2)
PROTHROMBIN TIME: 22.3 SEC (ref 10–13.2)
PROTHROMBIN TIME: 23.4 SEC (ref 10–13.2)
PROTHROMBIN TIME: 23.5 SEC (ref 10–13.2)
PROTHROMBIN TIME: 24 SEC (ref 10–13.2)
PROTHROMBIN TIME: 24.9 SEC (ref 10–13.2)
PROTHROMBIN TIME: 25.1 SEC (ref 10–13.2)
PROTHROMBIN TIME: 25.7 SEC (ref 10–13.2)
RBC # BLD: 2.48 M/UL (ref 4.2–5.9)
RBC # BLD: 2.58 10^6/ΜL
RBC # BLD: 2.65 10^6/ΜL
RBC # BLD: 2.65 M/UL (ref 4.2–5.9)
RBC # BLD: 2.66 M/UL (ref 4.2–5.9)
RBC # BLD: 2.69 10^6/ΜL
RBC # BLD: 2.69 10^6/ΜL
RBC # BLD: 2.69 M/UL (ref 4.2–5.9)
RBC # BLD: 2.73 10^6/ΜL
RBC # BLD: 2.79 10^6/ΜL
RBC # BLD: 2.92 M/UL (ref 4.2–5.9)
RBC # BLD: 2.93 M/UL (ref 4.2–5.9)
RBC # BLD: 2.96 M/UL (ref 4.2–5.9)
RBC # BLD: 2.98 M/UL (ref 4.2–5.9)
RBC # BLD: 2.99 M/UL (ref 4.2–5.9)
RBC # BLD: 3.03 10^6/ΜL
RBC # BLD: 3.12 10^6/ΜL
RBC # BLD: 3.19 10^6/ΜL
RBC UA: ABNORMAL /HPF (ref 0–4)
RBC UA: ABNORMAL /HPF (ref 0–4)
REPORT: NORMAL
SARS-COV-2, NAA: NOT DETECTED
SARS-COV-2: NOT DETECTED
SEDIMENTATION RATE, ERYTHROCYTE: 111 MM/HR (ref 0–20)
SEDIMENTATION RATE, ERYTHROCYTE: 67
SEDIMENTATION RATE, ERYTHROCYTE: 80
SEDIMENTATION RATE, ERYTHROCYTE: 80
SEDIMENTATION RATE, ERYTHROCYTE: 86
SLIDE REVIEW: ABNORMAL
SODIUM BLD-SCNC: 133 MMOL/L (ref 136–145)
SODIUM BLD-SCNC: 133 MMOL/L (ref 136–145)
SODIUM BLD-SCNC: 134 MMOL/L (ref 136–145)
SODIUM BLD-SCNC: 135 MMOL/L (ref 136–145)
SODIUM BLD-SCNC: 136 MMOL/L (ref 136–145)
SODIUM BLD-SCNC: 137 MMOL/L
SODIUM BLD-SCNC: 137 MMOL/L
SODIUM BLD-SCNC: 137 MMOL/L (ref 136–145)
SODIUM BLD-SCNC: 138 MMOL/L
SODIUM BLD-SCNC: 138 MMOL/L (ref 136–145)
SODIUM BLD-SCNC: 140 MMOL/L
SODIUM BLD-SCNC: 140 MMOL/L (ref 136–145)
SODIUM BLD-SCNC: 140 MMOL/L (ref 136–145)
SODIUM BLD-SCNC: 141 MMOL/L
SODIUM BLD-SCNC: 142 MMOL/L
SODIUM BLD-SCNC: 143 MMOL/L
SODIUM BLD-SCNC: 143 MMOL/L (ref 136–145)
SODIUM BLD-SCNC: 144 MMOL/L (ref 136–145)
SODIUM BLD-SCNC: 145 MMOL/L
SPECIFIC GRAVITY UA: 1.01 (ref 1–1.03)
SPECIFIC GRAVITY UA: 1.02 (ref 1–1.03)
THIS TEST SENT TO: NORMAL
TOTAL IRON BINDING CAPACITY: 150 UG/DL (ref 260–445)
TOTAL PROTEIN: 5
TOTAL PROTEIN: 5.2
TOTAL PROTEIN: 5.4
TOTAL PROTEIN: 5.4
TOTAL PROTEIN: 5.6
TOTAL PROTEIN: 5.6 G/DL (ref 6.4–8.2)
TOTAL PROTEIN: 5.9 G/DL (ref 6.4–8.2)
TOTAL PROTEIN: 6 G/DL (ref 6.4–8.2)
TOTAL PROTEIN: 6.3 G/DL (ref 6.4–8.2)
TOTAL PROTEIN: 6.5
TOTAL PROTEIN: 6.7 G/DL (ref 6.4–8.2)
TOTAL PROTEIN: 6.8 G/DL (ref 6.4–8.2)
TOTAL PROTEIN: 7 G/DL (ref 6.4–8.2)
TOTAL PROTEIN: 7.5 G/DL (ref 6.4–8.2)
TROPONIN: 0.02 NG/ML
TROPONIN: 0.02 NG/ML
TROPONIN: 0.03 NG/ML
TROPONIN: 0.17 NG/ML
URIC ACID, SERUM: 13.4 MG/DL (ref 3.5–7.2)
URINE CULTURE, ROUTINE: ABNORMAL
URINE CULTURE, ROUTINE: ABNORMAL
URINE REFLEX TO CULTURE: YES
URINE REFLEX TO CULTURE: YES
URINE TYPE: ABNORMAL
URINE TYPE: ABNORMAL
UROBILINOGEN, URINE: 0.2 E.U./DL
UROBILINOGEN, URINE: 1 E.U./DL
VANCOMYCIN TROUGH: 13.4
VANCOMYCIN TROUGH: 14.3
VANCOMYCIN TROUGH: 16 UG/ML (ref 10–20)
VANCOMYCIN TROUGH: 18.4
VANCOMYCIN TROUGH: 20.4 UG/ML (ref 10–20)
VANCOMYCIN TROUGH: 22.2
VANCOMYCIN TROUGH: 23.6
VANCOMYCIN TROUGH: 23.8
VANCOMYCIN TROUGH: 27.6
VANCOMYCIN TROUGH: 30.7
VITAMIN D 25-HYDROXY: 7.6 NG/ML
WBC # BLD: 11.3 K/UL (ref 4–11)
WBC # BLD: 13.8 K/UL (ref 4–11)
WBC # BLD: 20 K/UL (ref 4–11)
WBC # BLD: 4.1 10^3/ML
WBC # BLD: 4.6 K/UL (ref 4–11)
WBC # BLD: 5.1 10^3/ML
WBC # BLD: 5.2 10^3/ML
WBC # BLD: 5.5 K/UL (ref 4–11)
WBC # BLD: 6 10^3/ML
WBC # BLD: 6.1 10^3/ML
WBC # BLD: 6.4 10^3/ML
WBC # BLD: 6.8 10^3/ML
WBC # BLD: 6.8 10^3/ML
WBC # BLD: 7.3 K/UL (ref 4–11)
WBC # BLD: 7.4 K/UL (ref 4–11)
WBC # BLD: 7.5 10^3/ML
WBC # BLD: 7.8 10^3/ML
WBC # BLD: 8 10^3/ML
WBC # BLD: 8.9 K/UL (ref 4–11)
WBC # BLD: 9.9 K/UL (ref 4–11)
WBC UA: ABNORMAL /HPF (ref 0–5)
WBC UA: ABNORMAL /HPF (ref 0–5)
WOUND/ABSCESS: NORMAL
YEAST: PRESENT /HPF

## 2020-01-01 PROCEDURE — P9017 PLASMA 1 DONOR FRZ W/IN 8 HR: HCPCS

## 2020-01-01 PROCEDURE — 83970 ASSAY OF PARATHORMONE: CPT

## 2020-01-01 PROCEDURE — 87040 BLOOD CULTURE FOR BACTERIA: CPT

## 2020-01-01 PROCEDURE — 87328 CRYPTOSPORIDIUM AG IA: CPT

## 2020-01-01 PROCEDURE — 87505 NFCT AGENT DETECTION GI: CPT

## 2020-01-01 PROCEDURE — 80048 BASIC METABOLIC PNL TOTAL CA: CPT

## 2020-01-01 PROCEDURE — 83540 ASSAY OF IRON: CPT

## 2020-01-01 PROCEDURE — 86923 COMPATIBILITY TEST ELECTRIC: CPT

## 2020-01-01 PROCEDURE — 83735 ASSAY OF MAGNESIUM: CPT

## 2020-01-01 PROCEDURE — 99024 POSTOP FOLLOW-UP VISIT: CPT | Performed by: INTERNAL MEDICINE

## 2020-01-01 PROCEDURE — 83690 ASSAY OF LIPASE: CPT

## 2020-01-01 PROCEDURE — 6370000000 HC RX 637 (ALT 250 FOR IP): Performed by: INTERNAL MEDICINE

## 2020-01-01 PROCEDURE — 83880 ASSAY OF NATRIURETIC PEPTIDE: CPT

## 2020-01-01 PROCEDURE — 97166 OT EVAL MOD COMPLEX 45 MIN: CPT

## 2020-01-01 PROCEDURE — 85610 PROTHROMBIN TIME: CPT

## 2020-01-01 PROCEDURE — 82306 VITAMIN D 25 HYDROXY: CPT

## 2020-01-01 PROCEDURE — 85730 THROMBOPLASTIN TIME PARTIAL: CPT

## 2020-01-01 PROCEDURE — 87186 SC STD MICRODIL/AGAR DIL: CPT

## 2020-01-01 PROCEDURE — 36415 COLL VENOUS BLD VENIPUNCTURE: CPT

## 2020-01-01 PROCEDURE — 6370000000 HC RX 637 (ALT 250 FOR IP): Performed by: PHYSICIAN ASSISTANT

## 2020-01-01 PROCEDURE — 97530 THERAPEUTIC ACTIVITIES: CPT

## 2020-01-01 PROCEDURE — 93005 ELECTROCARDIOGRAM TRACING: CPT | Performed by: PHYSICIAN ASSISTANT

## 2020-01-01 PROCEDURE — 6370000000 HC RX 637 (ALT 250 FOR IP): Performed by: EMERGENCY MEDICINE

## 2020-01-01 PROCEDURE — 85014 HEMATOCRIT: CPT

## 2020-01-01 PROCEDURE — 83605 ASSAY OF LACTIC ACID: CPT

## 2020-01-01 PROCEDURE — 99214 OFFICE O/P EST MOD 30 MIN: CPT | Performed by: NURSE PRACTITIONER

## 2020-01-01 PROCEDURE — 1200000000 HC SEMI PRIVATE

## 2020-01-01 PROCEDURE — 97110 THERAPEUTIC EXERCISES: CPT

## 2020-01-01 PROCEDURE — 93294 REM INTERROG EVL PM/LDLS PM: CPT | Performed by: INTERNAL MEDICINE

## 2020-01-01 PROCEDURE — 93010 ELECTROCARDIOGRAM REPORT: CPT | Performed by: INTERNAL MEDICINE

## 2020-01-01 PROCEDURE — 85025 COMPLETE CBC W/AUTO DIFF WBC: CPT

## 2020-01-01 PROCEDURE — 80202 ASSAY OF VANCOMYCIN: CPT

## 2020-01-01 PROCEDURE — 36430 TRANSFUSION BLD/BLD COMPNT: CPT

## 2020-01-01 PROCEDURE — C9113 INJ PANTOPRAZOLE SODIUM, VIA: HCPCS | Performed by: PHYSICIAN ASSISTANT

## 2020-01-01 PROCEDURE — 0S923ZX DRAINAGE OF LUMBAR VERTEBRAL DISC, PERCUTANEOUS APPROACH, DIAGNOSTIC: ICD-10-PCS | Performed by: RADIOLOGY

## 2020-01-01 PROCEDURE — 6360000002 HC RX W HCPCS: Performed by: PHYSICIAN ASSISTANT

## 2020-01-01 PROCEDURE — 71045 X-RAY EXAM CHEST 1 VIEW: CPT

## 2020-01-01 PROCEDURE — 85610 PROTHROMBIN TIME: CPT | Performed by: PHARMACIST

## 2020-01-01 PROCEDURE — 96365 THER/PROPH/DIAG IV INF INIT: CPT

## 2020-01-01 PROCEDURE — 2580000003 HC RX 258: Performed by: INTERNAL MEDICINE

## 2020-01-01 PROCEDURE — 80053 COMPREHEN METABOLIC PANEL: CPT

## 2020-01-01 PROCEDURE — 99285 EMERGENCY DEPT VISIT HI MDM: CPT

## 2020-01-01 PROCEDURE — 85018 HEMOGLOBIN: CPT

## 2020-01-01 PROCEDURE — 85652 RBC SED RATE AUTOMATED: CPT

## 2020-01-01 PROCEDURE — 6360000002 HC RX W HCPCS: Performed by: EMERGENCY MEDICINE

## 2020-01-01 PROCEDURE — 86901 BLOOD TYPING SEROLOGIC RH(D): CPT

## 2020-01-01 PROCEDURE — 93000 ELECTROCARDIOGRAM COMPLETE: CPT | Performed by: INTERNAL MEDICINE

## 2020-01-01 PROCEDURE — 99284 EMERGENCY DEPT VISIT MOD MDM: CPT

## 2020-01-01 PROCEDURE — 99212 OFFICE O/P EST SF 10 MIN: CPT

## 2020-01-01 PROCEDURE — 77012 CT SCAN FOR NEEDLE BIOPSY: CPT

## 2020-01-01 PROCEDURE — 87449 NOS EACH ORGANISM AG IA: CPT

## 2020-01-01 PROCEDURE — 6360000004 HC RX CONTRAST MEDICATION: Performed by: EMERGENCY MEDICINE

## 2020-01-01 PROCEDURE — 0DJ08ZZ INSPECTION OF UPPER INTESTINAL TRACT, VIA NATURAL OR ARTIFICIAL OPENING ENDOSCOPIC: ICD-10-PCS | Performed by: INTERNAL MEDICINE

## 2020-01-01 PROCEDURE — 93296 REM INTERROG EVL PM/IDS: CPT | Performed by: INTERNAL MEDICINE

## 2020-01-01 PROCEDURE — 87075 CULTR BACTERIA EXCEPT BLOOD: CPT

## 2020-01-01 PROCEDURE — 80069 RENAL FUNCTION PANEL: CPT

## 2020-01-01 PROCEDURE — 73140 X-RAY EXAM OF FINGER(S): CPT

## 2020-01-01 PROCEDURE — 6360000002 HC RX W HCPCS: Performed by: INTERNAL MEDICINE

## 2020-01-01 PROCEDURE — C9132 KCENTRA, PER I.U.: HCPCS | Performed by: EMERGENCY MEDICINE

## 2020-01-01 PROCEDURE — 02HV33Z INSERTION OF INFUSION DEVICE INTO SUPERIOR VENA CAVA, PERCUTANEOUS APPROACH: ICD-10-PCS | Performed by: RADIOLOGY

## 2020-01-01 PROCEDURE — 74177 CT ABD & PELVIS W/CONTRAST: CPT

## 2020-01-01 PROCEDURE — 2580000003 HC RX 258: Performed by: EMERGENCY MEDICINE

## 2020-01-01 PROCEDURE — 99239 HOSP IP/OBS DSCHRG MGMT >30: CPT | Performed by: INTERNAL MEDICINE

## 2020-01-01 PROCEDURE — 99153 MOD SED SAME PHYS/QHP EA: CPT

## 2020-01-01 PROCEDURE — 84484 ASSAY OF TROPONIN QUANT: CPT

## 2020-01-01 PROCEDURE — 2709999900 HC NON-CHARGEABLE SUPPLY: Performed by: INTERNAL MEDICINE

## 2020-01-01 PROCEDURE — 30283B1 TRANSFUSION OF NONAUTOLOGOUS 4-FACTOR PROTHROMBIN COMPLEX CONCENTRATE INTO VEIN, PERCUTANEOUS APPROACH: ICD-10-PCS | Performed by: EMERGENCY MEDICINE

## 2020-01-01 PROCEDURE — 96367 TX/PROPH/DG ADDL SEQ IV INF: CPT

## 2020-01-01 PROCEDURE — G0328 FECAL BLOOD SCRN IMMUNOASSAY: HCPCS

## 2020-01-01 PROCEDURE — 93005 ELECTROCARDIOGRAM TRACING: CPT | Performed by: EMERGENCY MEDICINE

## 2020-01-01 PROCEDURE — 2500000003 HC RX 250 WO HCPCS: Performed by: INTERNAL MEDICINE

## 2020-01-01 PROCEDURE — C1769 GUIDE WIRE: HCPCS

## 2020-01-01 PROCEDURE — 99233 SBSQ HOSP IP/OBS HIGH 50: CPT | Performed by: INTERNAL MEDICINE

## 2020-01-01 PROCEDURE — 87205 SMEAR GRAM STAIN: CPT

## 2020-01-01 PROCEDURE — 87077 CULTURE AEROBIC IDENTIFY: CPT

## 2020-01-01 PROCEDURE — 86850 RBC ANTIBODY SCREEN: CPT

## 2020-01-01 PROCEDURE — 81001 URINALYSIS AUTO W/SCOPE: CPT

## 2020-01-01 PROCEDURE — 36573 INSJ PICC RS&I 5 YR+: CPT

## 2020-01-01 PROCEDURE — U0003 INFECTIOUS AGENT DETECTION BY NUCLEIC ACID (DNA OR RNA); SEVERE ACUTE RESPIRATORY SYNDROME CORONAVIRUS 2 (SARS-COV-2) (CORONAVIRUS DISEASE [COVID-19]), AMPLIFIED PROBE TECHNIQUE, MAKING USE OF HIGH THROUGHPUT TECHNOLOGIES AS DESCRIBED BY CMS-2020-01-R: HCPCS

## 2020-01-01 PROCEDURE — 96374 THER/PROPH/DIAG INJ IV PUSH: CPT

## 2020-01-01 PROCEDURE — 84550 ASSAY OF BLOOD/URIC ACID: CPT

## 2020-01-01 PROCEDURE — 93280 PM DEVICE PROGR EVAL DUAL: CPT | Performed by: INTERNAL MEDICINE

## 2020-01-01 PROCEDURE — 87324 CLOSTRIDIUM AG IA: CPT

## 2020-01-01 PROCEDURE — 86900 BLOOD TYPING SEROLOGIC ABO: CPT

## 2020-01-01 PROCEDURE — 93228 REMOTE 30 DAY ECG REV/REPORT: CPT | Performed by: INTERNAL MEDICINE

## 2020-01-01 PROCEDURE — 3700000001 HC ADD 15 MINUTES (ANESTHESIA): Performed by: INTERNAL MEDICINE

## 2020-01-01 PROCEDURE — 2580000003 HC RX 258: Performed by: PHYSICIAN ASSISTANT

## 2020-01-01 PROCEDURE — 3609017100 HC EGD: Performed by: INTERNAL MEDICINE

## 2020-01-01 PROCEDURE — 0SB23ZX EXCISION OF LUMBAR VERTEBRAL DISC, PERCUTANEOUS APPROACH, DIAGNOSTIC: ICD-10-PCS | Performed by: RADIOLOGY

## 2020-01-01 PROCEDURE — 87336 ENTAMOEB HIST DISPR AG IA: CPT

## 2020-01-01 PROCEDURE — 6360000002 HC RX W HCPCS: Performed by: NURSE ANESTHETIST, CERTIFIED REGISTERED

## 2020-01-01 PROCEDURE — 7100000011 HC PHASE II RECOVERY - ADDTL 15 MIN: Performed by: INTERNAL MEDICINE

## 2020-01-01 PROCEDURE — 99232 SBSQ HOSP IP/OBS MODERATE 35: CPT | Performed by: INTERNAL MEDICINE

## 2020-01-01 PROCEDURE — 96375 TX/PRO/DX INJ NEW DRUG ADDON: CPT

## 2020-01-01 PROCEDURE — 99152 MOD SED SAME PHYS/QHP 5/>YRS: CPT

## 2020-01-01 PROCEDURE — 87086 URINE CULTURE/COLONY COUNT: CPT

## 2020-01-01 PROCEDURE — 7100000010 HC PHASE II RECOVERY - FIRST 15 MIN: Performed by: INTERNAL MEDICINE

## 2020-01-01 PROCEDURE — 99211 OFF/OP EST MAY X REQ PHY/QHP: CPT | Performed by: PHARMACIST

## 2020-01-01 PROCEDURE — 3700000000 HC ANESTHESIA ATTENDED CARE: Performed by: INTERNAL MEDICINE

## 2020-01-01 PROCEDURE — 97162 PT EVAL MOD COMPLEX 30 MIN: CPT

## 2020-01-01 PROCEDURE — P9016 RBC LEUKOCYTES REDUCED: HCPCS

## 2020-01-01 PROCEDURE — 20225 BONE BIOPSY TROCAR/NDL DEEP: CPT

## 2020-01-01 PROCEDURE — 6360000002 HC RX W HCPCS: Performed by: RADIOLOGY

## 2020-01-01 PROCEDURE — 99442 PR PHYS/QHP TELEPHONE EVALUATION 11-20 MIN: CPT | Performed by: INTERNAL MEDICINE

## 2020-01-01 PROCEDURE — 99212 OFFICE O/P EST SF 10 MIN: CPT | Performed by: NURSE PRACTITIONER

## 2020-01-01 PROCEDURE — 83550 IRON BINDING TEST: CPT

## 2020-01-01 PROCEDURE — 99223 1ST HOSP IP/OBS HIGH 75: CPT | Performed by: INTERNAL MEDICINE

## 2020-01-01 PROCEDURE — 72100 X-RAY EXAM L-S SPINE 2/3 VWS: CPT

## 2020-01-01 PROCEDURE — 82728 ASSAY OF FERRITIN: CPT

## 2020-01-01 PROCEDURE — 87070 CULTURE OTHR SPECIMN AEROBIC: CPT

## 2020-01-01 RX ORDER — ACETAMINOPHEN 325 MG/1
650 TABLET ORAL ONCE
Status: COMPLETED | OUTPATIENT
Start: 2020-01-01 | End: 2020-01-01

## 2020-01-01 RX ORDER — ONDANSETRON 2 MG/ML
4 INJECTION INTRAMUSCULAR; INTRAVENOUS ONCE
Status: COMPLETED | OUTPATIENT
Start: 2020-01-01 | End: 2020-01-01

## 2020-01-01 RX ORDER — METOPROLOL SUCCINATE 50 MG/1
50 TABLET, EXTENDED RELEASE ORAL DAILY
Status: DISCONTINUED | OUTPATIENT
Start: 2020-01-01 | End: 2020-01-01 | Stop reason: HOSPADM

## 2020-01-01 RX ORDER — MINERAL OIL 100 G/100G
1 OIL RECTAL ONCE
Status: COMPLETED | OUTPATIENT
Start: 2020-01-01 | End: 2020-01-01

## 2020-01-01 RX ORDER — PANTOPRAZOLE SODIUM 40 MG/10ML
40 INJECTION, POWDER, LYOPHILIZED, FOR SOLUTION INTRAVENOUS ONCE
Status: COMPLETED | OUTPATIENT
Start: 2020-01-01 | End: 2020-01-01

## 2020-01-01 RX ORDER — 0.9 % SODIUM CHLORIDE 0.9 %
30 INTRAVENOUS SOLUTION INTRAVENOUS ONCE
Status: COMPLETED | OUTPATIENT
Start: 2020-01-01 | End: 2020-01-01

## 2020-01-01 RX ORDER — SODIUM CHLORIDE 9 MG/ML
50 INJECTION, SOLUTION INTRAVENOUS ONCE
Status: COMPLETED | OUTPATIENT
Start: 2020-01-01 | End: 2020-01-01

## 2020-01-01 RX ORDER — SODIUM CHLORIDE 9 MG/ML
INJECTION, SOLUTION INTRAVENOUS CONTINUOUS
Status: DISPENSED | OUTPATIENT
Start: 2020-01-01 | End: 2020-01-01

## 2020-01-01 RX ORDER — SODIUM CHLORIDE 9 MG/ML
1000 INJECTION, SOLUTION INTRAVENOUS CONTINUOUS
Status: DISCONTINUED | OUTPATIENT
Start: 2020-01-01 | End: 2020-01-01 | Stop reason: SDUPTHER

## 2020-01-01 RX ORDER — FUROSEMIDE 10 MG/ML
40 INJECTION INTRAMUSCULAR; INTRAVENOUS ONCE
Status: DISCONTINUED | OUTPATIENT
Start: 2020-01-01 | End: 2020-01-01

## 2020-01-01 RX ORDER — CITALOPRAM 20 MG/1
15 TABLET ORAL DAILY
Status: DISCONTINUED | OUTPATIENT
Start: 2020-01-01 | End: 2020-01-01 | Stop reason: HOSPADM

## 2020-01-01 RX ORDER — SODIUM BICARBONATE 650 MG/1
650 TABLET ORAL 3 TIMES DAILY
Status: DISCONTINUED | OUTPATIENT
Start: 2020-01-01 | End: 2020-01-01 | Stop reason: HOSPADM

## 2020-01-01 RX ORDER — HYDROCODONE BITARTRATE AND ACETAMINOPHEN 5; 325 MG/1; MG/1
2 TABLET ORAL ONCE
Status: COMPLETED | OUTPATIENT
Start: 2020-01-01 | End: 2020-01-01

## 2020-01-01 RX ORDER — FENTANYL CITRATE 50 UG/ML
INJECTION, SOLUTION INTRAMUSCULAR; INTRAVENOUS
Status: COMPLETED | OUTPATIENT
Start: 2020-01-01 | End: 2020-01-01

## 2020-01-01 RX ORDER — 0.9 % SODIUM CHLORIDE 0.9 %
20 INTRAVENOUS SOLUTION INTRAVENOUS ONCE
Status: COMPLETED | OUTPATIENT
Start: 2020-01-01 | End: 2020-01-01

## 2020-01-01 RX ORDER — SODIUM CHLORIDE 0.9 % (FLUSH) 0.9 %
10 SYRINGE (ML) INJECTION PRN
Status: DISCONTINUED | OUTPATIENT
Start: 2020-01-01 | End: 2020-01-01 | Stop reason: HOSPADM

## 2020-01-01 RX ORDER — BISACODYL 10 MG
10 SUPPOSITORY, RECTAL RECTAL DAILY PRN
Status: DISCONTINUED | OUTPATIENT
Start: 2020-01-01 | End: 2020-01-01 | Stop reason: HOSPADM

## 2020-01-01 RX ORDER — DOCUSATE SODIUM 100 MG/1
100 CAPSULE, LIQUID FILLED ORAL DAILY
Status: DISCONTINUED | OUTPATIENT
Start: 2020-01-01 | End: 2020-01-01 | Stop reason: HOSPADM

## 2020-01-01 RX ORDER — SODIUM CHLORIDE 0.9 % (FLUSH) 0.9 %
10 SYRINGE (ML) INJECTION EVERY 12 HOURS SCHEDULED
Status: DISCONTINUED | OUTPATIENT
Start: 2020-01-01 | End: 2020-01-01 | Stop reason: HOSPADM

## 2020-01-01 RX ORDER — SODIUM CHLORIDE 9 MG/ML
INJECTION, SOLUTION INTRAVENOUS CONTINUOUS
Status: DISCONTINUED | OUTPATIENT
Start: 2020-01-01 | End: 2020-01-01 | Stop reason: HOSPADM

## 2020-01-01 RX ORDER — ALLOPURINOL 100 MG/1
100 TABLET ORAL 2 TIMES DAILY
COMMUNITY
Start: 2020-01-01

## 2020-01-01 RX ORDER — MORPHINE SULFATE 4 MG/ML
4 INJECTION, SOLUTION INTRAMUSCULAR; INTRAVENOUS EVERY 30 MIN PRN
Status: DISCONTINUED | OUTPATIENT
Start: 2020-01-01 | End: 2020-01-01 | Stop reason: HOSPADM

## 2020-01-01 RX ORDER — ONDANSETRON 2 MG/ML
4 INJECTION INTRAMUSCULAR; INTRAVENOUS EVERY 6 HOURS PRN
Status: DISCONTINUED | OUTPATIENT
Start: 2020-01-01 | End: 2020-01-01 | Stop reason: HOSPADM

## 2020-01-01 RX ORDER — MIDAZOLAM HYDROCHLORIDE 5 MG/ML
INJECTION INTRAMUSCULAR; INTRAVENOUS
Status: COMPLETED | OUTPATIENT
Start: 2020-01-01 | End: 2020-01-01

## 2020-01-01 RX ORDER — SODIUM CHLORIDE 9 MG/ML
INJECTION, SOLUTION INTRAVENOUS CONTINUOUS
Status: DISCONTINUED | OUTPATIENT
Start: 2020-01-01 | End: 2020-01-01

## 2020-01-01 RX ORDER — POTASSIUM CHLORIDE 750 MG/1
10 TABLET, EXTENDED RELEASE ORAL DAILY
Qty: 30 TABLET | Refills: 0 | Status: SHIPPED | OUTPATIENT
Start: 2020-01-01

## 2020-01-01 RX ORDER — SODIUM CHLORIDE 9 MG/ML
INJECTION, SOLUTION INTRAVENOUS ONCE
Status: COMPLETED | OUTPATIENT
Start: 2020-01-01 | End: 2020-01-01

## 2020-01-01 RX ORDER — WARFARIN SODIUM 2 MG/1
2 TABLET ORAL
Status: ACTIVE | OUTPATIENT
Start: 2020-01-01 | End: 2020-01-01

## 2020-01-01 RX ORDER — ATORVASTATIN CALCIUM 40 MG/1
40 TABLET, FILM COATED ORAL DAILY
Status: DISCONTINUED | OUTPATIENT
Start: 2020-01-01 | End: 2020-01-01

## 2020-01-01 RX ORDER — CITALOPRAM HYDROBROMIDE 20 MG/10ML
15 SOLUTION, ORAL ORAL DAILY
COMMUNITY

## 2020-01-01 RX ORDER — POLYETHYLENE GLYCOL 3350 17 G/17G
17 POWDER, FOR SOLUTION ORAL 2 TIMES DAILY
Status: DISCONTINUED | OUTPATIENT
Start: 2020-01-01 | End: 2020-01-01

## 2020-01-01 RX ORDER — POLYETHYLENE GLYCOL 3350 17 G/17G
17 POWDER, FOR SOLUTION ORAL 2 TIMES DAILY
Status: DISCONTINUED | OUTPATIENT
Start: 2020-01-01 | End: 2020-01-01 | Stop reason: HOSPADM

## 2020-01-01 RX ORDER — HYDROCODONE BITARTRATE AND ACETAMINOPHEN 5; 325 MG/1; MG/1
1 TABLET ORAL EVERY 6 HOURS PRN
Qty: 20 TABLET | Refills: 0 | Status: SHIPPED | OUTPATIENT
Start: 2020-01-01 | End: 2020-01-01

## 2020-01-01 RX ORDER — DEXTROSE MONOHYDRATE 50 MG/ML
100 INJECTION, SOLUTION INTRAVENOUS PRN
Status: DISCONTINUED | OUTPATIENT
Start: 2020-01-01 | End: 2020-01-01 | Stop reason: HOSPADM

## 2020-01-01 RX ORDER — ACETAMINOPHEN 650 MG/1
650 SUPPOSITORY RECTAL EVERY 6 HOURS PRN
Status: DISCONTINUED | OUTPATIENT
Start: 2020-01-01 | End: 2020-01-01 | Stop reason: HOSPADM

## 2020-01-01 RX ORDER — FUROSEMIDE 40 MG/1
20 TABLET ORAL 2 TIMES DAILY
COMMUNITY
Start: 2020-01-01

## 2020-01-01 RX ORDER — PANTOPRAZOLE SODIUM 40 MG/10ML
40 INJECTION, POWDER, LYOPHILIZED, FOR SOLUTION INTRAVENOUS DAILY
Status: DISCONTINUED | OUTPATIENT
Start: 2020-01-01 | End: 2020-01-01 | Stop reason: HOSPADM

## 2020-01-01 RX ORDER — SENNA PLUS 8.6 MG/1
1 TABLET ORAL NIGHTLY
Status: DISCONTINUED | OUTPATIENT
Start: 2020-01-01 | End: 2020-01-01

## 2020-01-01 RX ORDER — CLINDAMYCIN HYDROCHLORIDE 150 MG/1
450 CAPSULE ORAL 3 TIMES DAILY
Qty: 90 CAPSULE | Refills: 0 | Status: SHIPPED | OUTPATIENT
Start: 2020-01-01 | End: 2020-01-01

## 2020-01-01 RX ORDER — DEXTROSE MONOHYDRATE 25 G/50ML
12.5 INJECTION, SOLUTION INTRAVENOUS PRN
Status: DISCONTINUED | OUTPATIENT
Start: 2020-01-01 | End: 2020-01-01 | Stop reason: HOSPADM

## 2020-01-01 RX ORDER — SODIUM CHLORIDE 0.9 % (FLUSH) 0.9 %
10 SYRINGE (ML) INJECTION PRN
Status: DISCONTINUED | OUTPATIENT
Start: 2020-01-01 | End: 2020-01-01

## 2020-01-01 RX ORDER — SIMVASTATIN 80 MG
80 TABLET ORAL NIGHTLY
COMMUNITY

## 2020-01-01 RX ORDER — POTASSIUM CHLORIDE 7.45 MG/ML
10 INJECTION INTRAVENOUS ONCE
Status: COMPLETED | OUTPATIENT
Start: 2020-01-01 | End: 2020-01-01

## 2020-01-01 RX ORDER — HYDROCODONE BITARTRATE AND ACETAMINOPHEN 5; 325 MG/1; MG/1
1 TABLET ORAL EVERY 4 HOURS PRN
Qty: 18 TABLET | Refills: 0 | Status: SHIPPED | OUTPATIENT
Start: 2020-01-01 | End: 2020-01-01

## 2020-01-01 RX ORDER — 0.9 % SODIUM CHLORIDE 0.9 %
1000 INTRAVENOUS SOLUTION INTRAVENOUS ONCE
Status: COMPLETED | OUTPATIENT
Start: 2020-01-01 | End: 2020-01-01

## 2020-01-01 RX ORDER — WARFARIN SODIUM 2 MG/1
2 TABLET ORAL DAILY
Qty: 90 TABLET | Refills: 3 | Status: ON HOLD | OUTPATIENT
Start: 2020-01-01 | End: 2020-01-01 | Stop reason: HOSPADM

## 2020-01-01 RX ORDER — ACETAMINOPHEN 325 MG/1
650 TABLET ORAL EVERY 6 HOURS PRN
Status: DISCONTINUED | OUTPATIENT
Start: 2020-01-01 | End: 2020-01-01 | Stop reason: HOSPADM

## 2020-01-01 RX ORDER — PROPOFOL 10 MG/ML
INJECTION, EMULSION INTRAVENOUS PRN
Status: DISCONTINUED | OUTPATIENT
Start: 2020-01-01 | End: 2020-01-01 | Stop reason: SDUPTHER

## 2020-01-01 RX ORDER — FUROSEMIDE 10 MG/ML
40 INJECTION INTRAMUSCULAR; INTRAVENOUS ONCE
Status: COMPLETED | OUTPATIENT
Start: 2020-01-01 | End: 2020-01-01

## 2020-01-01 RX ORDER — MIDODRINE HYDROCHLORIDE 5 MG/1
10 TABLET ORAL
Status: DISCONTINUED | OUTPATIENT
Start: 2020-01-01 | End: 2020-01-01 | Stop reason: HOSPADM

## 2020-01-01 RX ORDER — WARFARIN SODIUM 2 MG/1
2 TABLET ORAL
Status: COMPLETED | OUTPATIENT
Start: 2020-01-01 | End: 2020-01-01

## 2020-01-01 RX ORDER — PROMETHAZINE HYDROCHLORIDE 25 MG/1
12.5 TABLET ORAL EVERY 6 HOURS PRN
Status: DISCONTINUED | OUTPATIENT
Start: 2020-01-01 | End: 2020-01-01 | Stop reason: HOSPADM

## 2020-01-01 RX ORDER — SIMVASTATIN 20 MG
20 TABLET ORAL DAILY
COMMUNITY
Start: 2013-01-04

## 2020-01-01 RX ORDER — ALLOPURINOL 100 MG/1
100 TABLET ORAL 2 TIMES DAILY
Status: DISCONTINUED | OUTPATIENT
Start: 2020-01-01 | End: 2020-01-01

## 2020-01-01 RX ORDER — OMEPRAZOLE 20 MG/1
20 CAPSULE, DELAYED RELEASE ORAL DAILY
COMMUNITY

## 2020-01-01 RX ORDER — NICOTINE POLACRILEX 4 MG
15 LOZENGE BUCCAL PRN
Status: DISCONTINUED | OUTPATIENT
Start: 2020-01-01 | End: 2020-01-01 | Stop reason: HOSPADM

## 2020-01-01 RX ORDER — ACETAMINOPHEN 325 MG/1
650 TABLET ORAL EVERY 6 HOURS PRN
COMMUNITY

## 2020-01-01 RX ORDER — POTASSIUM CHLORIDE 7.45 MG/ML
10 INJECTION INTRAVENOUS
Status: DISPENSED | OUTPATIENT
Start: 2020-01-01 | End: 2020-01-01

## 2020-01-01 RX ORDER — METOPROLOL SUCCINATE 50 MG/1
TABLET, EXTENDED RELEASE ORAL
Qty: 90 TABLET | Refills: 2 | Status: ON HOLD | OUTPATIENT
Start: 2020-01-01 | End: 2020-01-01

## 2020-01-01 RX ORDER — 0.9 % SODIUM CHLORIDE 0.9 %
500 INTRAVENOUS SOLUTION INTRAVENOUS ONCE
Status: DISCONTINUED | OUTPATIENT
Start: 2020-01-01 | End: 2020-01-01

## 2020-01-01 RX ORDER — ATORVASTATIN CALCIUM 40 MG/1
80 TABLET, FILM COATED ORAL NIGHTLY
Status: DISCONTINUED | OUTPATIENT
Start: 2020-01-01 | End: 2020-01-01

## 2020-01-01 RX ORDER — SODIUM CHLORIDE 0.9 % (FLUSH) 0.9 %
10 SYRINGE (ML) INJECTION EVERY 12 HOURS SCHEDULED
Status: DISCONTINUED | OUTPATIENT
Start: 2020-01-01 | End: 2020-01-01

## 2020-01-01 RX ORDER — SODIUM CHLORIDE 9 MG/ML
1000 INJECTION, SOLUTION INTRAVENOUS CONTINUOUS
Status: DISCONTINUED | OUTPATIENT
Start: 2020-01-01 | End: 2020-01-01

## 2020-01-01 RX ORDER — HYDROCODONE BITARTRATE AND ACETAMINOPHEN 5; 325 MG/1; MG/1
1 TABLET ORAL EVERY 6 HOURS PRN
COMMUNITY

## 2020-01-01 RX ORDER — LOSARTAN POTASSIUM 50 MG/1
50 TABLET ORAL DAILY
Status: ON HOLD | COMMUNITY
End: 2020-01-01 | Stop reason: ALTCHOICE

## 2020-01-01 RX ORDER — SENNA PLUS 8.6 MG/1
1 TABLET ORAL NIGHTLY PRN
Status: DISCONTINUED | OUTPATIENT
Start: 2020-01-01 | End: 2020-01-01 | Stop reason: HOSPADM

## 2020-01-01 RX ORDER — LIDOCAINE HYDROCHLORIDE 10 MG/ML
5 INJECTION, SOLUTION INFILTRATION; PERINEURAL ONCE
Status: DISCONTINUED | OUTPATIENT
Start: 2020-01-01 | End: 2020-01-01 | Stop reason: HOSPADM

## 2020-01-01 RX ORDER — 0.9 % SODIUM CHLORIDE 0.9 %
1000 INTRAVENOUS SOLUTION INTRAVENOUS ONCE
Status: DISCONTINUED | OUTPATIENT
Start: 2020-01-01 | End: 2020-01-01 | Stop reason: HOSPADM

## 2020-01-01 RX ADMIN — SODIUM CHLORIDE 1000 ML: 9 INJECTION, SOLUTION INTRAVENOUS at 14:20

## 2020-01-01 RX ADMIN — SODIUM CHLORIDE 1000 ML: 9 INJECTION, SOLUTION INTRAVENOUS at 17:58

## 2020-01-01 RX ADMIN — Medication 10 ML: at 22:10

## 2020-01-01 RX ADMIN — DEXTROSE MONOHYDRATE 1.5 G: 50 INJECTION, SOLUTION INTRAVENOUS at 15:40

## 2020-01-01 RX ADMIN — MINERAL OIL 1 ENEMA: 100 ENEMA RECTAL at 04:26

## 2020-01-01 RX ADMIN — POLYETHYLENE GLYCOL (3350) 17 G: 17 POWDER, FOR SOLUTION ORAL at 16:57

## 2020-01-01 RX ADMIN — POLYETHYLENE GLYCOL (3350) 17 G: 17 POWDER, FOR SOLUTION ORAL at 17:44

## 2020-01-01 RX ADMIN — SODIUM CHLORIDE: 9 INJECTION, SOLUTION INTRAVENOUS at 21:04

## 2020-01-01 RX ADMIN — Medication 250 MG: at 23:53

## 2020-01-01 RX ADMIN — WARFARIN SODIUM 2 MG: 2 TABLET ORAL at 17:01

## 2020-01-01 RX ADMIN — MIDAZOLAM HYDROCHLORIDE 1 MG: 5 INJECTION INTRAMUSCULAR; INTRAVENOUS at 10:25

## 2020-01-01 RX ADMIN — METOPROLOL SUCCINATE 50 MG: 50 TABLET, EXTENDED RELEASE ORAL at 08:33

## 2020-01-01 RX ADMIN — ACETAMINOPHEN 650 MG: 325 TABLET ORAL at 21:08

## 2020-01-01 RX ADMIN — SODIUM CHLORIDE 1000 ML: 9 INJECTION, SOLUTION INTRAVENOUS at 01:07

## 2020-01-01 RX ADMIN — PIPERACILLIN SODIUM AND TAZOBACTAM SODIUM 3.38 G: 3; .375 INJECTION, POWDER, LYOPHILIZED, FOR SOLUTION INTRAVENOUS at 16:56

## 2020-01-01 RX ADMIN — ALLOPURINOL 100 MG: 100 TABLET ORAL at 08:15

## 2020-01-01 RX ADMIN — SODIUM CHLORIDE: 9 INJECTION, SOLUTION INTRAVENOUS at 02:21

## 2020-01-01 RX ADMIN — SODIUM CHLORIDE 8 MG/HR: 9 INJECTION, SOLUTION INTRAVENOUS at 01:19

## 2020-01-01 RX ADMIN — PANTOPRAZOLE SODIUM 40 MG: 40 INJECTION, POWDER, FOR SOLUTION INTRAVENOUS at 11:30

## 2020-01-01 RX ADMIN — SODIUM CHLORIDE: 9 INJECTION, SOLUTION INTRAVENOUS at 08:13

## 2020-01-01 RX ADMIN — ACETAMINOPHEN 650 MG: 325 TABLET ORAL at 08:19

## 2020-01-01 RX ADMIN — SODIUM BICARBONATE 650 MG: 650 TABLET ORAL at 22:09

## 2020-01-01 RX ADMIN — PROTHROMBIN, COAGULATION FACTOR VII HUMAN, COAGULATION FACTOR IX HUMAN, COAGULATION FACTOR X HUMAN, PROTEIN C, PROTEIN S HUMAN, AND WATER 5319 UNITS: KIT at 17:58

## 2020-01-01 RX ADMIN — POTASSIUM CHLORIDE 10 MEQ: 7.46 INJECTION, SOLUTION INTRAVENOUS at 14:17

## 2020-01-01 RX ADMIN — PIPERACILLIN SODIUM AND TAZOBACTAM SODIUM 3.38 G: 3; .375 INJECTION, POWDER, LYOPHILIZED, FOR SOLUTION INTRAVENOUS at 05:30

## 2020-01-01 RX ADMIN — FENTANYL CITRATE 50 MCG: 50 INJECTION INTRAMUSCULAR; INTRAVENOUS at 10:48

## 2020-01-01 RX ADMIN — DEXTROSE MONOHYDRATE 1.5 G: 50 INJECTION, SOLUTION INTRAVENOUS at 23:30

## 2020-01-01 RX ADMIN — SODIUM BICARBONATE 650 MG: 650 TABLET ORAL at 16:56

## 2020-01-01 RX ADMIN — SODIUM CHLORIDE 20 ML: 9 INJECTION, SOLUTION INTRAVENOUS at 09:00

## 2020-01-01 RX ADMIN — CEFTRIAXONE SODIUM 1 G: 1 INJECTION, POWDER, FOR SOLUTION INTRAMUSCULAR; INTRAVENOUS at 14:34

## 2020-01-01 RX ADMIN — Medication 250 MG: at 14:17

## 2020-01-01 RX ADMIN — PANTOPRAZOLE SODIUM 40 MG: 40 INJECTION, POWDER, FOR SOLUTION INTRAVENOUS at 15:23

## 2020-01-01 RX ADMIN — METOPROLOL TARTRATE 25 MG: 25 TABLET, FILM COATED ORAL at 22:10

## 2020-01-01 RX ADMIN — DOCUSATE SODIUM 100 MG: 100 CAPSULE, LIQUID FILLED ORAL at 14:34

## 2020-01-01 RX ADMIN — SODIUM BICARBONATE: 84 INJECTION, SOLUTION INTRAVENOUS at 12:30

## 2020-01-01 RX ADMIN — DOCUSATE SODIUM 100 MG: 100 CAPSULE, LIQUID FILLED ORAL at 16:56

## 2020-01-01 RX ADMIN — PIPERACILLIN SODIUM AND TAZOBACTAM SODIUM 3.38 G: 3; .375 INJECTION, POWDER, LYOPHILIZED, FOR SOLUTION INTRAVENOUS at 05:35

## 2020-01-01 RX ADMIN — SODIUM CHLORIDE: 9 INJECTION, SOLUTION INTRAVENOUS at 11:44

## 2020-01-01 RX ADMIN — TICAGRELOR 90 MG: 90 TABLET ORAL at 10:29

## 2020-01-01 RX ADMIN — VANCOMYCIN HYDROCHLORIDE 2000 MG: 1 INJECTION, POWDER, LYOPHILIZED, FOR SOLUTION INTRAVENOUS at 00:19

## 2020-01-01 RX ADMIN — SODIUM BICARBONATE 650 MG: 650 TABLET ORAL at 14:20

## 2020-01-01 RX ADMIN — PANTOPRAZOLE SODIUM 40 MG: 40 INJECTION, POWDER, FOR SOLUTION INTRAVENOUS at 16:55

## 2020-01-01 RX ADMIN — ACETAMINOPHEN 650 MG: 325 TABLET ORAL at 21:56

## 2020-01-01 RX ADMIN — TICAGRELOR 90 MG: 90 TABLET ORAL at 21:04

## 2020-01-01 RX ADMIN — HYDROCODONE BITARTRATE AND ACETAMINOPHEN 2 TABLET: 5; 325 TABLET ORAL at 07:53

## 2020-01-01 RX ADMIN — Medication 10 ML: at 21:34

## 2020-01-01 RX ADMIN — PHYTONADIONE 5 MG: 10 INJECTION, EMULSION INTRAMUSCULAR; INTRAVENOUS; SUBCUTANEOUS at 13:53

## 2020-01-01 RX ADMIN — IOPAMIDOL 75 ML: 755 INJECTION, SOLUTION INTRAVENOUS at 23:02

## 2020-01-01 RX ADMIN — DEXTROSE MONOHYDRATE 12.5 G: 25 INJECTION, SOLUTION INTRAVENOUS at 15:41

## 2020-01-01 RX ADMIN — PIPERACILLIN SODIUM AND TAZOBACTAM SODIUM 3.38 G: 3; .375 INJECTION, POWDER, LYOPHILIZED, FOR SOLUTION INTRAVENOUS at 22:00

## 2020-01-01 RX ADMIN — SODIUM CHLORIDE 50 ML: 9 INJECTION, SOLUTION INTRAVENOUS at 16:44

## 2020-01-01 RX ADMIN — CEFTRIAXONE SODIUM 1 G: 1 INJECTION, POWDER, FOR SOLUTION INTRAMUSCULAR; INTRAVENOUS at 16:55

## 2020-01-01 RX ADMIN — SODIUM CHLORIDE: 9 INJECTION, SOLUTION INTRAVENOUS at 18:23

## 2020-01-01 RX ADMIN — SODIUM CHLORIDE 1000 ML: 9 INJECTION, SOLUTION INTRAVENOUS at 00:54

## 2020-01-01 RX ADMIN — POLYETHYLENE GLYCOL (3350) 17 G: 17 POWDER, FOR SOLUTION ORAL at 14:33

## 2020-01-01 RX ADMIN — POLYETHYLENE GLYCOL (3350) 17 G: 17 POWDER, FOR SOLUTION ORAL at 14:53

## 2020-01-01 RX ADMIN — CITALOPRAM HYDROBROMIDE 15 MG: 20 TABLET ORAL at 16:56

## 2020-01-01 RX ADMIN — PIPERACILLIN SODIUM AND TAZOBACTAM SODIUM 4.5 G: 4; .5 INJECTION, POWDER, LYOPHILIZED, FOR SOLUTION INTRAVENOUS at 23:04

## 2020-01-01 RX ADMIN — SODIUM CHLORIDE: 9 INJECTION, SOLUTION INTRAVENOUS at 09:38

## 2020-01-01 RX ADMIN — POLYETHYLENE GLYCOL (3350) 17 G: 17 POWDER, FOR SOLUTION ORAL at 21:34

## 2020-01-01 RX ADMIN — SODIUM CHLORIDE 125 ML/HR: 9 INJECTION, SOLUTION INTRAVENOUS at 05:30

## 2020-01-01 RX ADMIN — Medication 250 MG: at 05:33

## 2020-01-01 RX ADMIN — SODIUM CHLORIDE 20 ML: 9 INJECTION, SOLUTION INTRAVENOUS at 15:05

## 2020-01-01 RX ADMIN — Medication 250 MG: at 18:32

## 2020-01-01 RX ADMIN — Medication 10 ML: at 20:29

## 2020-01-01 RX ADMIN — METOPROLOL SUCCINATE 50 MG: 50 TABLET, EXTENDED RELEASE ORAL at 17:44

## 2020-01-01 RX ADMIN — Medication 10 ML: at 21:04

## 2020-01-01 RX ADMIN — FENTANYL CITRATE 50 MCG: 50 INJECTION INTRAMUSCULAR; INTRAVENOUS at 10:25

## 2020-01-01 RX ADMIN — SODIUM CHLORIDE 20 ML: 9 INJECTION, SOLUTION INTRAVENOUS at 11:31

## 2020-01-01 RX ADMIN — PANTOPRAZOLE SODIUM 40 MG: 40 INJECTION, POWDER, FOR SOLUTION INTRAVENOUS at 10:29

## 2020-01-01 RX ADMIN — SODIUM CHLORIDE 20 ML: 9 INJECTION, SOLUTION INTRAVENOUS at 12:56

## 2020-01-01 RX ADMIN — PANTOPRAZOLE SODIUM 40 MG: 40 INJECTION, POWDER, FOR SOLUTION INTRAVENOUS at 08:34

## 2020-01-01 RX ADMIN — PANTOPRAZOLE SODIUM 40 MG: 40 INJECTION, POWDER, FOR SOLUTION INTRAVENOUS at 17:05

## 2020-01-01 RX ADMIN — DOCUSATE SODIUM 100 MG: 100 CAPSULE, LIQUID FILLED ORAL at 10:30

## 2020-01-01 RX ADMIN — CEFTRIAXONE SODIUM 1 G: 1 INJECTION, POWDER, FOR SOLUTION INTRAMUSCULAR; INTRAVENOUS at 18:22

## 2020-01-01 RX ADMIN — SODIUM CHLORIDE: 9 INJECTION, SOLUTION INTRAVENOUS at 17:01

## 2020-01-01 RX ADMIN — CITALOPRAM HYDROBROMIDE 15 MG: 20 TABLET ORAL at 08:15

## 2020-01-01 RX ADMIN — CEFTRIAXONE SODIUM 1 G: 1 INJECTION, POWDER, FOR SOLUTION INTRAMUSCULAR; INTRAVENOUS at 17:01

## 2020-01-01 RX ADMIN — PHYTONADIONE 10 MG: 10 INJECTION, EMULSION INTRAMUSCULAR; INTRAVENOUS; SUBCUTANEOUS at 16:42

## 2020-01-01 RX ADMIN — POLYETHYLENE GLYCOL (3350) 17 G: 17 POWDER, FOR SOLUTION ORAL at 21:04

## 2020-01-01 RX ADMIN — SODIUM CHLORIDE 3321 ML: 9 INJECTION, SOLUTION INTRAVENOUS at 21:08

## 2020-01-01 RX ADMIN — Medication 250 MG: at 12:07

## 2020-01-01 RX ADMIN — SODIUM CHLORIDE 8 MG/HR: 9 INJECTION, SOLUTION INTRAVENOUS at 23:53

## 2020-01-01 RX ADMIN — METOPROLOL SUCCINATE 50 MG: 50 TABLET, EXTENDED RELEASE ORAL at 10:29

## 2020-01-01 RX ADMIN — POLYETHYLENE GLYCOL (3350) 17 G: 17 POWDER, FOR SOLUTION ORAL at 20:29

## 2020-01-01 RX ADMIN — FUROSEMIDE 40 MG: 10 INJECTION, SOLUTION INTRAMUSCULAR; INTRAVENOUS at 12:36

## 2020-01-01 RX ADMIN — ACETAMINOPHEN 650 MG: 325 TABLET ORAL at 00:27

## 2020-01-01 RX ADMIN — SODIUM CHLORIDE: 9 INJECTION, SOLUTION INTRAVENOUS at 12:14

## 2020-01-01 RX ADMIN — DOCUSATE SODIUM 100 MG: 100 CAPSULE, LIQUID FILLED ORAL at 17:44

## 2020-01-01 RX ADMIN — MIDAZOLAM HYDROCHLORIDE 1 MG: 5 INJECTION INTRAMUSCULAR; INTRAVENOUS at 10:49

## 2020-01-01 RX ADMIN — ACETAMINOPHEN 650 MG: 325 TABLET ORAL at 08:34

## 2020-01-01 RX ADMIN — POTASSIUM CHLORIDE 10 MEQ: 7.46 INJECTION, SOLUTION INTRAVENOUS at 12:37

## 2020-01-01 RX ADMIN — PROPOFOL 60 MG: 10 INJECTION, EMULSION INTRAVENOUS at 08:10

## 2020-01-01 RX ADMIN — ALLOPURINOL 100 MG: 100 TABLET ORAL at 22:10

## 2020-01-01 RX ADMIN — ONDANSETRON HYDROCHLORIDE 4 MG: 2 INJECTION, SOLUTION INTRAMUSCULAR; INTRAVENOUS at 00:55

## 2020-01-01 RX ADMIN — SODIUM BICARBONATE: 84 INJECTION, SOLUTION INTRAVENOUS at 15:50

## 2020-01-01 RX ADMIN — ATORVASTATIN CALCIUM 40 MG: 40 TABLET, FILM COATED ORAL at 16:56

## 2020-01-01 RX ADMIN — MORPHINE SULFATE 4 MG: 4 INJECTION, SOLUTION INTRAMUSCULAR; INTRAVENOUS at 00:55

## 2020-01-01 RX ADMIN — POTASSIUM CHLORIDE 10 MEQ: 7.46 INJECTION, SOLUTION INTRAVENOUS at 15:14

## 2020-01-01 RX ADMIN — SODIUM CHLORIDE: 9 INJECTION, SOLUTION INTRAVENOUS at 20:29

## 2020-01-01 RX ADMIN — TICAGRELOR 90 MG: 90 TABLET ORAL at 08:33

## 2020-01-01 RX ADMIN — ATORVASTATIN CALCIUM 40 MG: 40 TABLET, FILM COATED ORAL at 08:15

## 2020-01-01 RX ADMIN — SODIUM BICARBONATE 650 MG: 650 TABLET ORAL at 08:15

## 2020-01-01 RX ADMIN — METOPROLOL SUCCINATE 50 MG: 50 TABLET, EXTENDED RELEASE ORAL at 08:34

## 2020-01-01 ASSESSMENT — PAIN DESCRIPTION - LOCATION
LOCATION: BACK
LOCATION: RECTUM
LOCATION: BACK
LOCATION: LEG
LOCATION: RECTUM
LOCATION: BACK

## 2020-01-01 ASSESSMENT — ENCOUNTER SYMPTOMS
SORE THROAT: 0
COUGH: 0
NAUSEA: 0
SHORTNESS OF BREATH: 0
BACK PAIN: 1
COUGH: 0
ABDOMINAL PAIN: 0
SHORTNESS OF BREATH: 1
DIARRHEA: 0
DIARRHEA: 1
SHORTNESS OF BREATH: 1
RECENT COUGH: 1
CHEST TIGHTNESS: 0
ABDOMINAL PAIN: 0
VOMITING: 0
VOMITING: 0
CONSTIPATION: 0

## 2020-01-01 ASSESSMENT — PAIN SCALES - GENERAL
PAINLEVEL_OUTOF10: 10
PAINLEVEL_OUTOF10: 9
PAINLEVEL_OUTOF10: 0
PAINLEVEL_OUTOF10: 10
PAINLEVEL_OUTOF10: 10
PAINLEVEL_OUTOF10: 6
PAINLEVEL_OUTOF10: 5
PAINLEVEL_OUTOF10: 0
PAINLEVEL_OUTOF10: 5
PAINLEVEL_OUTOF10: 10
PAINLEVEL_OUTOF10: 0
PAINLEVEL_OUTOF10: 6
PAINLEVEL_OUTOF10: 5
PAINLEVEL_OUTOF10: 0
PAINLEVEL_OUTOF10: 0
PAINLEVEL_OUTOF10: 6

## 2020-01-01 ASSESSMENT — PAIN DESCRIPTION - DESCRIPTORS
DESCRIPTORS: ACHING
DESCRIPTORS: SHARP
DESCRIPTORS: ACHING
DESCRIPTORS: ACHING

## 2020-01-01 ASSESSMENT — PATIENT HEALTH QUESTIONNAIRE - PHQ9: SUM OF ALL RESPONSES TO PHQ QUESTIONS 1-9: 6

## 2020-01-01 ASSESSMENT — PAIN DESCRIPTION - ONSET: ONSET: GRADUAL

## 2020-01-01 ASSESSMENT — PAIN DESCRIPTION - PAIN TYPE
TYPE: ACUTE PAIN
TYPE: ACUTE PAIN
TYPE: CHRONIC PAIN
TYPE: ACUTE PAIN
TYPE: CHRONIC PAIN

## 2020-01-01 ASSESSMENT — PAIN DESCRIPTION - FREQUENCY: FREQUENCY: INTERMITTENT

## 2020-01-01 ASSESSMENT — PAIN DESCRIPTION - ORIENTATION: ORIENTATION: RIGHT

## 2020-01-06 NOTE — PROGRESS NOTES
[address]   Cardiology Note              Date:  January 6, 2020  Patientname: Lauren Crawford  YOB: 1935    Chief Complaint   Patient presents with    Follow-Up from Hospital     no cardiac complaints      Primary Care physician: Blade Baxter MD    HISTORY OF PRESENT ILLNESS: Lauren Crawford is a 81 y.o. male who presented to [facility] 12/13 as advice from MD for potassium 6.5. While he was admitted he developed chest pain with ST changes. Troponin elevated and he was transferred to [facility] on 12/17/19 that showed multivessel CAD. He declined surgical revascularization. On 12/17 he had rotational atherectomy and NICKIE x4 to RCA. He was then sent home with holter monitor for bradycardia, found to be in complete heart block and pause of 5 seconds. He underwent a dual chamber pacemaker on 12/27/2019. Today he presents for hospital follow up s/p rational atherectomy and NICKIE x4 to RCA. He presents today with daughter. Overall he is feeling pretty good. Denies chest pain,  Increase of shortness of breath, palpitations and dizziness. States only shortness of breath he has is with exertion which is normal for him. He stated that his legs remain swollen and they are wrapped. He is going to wound clinic tomorrow. He is seeing nephrologist on Wednesday. He stated that he was on Lasix 40 mg BID prior to this hospital admission which helped with his chronic BLE. He stated he can tell he is not urinating as much with the decrease in Lasix. Pt stated at this time, he is not interested in staged procedure for the  of Lcx. Has not been weighing himself at home. He is wearing compression wraps which helps. He does have homecare that comes to check on patient. Lauren Crawford describes symptoms including dyspnea, fatigue, edema but denies chest pain, palpitations, orthopnea, PND, exertional chest pressure/discomfort, early saiety, syncope.      Past Medical History:   has a past medical history of Arthritis, Atrial flutter with rapid ventricular response (HCC), Blood transfusion, CAD (coronary artery disease), Cellulitis, Cellulitis of right anterior lower leg, CHF (congestive heart failure) ([facility] CHF exacerbation ([facility] [address] DVT (deep venous thrombosis) ([facility] [address] Gout, Hard of hearing, Hyperlipidemia, Hypertension, Irregular heart beat, and Prostate cancer ([facility] [address] Past Surgical History:   has a past surgical history that includes Prostate surgery; Cholecystectomy; joint replacement (05/16/11); eye surgery (2/26/13); other surgical history (05/24/2017); Cardiac catheterization (12/17/2019); Percutaneous Transluminal Coronary Angio (12/27/2019); and Cardiac pacemaker placement (12/28/2019). Home Medications:    Prior to Admission medications    Medication Sig Start Date End Date Taking? Authorizing Provider   metoprolol succinate (TOPROL XL) 50 MG extended release tablet Take 1 tablet by mouth daily 12/29/19  Yes CALIXTO Pizarro CNP   aspirin 81 MG chewable tablet Take 1 tablet by mouth daily 12/25/19  Yes Dayne Aguayo MD   sodium bicarbonate 650 MG tablet Take 1 tablet by mouth 3 times daily 12/24/19  Yes Dayne Aguayo MD   atorvastatin (LIPITOR) 80 MG tablet Take 1 tablet by mouth nightly 12/24/19  Yes Dayne Aguayo MD   furosemide (LASIX) 20 MG tablet Take 1 tablet by mouth every morning 12/24/19  Yes Dayne Aguayo MD   ticagrelor (BRILINTA) 90 MG TABS tablet Take 1 tablet by mouth 2 times daily 12/24/19  Yes Dayne Aguaoy MD   hydrALAZINE (APRESOLINE) 50 MG tablet Take 1 tablet by mouth every 8 hours 12/24/19  Yes Dayne Aguayo MD   mupirocin (BACTROBAN) 2 % ointment Apply topically 3 times daily Apply topically 3 times daily. Yes Historical Provider, MD   warfarin (COUMADIN) 2 MG tablet Take 2 or 3 mg daily as directed by coumadin clinic.   Patient taking differently: Take 2 or 3 mg daily as directed by coumadin clinic, take 3 mg on Sunday and Wednesday currently technique. The microcatheter was left in place and the choice wire was removed. A Rota floppy wire was advanced into the distal vessel and the microcatheter was removed. A 1.5 mm raheel was used to perform rotational atherectomy in the proximal and mid vessels. The bur would not advance beyond the mid vessel and atherectomy was confined to these segments. The vessel was then dilated with a 3 mm balloon distally to proximally. Vessel was then stented in the distal segment with a [employer] synergy 2.75 x 32 mm drug-eluting stent. Additional stents were placed from that juncture proximally including a [employer] synergy 3.0 x 38 mm drug-eluting stent and a [employer] synergy 3.5 x 32 mm drug-eluting stent. There was longitudinal shortening of the proximal stent with a area of geographic miss in the mid segment which was then stented with a [employer] synergy 3.5 x 12 mm drug-eluting stent. Stents were postdilated distally with a 3 mm noncompliant balloon, in the mid segment with a 3.5 mm noncompliant balloon and in the proximal segments with a 4 mm noncompliant balloon. There was 0% residual stenosis at all lesion sites. There was JENNIFER 3 flow before and after PCI at all lesion sites. Due to the length and complexity of this procedure, the circumflex artery was not felt to be approachable in this setting and this will be planned for a later date. CONCLUSIONS:   Successful rotational atherectomy and PCI of the RCA with 4 drug-eluting stents  Plan for staged circumflex  intervention pending recovery from this procedure. BBblocker on hold to jose/pauses at night.   Will order event monit       Coronary angiogram 12/17/2019:  Non-STEMI     PROCEDURES PERFORMED    Right heart catheterization  Left heart catheterization  LVgram  Coronary angiogam  Coronary cath  IVUS of left main and LAD  FFR of left main and LAD  PROCEDURE DESCRIPTION   Risks/benefits/alternatives/outcomes were discussed with patient and/or family and informed consent was obtained. Using the [facility] scale, the patient's right radial artery was found to be acceptable for cannulation. Patient was prepped draped in the usual sterile fashion. Local anaesthetic was applied over puncture sites. Using a micropuncture kit a 5 Sinhala sheath was inserted into the right internal jugular vein and a 5 Western Lily PA catheter was used for hemodynamic assessment. Using a back wall technique, a 6 Mosotho Terumo sheath was inserted into right radial artery. Verapamil, nitroglycerin were administered through the sheath. Heparin was administered. Diagnostic 5 Western Lily My Own Medtronic pigtail, ultra, Noyola, 3 DRC, AR-2 catheters were used for diagnostic angiograms. The pigtail catheter was used for LV gram, the ultra catheter was used for left main cannulation. The [employer] Gens and 3 [facility] catheters were taken but these catheters were not able to be used for cannulation of the RCA. Ultimately an AR-2 catheter was used for RCA cannulation. Attention turned towards intravascular assessment as noted below. At the conclusion of the procedure, a TR band was placed over the puncture site and hemostasis was obtained. Venous line was sutured in place for later removal.  There were no immediate complications. I supervised sedation with versed 7 mg/fentanyl 300 Mcg during the procedure. 210 cc contrast was utilized. <20cc EBL. FINDINGS      CHAMBER PRESSURE SATURATION   RA  7  72 %   RV  42/8     PA  33/12  72 %   PW  11     AORTA  125/55  92 %      CHERRIE CARDIAC OUTPUT  9 L/min   SVR  8 Wood units    PVR  1.5 Wood unit       LVEDP  12   GRADIENT ACROSS AORTIC VALVE  none   LV FUNCTION EF 50-55 %   WALL MOTION  grossly normal (possible subtle mid inferior HK)   MITRAL REGURGITATION  mild      LM  Proximal-mid less than 10% stenosis. Distal 40% stenosis. LAD Heavily calcified throughout the vessel. Proximal-mid 40 to 50% stenosis.   Mid 50 to 60% stenosis. Distal vessel at the apex is a 90% stenosis. There are 3 medium size diagonal branches. D1, D2, D3 have ostial 40 to 50% stenoses. LCX Calcified, proximal 60% stenosis followed by mid 100% occlusion. There are left to left collaterals which fill the distal vessel into a large OM branch         RCA Dominant, calcified, [id number] 80% stenosis. There is bidirectional flow noted into the RPDA. There are left to right collaterals noted. IVUS and FFR DESCRIPTION      Bivalirudin was used for anticoagulation. A 6 Malaysian mL 3.5 guiding catheter was used for IVUS as well as for FFR. A choice floppy wire was used to cross the LAD lesions and IVUS was performed with a Epuls IVUS catheter. MLA in the distal left main at the lesion site was 8 mm². The ostial LAD into the proximal vessel was greater than 6 mm². This would suggest a moderate lesion. Following DAMARIS, FFR measurements were made with a [place] FFR wire. The wire was appropriately calibrated and zeroed. It was equalized in the a sending aorta and was then taken with the same guide catheter into the LAD mid vessel. Resting and peak IV adenosine measurements were obtained. Measurements revealed a baseline resting FFR of 0.9 and a peak IV adenosine measurement of 0.85. This would suggest moderate left main and LAD lesions. During the procedure, patient experienced back pain which partially responded to sedatives and analgesics. Given findings as noted above, options were discussed with the patient and family and he has declined surgical evaluation. As such, will plan for PCI of the RCA and circumflex. Will start Brilinta. CONCLUSIONS:    Upper normal filling pressures, patient appears to have extravascular edema and will require diuresis as well as monitoring of kidney function.    Multivessel CAD/ASHD  Plan for high risk multivessel PCI pending clinical follow-up     Echo 12/17/2019:  Limited exam for left and right ventricular systolic function. Technically difficult examination. The left ventricular systolic function is low normal with an ejection   fraction of 45-50 %. There is hypokinesis of the inferolateral wall   Normal right ventricular function. Moderate tricuspid regurgitation. Systolic pulmonary artery pressure (SPAP) is estimated at 92 mmHg consistent   with severe pulmonary hypertension (Right atrial pressure of 3 mmHg). Cardiology Labs Reviewed:     Lab Data:  Most recent lab results below reviewed in office    CBC:   Lab Results   Component Value Date    WBC 6.2 12/28/2019    WBC 5.9 12/27/2019    WBC 7.6 12/22/2019    RBC 3.13 12/28/2019    RBC 3.26 12/27/2019    RBC 3.12 12/22/2019    HGB 9.8 12/28/2019    HGB 10.3 12/27/2019    HGB 9.9 12/22/2019    HCT 29.3 12/28/2019    HCT 30.2 12/27/2019    HCT 29.2 12/22/2019    MCV 93.7 12/28/2019    MCV 92.6 12/27/2019    MCV 93.4 12/22/2019    RDW 15.0 12/28/2019    RDW 15.3 12/27/2019    RDW 15.6 12/22/2019     12/28/2019     12/27/2019     12/22/2019     BMP:  Lab Results   Component Value Date     12/28/2019     12/27/2019     12/24/2019    K 4.0 12/28/2019    K 4.1 12/27/2019    K 3.9 12/24/2019    K 4.2 12/17/2019    K 5.0 12/16/2019    K 5.0 12/14/2019     12/28/2019     12/27/2019     12/24/2019    CO2 23 12/28/2019    CO2 24 12/27/2019    CO2 20 12/24/2019    PHOS 2.5 12/24/2019    PHOS 3.2 12/23/2019    PHOS 4.1 12/22/2019    BUN 34 12/28/2019    BUN 37 12/27/2019    BUN 37 12/24/2019    CREATININE 1.9 12/28/2019    CREATININE 2.2 12/27/2019    CREATININE 3.2 12/24/2019     BNP:   Lab Results   Component Value Date    PROBNP 965 12/10/2019    PROBNP 1,891 01/14/2016     FASTING LIPID PANEL:  Lab Results   Component Value Date    HDL 34 12/19/2019    LDLCALC 46 12/19/2019    TRIG 93 12/19/2019     LIVER PROFILE:No results for input(s): AST, ALT, ALB in the last 72 hours.   Reviewed all labs and imaging today    Assessment:   1. NSTEMI/CAD: stable denies chest pain    -s/p high risk PCI with rotablator and NICKIE x4 to RCA 12/20/19, staged  LCx in future  2. Ischemic cardiomyopathy: EF 45-50% per echo 12/17/19  3. HTN: controlled  4. HLD: 46, on statin  5. CKD: follows with nephrology   6. Hx of atrial flutter: currently SR   - [id number] score >2 on coumadin as OP              - s/p DCCV 2016              - amiodarone stopped 2016 due to bradycardia  7. Hx of DVT: on long term coumadin, follows at coumadin clinic   8. Chronic BLE edema    Plan:   1. Will check BMP today- pt has appointment with Nephrology on Wednesday- ill call you with results and if BMP stable then will given x1 dose of Lasix  2. No changes in medications at this time  3. Follow up with EP and device check as scheduled today  4. Pt requested to follow up with Dr. Adamaris Andres- was seeing Dr. Zi Rodriguez previously- If you are interested in pursuing a staged PCI to  please call office  5. Daily weights, low sodium diet, 2000 ml fluid restriction, if you gain 3 lbs in a day 5 lbs in a week, increased swelling, or increased shortness of breath please call the office. 6. Continue with BLE ace wraps and elevate BLE as much as possible throughout the day      Uriel Smart [address]  [phone]      QUALITY MEASURES  1. Tobacco Cessation Counseling: NA  2. Retake of BP if >140/90:   NA  3. Documentation to PCP/referring for new patient:  Sent to PCP at close of office visit  4. CAD patient on anti-platelet: Yes  5. CAD patient on STATIN therapy:  Yes  6.  Patient with CHF and aFib on anticoagulation:  Yes

## 2020-01-07 NOTE — PROGRESS NOTES
potassium chloride, sodium bicarbonate, ticagrelor, and warfarin. Allergies: Sulfa antibiotics    Pertinent items from the review of systems are discussed in the HPI; the remainder of the ROS was reviewed and is negative. Objective:     Vitals:    01/07/20 1337   BP: (!) 139/56   Pulse: 67   Resp: 18   Temp: 98.1 °F (36.7 °C)   TempSrc: Oral   Weight: 265 lb (120.2 kg)   Height: 5' 10\" (1.778 m)       General Appearance: alert and oriented to person, place and time, well developed and obese, in no acute distress  Psychiatric:  Mood and affect appropriate for situation  Skin: warm and dry, no rash  Head: normocephalic and atraumatic  Eyes: pupils equal, round, sclerae anicteric, conjunctivae normal  Neck:No complaints, normal appearance  Pulmonary/Chest: Respirations easy at rest, no cough or respiratory distress  Cardiovascular: No chest pain, normal rate, toes warm, cap refill normal  Abdomen: No nausea or vomiting  Extremities: no cyanosis,or cellulitis, 2+ pola edema -dry skin in patch along anterior LE. No open ulcers  Musculoskeletal: Ambulatory, moves all extremities, no deformities  Neurologic: distal sensation to light touch intact, no allodynia. Photos also saved in electronic chart.     Wound measurements today    Wound 12/10/19 #11, right lateral leg, venous, partial thickness, onset 11/10/2019-Wound Length (cm): 0 cm   Wound 12/10/19 #11, right lateral leg, venous, partial thickness, onset 11/10/2019-Wound Width (cm): 0 cm  Wound 12/10/19 #11, right lateral leg, venous, partial thickness, onset 11/10/2019-Wound Depth (cm): 0 cm      Lab Results   Component Value Date    LABALBU 3.5 12/24/2019     Lab Results   Component Value Date    CREATININE 1.7 (H) 01/06/2020     Lab Results   Component Value Date    HGB 9.8 (L) 12/28/2019     Lab Results   Component Value Date    LABA1C 5.3 05/17/2011     Assessment:     Patient Active Problem List   Diagnosis Code    Status post hip replacement Z96.649  CHF (congestive heart failure) ([facility]) I50.9    Atrial fibrillation ([facility]) I48.91    DVT (deep venous thrombosis) ([facility]) I82.409    Anticoagulated on Coumadin Z79.01    CKD (chronic kidney disease) N18.9    Neoplasm of uncertain behavior of skin D48.5    Venous insufficiency I87.2    Lymphedema I89.0    Chronic ulcer of right leg, limited to breakdown of skin ([address] L97.911    Chronic ulcer of left leg, limited to breakdown of skin ([facility]) L97.921    Hyperkalemia E87.5    CKD (chronic kidney disease) stage 3, GFR 30-59 ml/min ([facility]) N18.3    Unstable angina ([facility]) I20.0    NSTEMI (non-ST elevated myocardial infarction) ([facility]) I21.4    CAD (coronary artery disease) I25.10    Obesity E66.9    Sinus pause I45.5    Cardiac arrhythmia I49.9    Pacemaker Z95.0       Assessment of today's active condition(s): VLU healed. Factors contributing to occurrence and/or persistence of the chronic ulcer include edema, lymphedema and decreased mobility. Medical necessity of today's visit is shown by the above documentation. Sharp debridement is indicated today, based upon the exam findings in the ulcer(s) above. Discharge plan:     Treatment in the wound care center today:Lachydrin to pola LE after debridement of loose dry skin patches  . Home treatment: good handwashing before and after any dressing changes. Cleanse ulcer with saline or soap & water before dressing change. May use Vaseline (petrolatum), Aquaphor, Aveeno, CeraVe, Cetaphil, Eucerin, Lubriderm, etc for dry skin. Dressing type for home:Wash skin with wash cloth and soap and water, Rinse, Apply Lachydrin 2-3 times daily  Compression: Med Spandigrip    Written discharge instructions given to patient.     Follow up prn    Electronically signed by CALIXTO Marx CNP on 1/7/2020 at 3:38 PM.

## 2020-01-21 NOTE — TELEPHONE ENCOUNTER
Spoke to Miya and they will make a note.    They will reach out to Mr Erin Hameed notify of [facility] message and handle Aman Patel RN

## 2020-01-21 NOTE — TELEPHONE ENCOUNTER
LIN    The pharmacist called to see if patient is suppose to be taking both Brilinta and Warfarin together. The family wants to know if there is a less expensive alternative than Brilinta. However, Brilinta only cost $42 a month with pt's insurance.  Please advise

## 2020-02-06 NOTE — PATIENT INSTRUCTIONS
RECOMMENDATIONS:  1. Continue current medications   2. Continue remote device checks every 3 months   3. Discuss with Jesus Cho MD why he wanted to decrease hydralazine.    4. Follow up with me in 6 months

## 2020-02-06 NOTE — PROGRESS NOTES
mmHg).      IMPRESSION:    1. Sinus pauses    -up to 5 seconds seen on Monitor   2. Complete Heart block   -dual chamber pacemaker placement on 12/27/2020.   -device function within normal limits with good battery life. -follow up in 6 months then will extend to yearly. 3. Ischemic cardiomyopathy with mildly depressed ejection fraction   4. STEMI/CAD: stable denies chest pain         -s/p high risk PCI with rotablator and NICKIE x4 to RCA 12/20/19, staged  LCx in future  5, Hypertension   6. Hyperlipidemia     RECOMMENDATIONS:  1. Dual chamber pacemake-  Continue remote device checks every 3 months   2. Continue current medications   3. Discuss with Blade Baxter MD why he wanted to decrease hydralazine. Let our office know   4. Follow up with me in 6 months       QUALITY MEASURES  1. Tobacco Cessation Counseling: NA  2. Retake of BP if >140/90:   NA  3. Documentation to PCP/referring for new patient:  Sent to PCP at close of office visit  4. CAD patient on anti-platelet: Yes  5. CAD patient on STATIN therapy:  Yes  6. Patient with CHF and aFib on anticoagulation:  Yes     Scribe's attestation: This note was scribed in the presence of Dr. Jose Lamb by Nicola Hassan RN     All questions and concerns were addressed to the patient/family. Alternatives to my treatment were discussed. Dr. Jose Lamb MD  Electrophysiology  [facility]. [facility] [facility]. [address]. [place] [id number]  Phone: [phone]  Fax: [phone]   2/6/2020     NOTE: This report was transcribed using voice recognition software. Every effort was made to ensure accuracy, however, inadvertent computerized transcription errors may be present. The above documentation has been prepared under my direction and personally reviewed by me in its entirety.  I confirm the note above accurately reflects the work, physical examination, discussion of treatments and procedures, and medical decision making performed by the

## 2020-02-06 NOTE — LETTER
warfarin (COUMADIN) 2 MG tablet Take 2 or 3 mg daily as directed by coumadin clinic. Patient taking differently: Take 2 or 3 mg daily every other day 18  Yes Dipak Pruitt MD       REVIEW OF SYSTEMS:      All 14-point review of systems are completed and pertinent positives are mentioned in the history of present illness. Other systems are reviewed and are negative. Physical Examination:    /60   Pulse 61   Ht 5' 10\" (1.778 m)   Wt 266 lb (120.7 kg)   SpO2 99%   BMI 38.17 kg/m²     Constitutional and General Appearance: alert, cooperative, no distress and appears stated age  [de-identified]: PERRL, no cervical lymphadenopathy. No masses palpable. Normal oral mucosa  Respiratory:  · Normal excursion and expansion without use of accessory muscles  · Resp Auscultation: Normal breath sounds without dullness or wheezing  Cardiovascular:  · The apical impulse is not displaced  · Heart tones are crisp and normal. regular S1 and S2.  · Jugular venous pulsation Normal  · The carotid upstroke is normal in amplitude and contour without delay or bruit  · Peripheral pulses are symmetrical and full  Abdomen:  · No masses or tenderness  · Bowel sounds present  Extremities:  ·  No Cyanosis or Clubbing  ·  Lower extremity edema: Chronic venous changes. · Skin: Warm and dry. Wound well healed with glue still in place  Neurological:  · Alert and oriented. · Moves all extremities well  · No abnormalities of mood, affect, memory, mentation, or behavior are noted    DATA:    EC2020  Electronic atrial pacemaker   -Right bundle branch block. ECHO: 19  Summary   Limited exam for left and right ventricular systolic function. Technically difficult examination. The left ventricular systolic function is low normal with an ejection   fraction of 45-50 %. There is hypokinesis of the inferolateral wall   Normal right ventricular function. Moderate tricuspid regurgitation. Systolic pulmonary artery pressure (SPAP) is estimated at 92 mmHg consistent   with severe pulmonary hypertension (Right atrial pressure of 3 mmHg). IMPRESSION:    1. Sinus pauses    -up to 5 seconds seen on Monitor   2. Complete Heart block   -dual chamber pacemaker placement on 12/27/2020.   -device function within normal limits with good battery life. -follow up in 6 months then will extend to yearly. 3. Ischemic cardiomyopathy with mildly depressed ejection fraction   4. STEMI/CAD: stable denies chest pain         -s/p high risk PCI with rotablator and NICKIE x4 to RCA 12/20/19, staged  LCx in future  5, Hypertension   6. Hyperlipidemia     RECOMMENDATIONS:  1. Dual chamber pacemake-  Continue remote device checks every 3 months   2. Continue current medications   3. Discuss with Jelani Box MD why he wanted to decrease hydralazine. Let our office know   4. Follow up with me in 6 months       QUALITY MEASURES  1. Tobacco Cessation Counseling: NA  2. Retake of BP if >140/90:   NA  3. Documentation to PCP/referring for new patient:  Sent to PCP at close of office visit  4. CAD patient on anti-platelet: Yes  5. CAD patient on STATIN therapy:  Yes  6. Patient with CHF and aFib on anticoagulation:  Yes     Scribe's attestation: This note was scribed in the presence of Dr. Bernestine Boas by Padilla Cantu RN     All questions and concerns were addressed to the patient/family. Alternatives to my treatment were discussed. Dr. Bernestine Boas MD  Electrophysiology  [facility]. [address] [place] [address]. [address]. [place] [address]  Phone: [phone]  Fax: [phone]   2/6/2020     NOTE: This report was transcribed using voice recognition software. Every effort was made to ensure accuracy, however, inadvertent computerized transcription errors may be present.       The above documentation has been prepared under my direction and personally reviewed by me in its entirety. I confirm the note above accurately reflects the work, physical examination, discussion of treatments and procedures, and medical decision making performed by the nurse and myself.      Electronically signed by Aye Bergman MD on 2/6/2020 at 10:00 PM

## 2020-03-09 NOTE — PROGRESS NOTES
Patient presents with history of A-Fib and recurrent DVT , on long term anticoagulation with warfarin. He is here for anticoagulation monitoring and adjustment. 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 Denies bleeding/bruising  Denies blood in urine/stool  No changes in Rx/OTC/Herbal supplementation. Patient is not a smoker, he did state that he drinks one beer nightly. Pt denies any missed doses. Pt indicates that he is not a big vegetable eater. Now has Marycruz Luna. RN calls in INR of 2.0 from yesterday. INR 2.0 is within acceptable therapeutic range (goal range of 2-3). Recommend to continue dose of 2 mg daily, except 3 mg on Sun/Wed/Fri  He has Warfarin 4, 3, and 2 mg tablets at home. Will continue to monitor and check INR in 1 week  Dosing card with dose and next appt time was given.   Return to clinic: Marycruz Luna ~3/16/20     Ivett Pelaez, PharmD 3:03 PM EDT 3/9/20

## 2020-05-20 NOTE — ED PROVIDER NOTES
7:02 AM: I discussed the history, physical examination, laboratory and imaging studies, and treatment plan with Dr. Harriet Live. Rissa Chaidezel was signed out to me in stable condition. Please see Dr. Milner Setswana documentation for details of their history, physical, and laboratory studies. Upon re-examination, a summary of Janelle Lovelace's history, physical examination, and studies are as follows: Patient presenting for evaluation of acute on chronic low back pain. He states that it hurts across the entire back and does radiate somewhat down his leg. He also noted that his left middle finger has had worsening pain over the past several days. No fever. No loss of bowel or bladder control. He is currently anticoagulated. He denies any fall or injuries of both his back or finger. On examination, patient is awake and alert with GCS 15. Focal examination of the left middle finger is swollen, erythematous from the MCP joint to just distal to the PIP joint. There is no crepitus, no necroses or any noted vesicles. He is able to flex and extend it although somewhat limited secondary to the swelling and pain. No tenderness along the flexor tendon sheath. On patient's back exam, he has mild discomfort with palpation of the paraspinal area especially on the right but no central midline tenderness or step-offs. I was able to elicit 2+ patellar reflexes on both the left lower extremity and the right lower extremity but patient has significant thickened and callused skin and therefore Achilles tendon reflexes unable to be obtained. Strength is noted to be 5/5. Positive straight leg test on the left. Sensory intact to light touch in the lower extremity bilaterally.       Results for orders placed or performed during the hospital encounter of 05/20/20   CBC Auto Differential   Result Value Ref Range    WBC 7.4 4.0 - 11.0 K/uL    RBC 2.93 (L) 4.20 - 5.90 M/uL    Hemoglobin 8.0 (L) 13.5 - 17.5 g/dL    Hematocrit 24.8 (L) STENOSIS, OR HERNIATED DISK CAUSING SEVERE STENOSIS, thus I consider the discharge disposition reasonable. Iain Harrison and I have discussed the diagnosis and risks, and we agree with discharging home to follow-up with their primary doctor. We also discussed returning to the Emergency Department immediately if new or worsening symptoms occur. We have discussed the symptoms which are most concerning (e.g., saddle anesthesia, urinary or bowel incontinence or retention, changing or worsening pain) that necessitate immediate return. Final Impression    1. Acute exacerbation of chronic low back pain    2. Swelling of left middle finger        Blood pressure (!) 178/70, pulse 99, temperature 99.1 °F (37.3 °C), temperature source Rectal, resp. rate 19, height 5' 10\" (1.778 m), weight 244 lb (110.7 kg), SpO2 99 %. Final Disposition:  Albin Burks was discharged home in stable condition.       Mario Talavera MD  05/20/20 [id number]

## 2020-05-20 NOTE — ED PROVIDER NOTES
Emergency Physician Note    Chief Complaint  Back Pain (pt c/o of severe back pain; pt has a HX: of chronic back pain; pt states that he has had back pain and leg pain since a year ago) and Finger Pain (pt has a swollen and red middle finger)       History of Present Illness  Michelle Corbin is a 81 y.o. male who presents to the ED for back pain. Patient has chronic lower back pain, the back pain has been in his lower back for over a year. He states he woke up with significantly worsening back pain particularly on the right side he is also feeling pain radiating down his left leg. Patient also has swelling and redness of the middle finger of his left hand he is not sure what happened and it does not appear to be bothering him but however he does report that he is unable to tightly  things with his left hand. Patient recently switched from Coumadin to apixaban. Patient has a history of bilateral lower extremity lymphedema with venous stasis changes, he has home health care in regards to these venous stasis changes. He states nothing is new as far as his lower extremity issues. Denies fever, chills, malaise, chest pain, shortness of breath, cough, abdominal pain, nausea, vomiting, diarrhea, headache, sore throat, dysuria, rash. No palliative/provocative factors. Unless otherwise stated in this report or unable to obtain because of the patient's clinical or mental status as evidenced by the medical record, this patient's positive and negative responses for review of systems, constitutional, psych, eyes, ENT, cardiovascular, respiratory, gastrointestinal, neurological, genitourinary, musculoskeletal, integument systems and systems related to the presenting problem are either stated in the preceding paragraph or were not pertinent or were negative for the symptoms and/or complaints related to the medical problem.     I have reviewed the following from the nursing documentation:      Prior to Admission medications    Medication Sig Start Date End Date Taking? Authorizing Provider   metoprolol succinate (TOPROL XL) 50 MG extended release tablet TAKE ONE TABLET BY MOUTH DAILY 4/20/20  Yes CALIXTO Kirk CNP   sodium bicarbonate 650 MG tablet Take 1 tablet by mouth 3 times daily 12/24/19  Yes Toni Sol MD   atorvastatin (LIPITOR) 80 MG tablet Take 1 tablet by mouth nightly 12/24/19  Yes Toni Sol MD   furosemide (LASIX) 20 MG tablet Take 1 tablet by mouth every morning 12/24/19  Yes Toni Sol MD   ticagrelor (BRILINTA) 90 MG TABS tablet Take 1 tablet by mouth 2 times daily 12/24/19  Yes Toni Sol MD   hydrALAZINE (APRESOLINE) 50 MG tablet Take 1 tablet by mouth every 8 hours 12/24/19  Yes Toni Sol MD   warfarin (COUMADIN) 2 MG tablet Take 1 tablet by mouth daily Or as directed by coumadin clinic 1/8/20   Samanta Douglas MD   potassium chloride (KLOR-CON M) 10 MEQ extended release tablet Take 1 tablet by mouth daily Take 2 tablets today and then one daily after. Repeat blood work on Friday 1/7/20   CALIXTO Kirk CNP   warfarin (COUMADIN) 2 MG tablet Take 2 or 3 mg daily as directed by coumadin clinic.   Patient taking differently: Take 2 or 3 mg daily every other day 9/20/18   Samanta Douglas MD       Allergies as of 05/20/2020 - Review Complete 05/20/2020   Allergen Reaction Noted    Sulfa antibiotics  05/16/2011       Past Medical History:   Diagnosis Date    Arthritis     Atrial flutter with rapid ventricular response ([address]     Blood transfusion     CAD (coronary artery disease) 12/2019    NSTEMI, + Troponins    Cellulitis 9/19/2012    Cellulitis of right anterior lower leg 5/10/2018    CHF (congestive heart failure) ([address]     CHF exacerbation (HCC)     DVT (deep venous thrombosis) ([address]     right leg    Gout     Hard of hearing 2019    wears hearing aids    Hyperlipidemia     Hypertension     Irregular heart beat     [facility]     kaycee score 4+3        Surgical History:   Past Surgical History:   Procedure Laterality Date    CARDIAC CATHETERIZATION  2019    Multi Vessel CAD, needs intervention    CARDIAC PACEMAKER PLACEMENT  2019    CHOLECYSTECTOMY      EYE SURGERY  13    Left Eye Cataract Removal    JOINT REPLACEMENT  11    right total hip arthroplasty    OTHER SURGICAL HISTORY  2017    excision of skin lesion chest and left neck    PACEMAKER PLACEMENT      PROSTATE SURGERY      PTCA  2019        Family History:    Family History   Problem Relation Age of Onset    Heart Disease Mother     Arthritis Sister     Asthma Brother        Social History     Socioeconomic History    Marital status:      Spouse name: Not on file    Number of children: Not on file    Years of education: Not on file    Highest education level: Not on file   Occupational History    Not on file   Social Needs    Financial resource strain: Not on file    Food insecurity     Worry: Not on file     Inability: Not on file    Transportation needs     Medical: Not on file     Non-medical: Not on file   Tobacco Use    Smoking status: Former Smoker     Types: Pipe     Last attempt to quit: 1970     Years since quittin.9    Smokeless tobacco: Former User     Types: Chew     Quit date: 1962   Substance and Sexual Activity    Alcohol use:  Yes     Alcohol/week: 2.0 standard drinks     Types: 2 Cans of beer per week    Drug use: No    Sexual activity: Yes     Partners: Female   Lifestyle    Physical activity     Days per week: Not on file     Minutes per session: Not on file    Stress: Not on file   Relationships    Social connections     Talks on phone: Not on file     Gets together: Not on file     Attends Mosque service: Not on file     Active member of club or organization: Not on file     Attends meetings of clubs or organizations: Not on file     Relationship status: Not on

## 2020-05-21 NOTE — CARE COORDINATION
Called patient for updates. Left message for return call.   Cortney Knox BSN  Care Transition Nurse   [phone]

## 2020-06-01 NOTE — PROGRESS NOTES
Patient's remote interrogation was not received for patient's virtual visit with Dr. Jovany Coon today. Patient and daughter were both instructed on how to send manual transmission, however it was not received. Patient was given [employer]'s phone number to help troubleshoot monitor issues.

## 2020-06-02 NOTE — PROGRESS NOTES
Patient's remote transmission received after virtual visit with Dr. Gem Tim. Remote transmission of pacemaker and/or ICD, or implanted heart monitor shows normal cardiac device function. Patient's last device interrogation was on 1/6. Estimated device longevity is 14.5 years. AP 48%   8%    Since last device interrogation, 11 PMT events recorded. 5 ATR events recorded with overall burden of <1%. The longest recorded ATR event was on 4/28 x approximately 4 hours (two events appear to be nearly consecutive). 2 NSVT events recorded with the longest and most recent on 4/22 x 15 seconds. Both NSVT events appear to be PAT with 1:1 conduction. Patient is to follow up in 3 months either remotely or in clinic. Please see the [employer] report under the Cardiology tab for more detail.

## 2020-06-02 NOTE — LETTER
2020    TELEHEALTH EVALUATION -- Audio(During GFGTF-45 public health emergency)    HPI:  Priyanka Allen is a 81 y.o. male who presents for follow up for complete heart block and pauses which requires dual chamber pacemaker implantation. He has a past medical history significant for ischemic cardiomyopathy with mildly depressed ejection fraction, bradycardia, atrial fibrillation/flutter on warfarin, chronic lymphedema, stage III-IV chronic renal insufficiency complicated by history of hyperkalemia, hypertension, and hyperlipidemia who presents from home after a home monitor, which was placed for bradycardia, found complete heart block and pause of 5 seconds. He underwent dual chamber pacemaker placement on 2020. He declined surgical revascularization. On  he had rotational atherectomy and NICKIE x4 to RCA. An echocardiogram rom 19 showed his EF is 45-50%. Priyanka Allen (:  1935) has requested an audio evaluation for the following concern(s):    Today he states that he has had back pain. He rates this about a 10/10. He states that this developed after lying flat from procedure. PCP is giving him Percocet for it. He went to ED on 2020 for back pain and had an x-ray. This showed advanced ankylosing spondylitis. He states that his medications make him feel funny. He cannot pinpoint which one. He is now being treated for gout by his PCP. Daughter states that patient is tired and doesn't feel good. Review of Systems    Prior to Visit Medications    Medication Sig Taking?  Authorizing Provider   metoprolol succinate (TOPROL XL) 50 MG extended release tablet TAKE ONE TABLET BY MOUTH DAILY Yes CALIXTO Barry - CNP   warfarin (COUMADIN) 2 MG tablet Take 1 tablet by mouth daily Or as directed by coumadin clinic Yes Albin Quevedo MD   potassium chloride (KLOR-CON M) 10 MEQ extended release tablet Take 1 4. Chronic renal insufficiency.     5. Atrial fibrillation. Patient with history of atrial fibrillation. [id number] score >2 (vascular disease, age, hypertension, and heart failure). On metoprolol and warfarin.   - Continue metoprolol.  - Continue warfarin.  - Consult pharmacy to assist with getting INR to 2-3.  - Plan as per above.     Plan:  1. Suggested referral to [place] [facility] for back pain  2. Continue current course  3. Follow up in 6 months      Loy Bacon is a 81 y.o. male being evaluated by a telephone Visit  encounter to address concerns as mentioned above. A caregiver was present when appropriate. Due to this being a TeleHealth encounter (During [facility] public health emergency), evaluation of the following organ systems was limited: Vitals/Constitutional/EENT/Resp/CV/GI//MS/Neuro/Skin/Heme-Lymph-Imm. Pursuant to the emergency declaration under the [address] [place] [address] authority and the [employer] and [employer] Act, this telephone Visit was conducted with patient's (and/or legal guardian's) consent, to reduce the patient's risk of exposure to COVID-19 and provide necessary medical care. The patient (and/or legal guardian) has also been advised to contact this office for worsening conditions or problems, and seek emergency medical treatment and/or call 911 if deemed necessary. Patient identification was verified at the start of the visit: Yes    Total time spent on this encounter: 15  This note was scribed in the presence of aGyatri Szymanski MD by Domonique Fonseca RN. Services were provided through a video synchronous discussion virtually to substitute for in-person clinic visit. Patient and provider were located at their individual homes. --Domonique Fonseca RN on 6/2/2020 at 10:28 AM    An electronic signature was used to authenticate this note.

## 2020-06-15 NOTE — ED PROVIDER NOTES
[address] ED  EMERGENCY DEPARTMENT ENCOUNTER      Pt Name: Lorin Babcock  MRN: [id number]  [place] 1935  Date of evaluation: 6/15/2020  Provider: Bryson Oshea MD    CHIEF COMPLAINT       Chief Complaint   Patient presents with    Fatigue     family calls ems because he has now become bed bound which he normaly walks and was hoping to get him at [facility], ems heart rate 150,          HISTORY OF PRESENT ILLNESS   (Location/Symptom, Timing/Onset, Context/Setting, Quality, Duration, Modifying Factors, Severity)  Note limiting factors. Lorin Babcock is a 81 y.o. male who presents to the emergency department     Patient is an 52-year-old male with a past medical history of hypertension, hyperlipidemia, CHF, atrial flutter on anticoagulation with warfarin, coronary artery disease who presents with altered mental status. Family states that patient typically is able to walk around but lately has become bedbound. Patient reports pain in his low back and hips. EMS was called because family wanted him evaluated and hopefully placed in a nursing facility. Patient was found to be tachycardic and here in the emergency department also found to be febrile. Patient denies any chills, chest pain, cough, nausea, vomiting, abdominal pain, dysuria, or diarrhea. The history is provided by the patient. Nursing Notes were reviewed. REVIEW OF SYSTEMS    (2-9 systems for level 4, 10 or more for level 5)     Review of Systems   Constitutional: Negative for chills and fever. HENT: Negative for congestion and sore throat. Eyes: Negative for visual disturbance. Respiratory: Negative for cough and shortness of breath. Cardiovascular: Negative for chest pain. Gastrointestinal: Negative for abdominal pain, constipation, diarrhea, nausea and vomiting. Genitourinary: Negative for dysuria and hematuria. Musculoskeletal: Positive for arthralgias and back pain.  Negative for neck 1 tablet by mouth 3 times daily    TICAGRELOR (BRILINTA) 90 MG TABS TABLET    Take 1 tablet by mouth 2 times daily    WARFARIN (COUMADIN) 2 MG TABLET    Take 2 or 3 mg daily as directed by coumadin clinic. WARFARIN (COUMADIN) 2 MG TABLET    Take 1 tablet by mouth daily Or as directed by coumadin clinic       ALLERGIES     Sulfa antibiotics    FAMILY HISTORY       Family History   Problem Relation Age of Onset    Heart Disease Mother     Arthritis Sister     Asthma Brother           SOCIAL HISTORY       Social History     Socioeconomic History    Marital status:      Spouse name: None    Number of children: None    Years of education: None    Highest education level: None   Occupational History    None   Social Needs    Financial resource strain: None    Food insecurity     Worry: None     Inability: None    Transportation needs     Medical: None     Non-medical: None   Tobacco Use    Smoking status: Former Smoker     Types: Pipe     Last attempt to quit: 1970     Years since quittin.0    Smokeless tobacco: Former User     Types: [address] date: 1962   Substance and Sexual Activity    Alcohol use:  Yes     Alcohol/week: 2.0 standard drinks     Types: 2 Cans of beer per week    Drug use: No    Sexual activity: Yes     Partners: Female   Lifestyle    Physical activity     Days per week: None     Minutes per session: None    Stress: None   Relationships    Social connections     Talks on phone: None     Gets together: None     Attends Voodoo service: None     Active member of club or organization: None     Attends meetings of clubs or organizations: None     Relationship status: None    Intimate partner violence     Fear of current or ex partner: None     Emotionally abused: None     Physically abused: None     Forced sexual activity: None   Other Topics Concern    None   Social History Narrative    None       SCREENINGS               PHYSICAL EXAM    (up to 7 for Notable for the following components:    Troponin 0.17 (*)     All other components within normal limits    Narrative:     Performed at:  [facility],  [place], [employer]   Phone [phone]   BRAIN NATRIURETIC PEPTIDE - Abnormal; Notable for the following components:    Pro-BNP 2,454 (*)     All other components within normal limits    Narrative:     Performed at:  [facility],  [place], [employer]   Phone [phone]   PROTIME-INR - Abnormal; Notable for the following components:    Protime 24.0 (*)     INR 2.05 (*)     All other components within normal limits    Narrative:     Performed at:  [facility],  [place], [employer]   Phone [phone]   MICROSCOPIC URINALYSIS - Abnormal; Notable for the following components:    Mucus, UA 1+ (*)     WBC, UA 21-50 (*)     RBC, UA 11-20 (*)     Bacteria, UA 1+ (*)     All other components within normal limits    Narrative:     Performed at:  [facility]) [facility],  [place], [employer]   Phone [phone]   CULTURE, BLOOD 1   CULTURE, BLOOD 2   CULTURE, URINE   LIPASE    Narrative:     Performed at:  [facility]) - [facility],  [place], [employer]   Phone [phone]       All other labs were within normal range or not returned as of this dictation. EMERGENCY DEPARTMENT COURSE and DIFFERENTIAL DIAGNOSIS/MDM:   Vitals:    Vitals:    06/15/20 2050 06/15/20 2120 06/15/20 2135 06/15/20 2148   BP: (!) 120/57 (!) 104/51 (!) 115/58 (!) 100/51   Pulse: 134 135 118 118   Resp: 22 15 20 28   Temp:    102.1 °F (38.9 °C)   TempSrc:    Axillary   SpO2: 97% 94% 95% 94%   Weight:       Height:    5' 10\" (1.778 m)       Patient evaluated and previous record reviewed.   Patient presents for fatigue and difficulty with mobility found to be tachycardic by EMS. Vital signs notable for tachycardia and febrile. Physical exam as documented above. Lab work-up notable for white blood cell count within normal limits, lactic acidosis, electrolytes within normal limits, UA consistent with UTI, elevated troponin, elevated BNP, therapeutic INR. Chest x-ray without acute cardiopulmonary abnormality. Patient given 30 mL/kg of IV fluids and covered broadly with vancomycin and Zosyn. CT scan pending as patient reports pain in his bilateral low back. Patient handed off to Dr. Iveth Funez    CONSULTS:  None    PROCEDURES:  Unless otherwise noted below, none     Critical Care  Performed by: Florian Lees MD  Authorized by: Florian Lees MD     Critical care provider statement:     Critical care time (minutes):  32    Critical care time was exclusive of:  Separately billable procedures and treating other patients and teaching time    Critical care was necessary to treat or prevent imminent or life-threatening deterioration of the following conditions:  Sepsis    Critical care was time spent personally by me on the following activities:  Development of treatment plan with patient or surrogate, evaluation of patient's response to treatment, examination of patient, obtaining history from patient or surrogate, ordering and performing treatments and interventions, ordering and review of laboratory studies, ordering and review of radiographic studies, pulse oximetry, re-evaluation of patient's condition and review of old charts          FINAL IMPRESSION      1. Fever, unspecified fever cause    2. Lactic acidosis    3. Acute cystitis without hematuria          DISPOSITION/PLAN   DISPOSITION        PATIENT REFERRED TO:  No follow-up provider specified. DISCHARGE MEDICATIONS:  New Prescriptions    No medications on file     Controlled Substances Monitoring:     No flowsheet data found.     (Please note that portions of this note were completed with a voice recognition

## 2020-06-16 PROBLEM — M46.46 DISCITIS OF LUMBAR REGION: Status: ACTIVE | Noted: 2020-01-01

## 2020-06-16 PROBLEM — M54.50 ACUTE MIDLINE LOW BACK PAIN WITHOUT SCIATICA: Status: ACTIVE | Noted: 2020-01-01

## 2020-06-16 PROBLEM — A41.9 SEPSIS (HCC): Status: ACTIVE | Noted: 2020-01-01

## 2020-06-16 NOTE — PROGRESS NOTES
Vancomycin Day: 2    Patient's labs, cultures, vitals, and vancomycin regimen reviewed. No changes today.   [facility] [address] 6/16/20201:39 PM  .

## 2020-06-16 NOTE — H&P
Hospital Medicine History & Physical      PCP: Lynne Woodall MD    Date of Admission: 6/15/2020    Date of Service: Pt seen/examined on 6/16/2020    Chief Complaint:    Chief Complaint   Patient presents with    Fatigue     family calls ems because he has now become bed bound which he normaly walks and was hoping to get him at [facility], ems heart rate 150,          History Of Present Illness: The patient is a 81 y.o. male with a PMH of CAD s/p PCI & NICKIE x 4 (NSTEMI in 12/2019), Ischemic CM, HTN, CKD stage III, PAF AC on Coumadin, hx of complete heart block s/p dual chamber pacemaker and chronic lymphedema who presented to the ED with complaint of increasing fatigue and decreased ambulatory status. Pt is a somewhat poor historian and reports that he came in because of his pancreas. He is oriented x 3. Denies any abdominal pain but when asked does complain of low back pain that has limited his mobility for quite some time. Per ER report - family was concerned as he has been able to ambulate freely in the past but has become increasingly more bed bound. He did express pain in low hips and back. Denied any constipation or blood in his stool. Work up in the ED did show pt had a fever, lactic acidosis and CT of the abdomen concerning for poss discitis as well as a large stool burden with inflammation concerning for stercoral colitis. Pt was admitted to Med Surg for further work up and management.     Past Medical History:        Diagnosis Date    Arthritis     Atrial flutter with rapid ventricular response ([address]     Blood transfusion     CAD (coronary artery disease) 12/2019    NSTEMI, + Troponins    Cellulitis 9/19/2012    Cellulitis of right anterior lower leg 5/10/2018    CHF (congestive heart failure) ([address]     CHF exacerbation (HCC)     DVT (deep venous thrombosis) ([address]     right leg    Gout     Hard of hearing 2019    wears hearing aids    Hyperlipidemia     Hypertension     Allergies:  Sulfa antibiotics    Social History:  The patient currently lives at home. TOBACCO:   reports that he quit smoking about 50 years ago. His smoking use included pipe. He quit smokeless tobacco use about 58 years ago. His smokeless tobacco use included chew. ETOH:   reports current alcohol use of about 2.0 standard drinks of alcohol per week. Family History:   Positive as follows:        Problem Relation Age of Onset    Heart Disease Mother     Arthritis Sister     Asthma Brother        REVIEW OF SYSTEMS:     Constitutional: + fever   HENT: Negative for sore throat   Eyes: Negative for redness   Respiratory: Negative  for dyspnea, cough   Cardiovascular: Negative for chest pain   Gastrointestinal: Negative for vomiting, diarrhea   Genitourinary: Negative for hematuria   Musculoskeletal: + arthralgias   Skin: Negative for rash   Neurological: Negative for syncope   Hematological: Negative for adenopathy   Psychiatric/Behavorial: Negative for anxiety    PHYSICAL EXAM:    /63   Pulse 73   Temp 97.9 °F (36.6 °C) (Axillary)   Resp 20   Ht 5' 10\" (1.778 m)   Wt 229 lb 4.8 oz (104 kg)   SpO2 92%   BMI 32.90 kg/m²   Gen: No distress. Awake. Elderly  male, mildly [place]  Eyes:  No sclera icterus. No conjunctival injection. Neck: Trachea midline. Resp: No accessory muscle use. No crackles. No wheezes. No rhonchi. On RA  CV: Regular rate. Regular rhythm. No murmur. No rub. GI: Soft, Non-tender. Non-distended. Normal bowel sounds. Skin: Warm and dry. Chronic lymphedema to BLE  M/S: Unable to examine back 2/2 worsening pain w/ movement, pain w/ bilateral SLR  Neuro: Awake. Grossly nonfocal    Psych: Oriented x 3, unable to recall president. No anxiety or agitation.      CBC:   Recent Labs     06/15/20  2042 06/16/20  0419   WBC 4.6 13.8*   HGB 8.1* 6.7*   HCT 26.2* 21.5*   MCV 87.6 86.8   PLT see below 145     BMP:   Recent Labs     06/15/20  2042 06/16/20  0419    135* PORTABLE   Final Result   Stable portable study. IR PERC ASPIRATION INTERVERT DISC    (Results Pending)         CHRIS Gayle have reviewed the chart on [facility] and personally interviewed and examined patient, reviewed the data (labs and imaging) and after discussion with my PA formulated the plan. Agree with note with the following edits. HPI:     Patient is an 29-year-old white male who was somewhat sleepy this morning when I made rounds. Is a history of hypertension, hyperlipidemia, CHF, a flutter on chronic Coumadin, CAD who presented emergency room with altered mental status. Patient can typically walk around per family members but apparently become bedbound. He was complaining of pain in his low back and hip. In the ER was found to have a fever of 102.9. Denied any chest pain or shortness of breath. Denied any nausea vomiting or diarrhea. He does have a pacemaker. Patient had a Carney catheter placed in the ER. He did show increased white cells and small leukocyte esterase. He denies any urinary symptoms. I reviewed the patient's Past Medical History, Past Surgical History, Medications, and Allergies. Physical exam:    /63   Pulse 73   Temp 97.9 °F (36.6 °C) (Axillary)   Resp 20   Ht 5' 10\" (1.778 m)   Wt 229 lb 4.8 oz (104 kg)   SpO2 92%   BMI 32.90 kg/m²     Gen: Elderly white male who is just waking up from his sleep  Eyes: PERRL. No sclera icterus. No conjunctival injection. ENT: No discharge. Pharynx clear. Neck: Trachea midline. Normal thyroid. Resp: No accessory muscle use. No crackles. No wheezes. No rhonchi. No dullness on percussion. CV: Regular rate. Regular rhythm. No murmur or rub. + edema. GI: Non-tender. Non-distended. No masses. No organomegaly. Normal bowel sounds. No hernia. Skin: Chronic lymphedema with chronic ulceration of both legs  Lymph: No cervical LAD. No supraclavicular LAD. M/S: No cyanosis.  No joint deformity. No clubbing. Patient has significant pain when he tries to sit up in his low back  Neuro: Sleepy. Reflexes diminished symmetric bilaterally. Moves all 4 extremities. Has some pain when he tries to raise his leg up  Psych: Oriented x 3. No anxiety or agitation. ASSESSMENT/PLAN:    Sepsis  Poss Discitis  Stercoral Colitis  - with fevers, lactic acidosis (4.6), leukocytosis  - CT abdomen showed moderate endplate sclerosis & irregularity at L1 and L4-5 with associated disc space widening; also: stool throughout colon, dense stool w/in mildly distended rectosigmoid colon with adjacent fat stranding  - Admit to Med Surg  - trend LA, Vanc and Zosyn D#1, IVF, PRN morphine  - 1x mineral oil enema, BID miralax and [address] colace, PRN Senna  - LA trended to normal  - IR perc aspiration of disc, NPO. Reverse anti coagulation for procedure. Acute on Chronic Anemia  - Hgb 8.1 on admission, repeat 6.7  - baseline: previously around 11-12, trending down  - monitor H&H q8hr, IVF, protonix  - hold Coumadin  - Transfuse one unit of PRBC. Transaminitis  - AST 44 ->70  - etiology: unclear, poss 2/2 sepsis?   - repeat LFTs, hold statin    Chronically Elevated Troponin  - 0.17  - previously 0.77 in 12/2019    CAD s/p stents  - recent NSTEMI in 12/2019  - no c/o chest pain  - cont Brilinta, BB, hold statin as above    PAF  Complete Heart Block  - s/p dual chamber pacemaker  - AC on Coumadin -> hold for now & give 1 unit FFP    HTN  - stable  - cont BB    CKD stage III  - Cr 1.5 -> 1.8  - baseline: ~1.5  - stable, will monitor    HLD  - hold statin    Chronic Lymphedema  - supportive measures    DVT Prophylaxis: SCDs, hold coumadin for procedure  Diet: Diet NPO Effective Now  Code Status: Full Code    Jaz Singh PA-C 9:29 AM 6/16/2020     KAMERON RIVERA 6/16/2020 10:02 AM

## 2020-06-16 NOTE — CONSULTS
Pharmacy Note  Vancomycin Consult    Nisa Ortega is a 81 y.o. male started on Vancomycin for gram positive coverage of sepsis; consult received from Dr. Ervin Avelar to manage therapy. Also receiving the following antibiotics: Zosyn. Patient Active Problem List   Diagnosis    Status post hip replacement    [facility]    Atrial fibrillation (HCC)    DVT (deep venous thrombosis) ([facility]    Anticoagulated on Coumadin    CKD (chronic kidney disease)    Neoplasm of uncertain behavior of skin    Venous insufficiency    Lymphedema    Chronic ulcer of right leg, limited to breakdown of skin (HCC)    Chronic ulcer of left leg, limited to breakdown of skin ([facility]    Hyperkalemia    CKD (chronic kidney disease) stage 3, GFR 30-59 ml/min ([facility])    Unstable angina ([facility]    NSTEMI (non-ST elevated myocardial infarction) ([address]    CAD (coronary artery disease)    Obesity    Sinus pause    Cardiac arrhythmia    Pacemaker    Sepsis ([facility]       Allergies:  Sulfa antibiotics     Temp max: 102.9    Recent Labs     06/15/20  2042 06/16/20  0419   BUN 19 20       Recent Labs     06/15/20  2042 06/16/20  0419   CREATININE 1.5* 1.8*       Recent Labs     06/15/20  2042 06/16/20  0419   WBC 4.6 13.8*         Intake/Output Summary (Last 24 hours) at 6/16/2020 0506  Last data filed at 6/16/2020 0421  Gross per 24 hour   Intake 3321 ml   Output 250 ml   Net 3071 ml       Culture Date      Source                       Results      Ht Readings from Last 1 Encounters:   06/16/20 5' 10\" (1.778 m)        Wt Readings from Last 1 Encounters:   06/16/20 229 lb 4.8 oz (104 kg)         Body mass index is 32.9 kg/m². Estimated Creatinine Clearance: 37 mL/min (A) (based on SCr of 1.8 mg/dL (H)). Goal Trough Level: 15-19 mcg/mL    Assessment/Plan:  Will initiate Vancomycin with a one time loading dose of 2000 mg x1, followed by 1250 mg IV every 24 hours.   A vancomycin trough has been ordered for 6/18 @ 3012.  Thank you for the consult. Will continue to follow.

## 2020-06-16 NOTE — ED NOTES
Lab called to report lactic acid level of 4.8. Will relay to Dr. Jaswinder Jaquez and pt nurse, Dillon Tate.       Cullen Gomez RN  06/15/20 2129

## 2020-06-16 NOTE — CONSULTS
Last attempt to quit: 1970     Years since quittin.0    Smokeless tobacco: Former User     Types: Chew     Quit date: 1962   Substance Use Topics    Alcohol use: Yes     Alcohol/week: 2.0 standard drinks     Types: 2 Cans of beer per week    Drug use: No       ALLERGIES    Allergies   Allergen Reactions    Sulfa Antibiotics      Joint swelling       MEDICATIONS    No current facility-administered medications on file prior to encounter. Current Outpatient Medications on File Prior to Encounter   Medication Sig Dispense Refill    metoprolol succinate (TOPROL XL) 50 MG extended release tablet TAKE ONE TABLET BY MOUTH DAILY (Patient taking differently: 25 mg 2 times daily ) 90 tablet 2    sodium bicarbonate 650 MG tablet Take 1 tablet by mouth 3 times daily 90 tablet 1    atorvastatin (LIPITOR) 80 MG tablet Take 1 tablet by mouth nightly 30 tablet 1    furosemide (LASIX) 20 MG tablet Take 1 tablet by mouth every morning 60 tablet 0    ticagrelor (BRILINTA) 90 MG TABS tablet Take 1 tablet by mouth 2 times daily 60 tablet 1    hydrALAZINE (APRESOLINE) 50 MG tablet Take 1 tablet by mouth every 8 hours 90 tablet 1    warfarin (COUMADIN) 2 MG tablet Take 2 or 3 mg daily as directed by coumadin clinic. (Patient taking differently: Take 2 or 3 mg daily every other day) 90 tablet 3    warfarin (COUMADIN) 2 MG tablet Take 1 tablet by mouth daily Or as directed by coumadin clinic 90 tablet 3    potassium chloride (KLOR-CON M) 10 MEQ extended release tablet Take 1 tablet by mouth daily Take 2 tablets today and then one daily after.   Repeat blood work on Friday 30 tablet 0       Objective    /63   Pulse 73   Temp 97.9 °F (36.6 °C) (Axillary)   Resp 20   Ht 5' 10\" (1.778 m)   Wt 229 lb 4.8 oz (104 kg)   SpO2 92%   BMI 32.90 kg/m²     LABS:  WBC:    Lab Results   Component Value Date    WBC 13.8 2020     H/H:    Lab Results   Component Value Date    HGB 6.7 2020    HCT 21.5 06/16/2020     PTT:    Lab Results   Component Value Date    APTT 49.7 06/18/2012   [APTT}  PT/INR:    Lab Results   Component Value Date    PROTIME 23.4 06/16/2020    INR 2.00 06/16/2020     HgBA1c:    Lab Results   Component Value Date    LABA1C 5.3 05/17/2011       Assessment  Evolving deep tissue injury to the left buttock. BLE with chronic  thick, scaly skin, flaking off with moist pink skin beneath. Khai Risk Score: Khai Scale Score: 16    Patient Active Problem List   Diagnosis Code    Status post hip replacement Z96.649    CHF (congestive heart failure) ([facility]) I50.9    Atrial fibrillation ([facility]) I48.91    DVT (deep venous thrombosis) ([facility]) I82.409    Anticoagulated on Coumadin Z79.01    CKD (chronic kidney disease) N18.9    Neoplasm of uncertain behavior of skin D48.5    Venous insufficiency I87.2    Lymphedema I89.0    Chronic ulcer of right leg, limited to breakdown of skin ([facility] L97.911    Chronic ulcer of left leg, limited to breakdown of skin ([facility]) L97.921    Hyperkalemia E87.5    CKD (chronic kidney disease) stage 3, GFR 30-59 ml/min ([facility]) N18.3    Unstable angina ([facility]) I20.0    NSTEMI (non-ST elevated myocardial infarction) ([facility]) I21.4    CAD (coronary artery disease) I25.10    Obesity E66.9    Sinus pause I45.5    Cardiac arrhythmia I49.9    Pacemaker Z95.0    Septicemia ([facility]) A41.9    Acute midline low back pain without sciatica M54.5    Discitis of lumbar region M46.46    Lactic acidosis E87.2    Acute cystitis without hematuria N30.00    Colitis K52.9       Measurements:  Wound 06/15/20 Buttocks Upper;Left Deep Tissue Injury (Active)   Wound Image   6/16/2020 10:04 AM   Wound Pressure Stage  2 6/16/2020  2:33 AM   Dressing Status Clean;Dry; Intact 6/16/2020  7:23 AM   Dressing Changed Changed/New 6/16/2020  7:23 AM   Dressing/Treatment Foam 6/16/2020 10:04 AM   Wound Cleansed Rinsed/Irrigated with saline 6/16/2020 10:04 AM   Wound Length (cm) 1.8 cm 6/16/2020

## 2020-06-16 NOTE — FLOWSHEET NOTE
06/16/20 0134   Vital Signs   Temp 100.3 °F (37.9 °C)   Temp Source Oral   Pulse 87   Heart Rate Source Monitor   Resp 22   BP (!) 108/52   BP Location Left upper arm   BP Upper/Lower Upper   Patient Position Semi fowlers   Level of Consciousness 0   MEWS Score 2   Height and Weight   Height 5' 10\" (1.778 m)   Weight 229 lb 4.8 oz (104 kg)   Weight Method Bed scale   BSA (Calculated - sq m) 2.27 sq meters   BMI (Calculated) 33   Oxygen Therapy   SpO2 100 %   O2 Device Nasal cannula   O2 Flow Rate (L/min) 3 L/min   Pt arrived to room in stable condition. Admission questions completed. Will wait for pharmacy to verify meds.  Call light within reach bed alarm on

## 2020-06-16 NOTE — ED PROVIDER NOTES
mg/dL    Alkaline Phosphatase 270 (H) 40 - 129 U/L    ALT 10 10 - 40 U/L    AST 44 (H) 15 - 37 U/L    Globulin 3.8 g/dL   Lactic acid, plasma   Result Value Ref Range    Lactic Acid 4.8 (HH) 0.4 - 2.0 mmol/L   Urine, reflex to culture   Result Value Ref Range    Color, UA DK YELLOW Straw/Yellow    Clarity, UA SL CLOUDY (A) Clear    Glucose, Ur Negative Negative mg/dL    Bilirubin Urine SMALL (A) Negative    Ketones, Urine TRACE (A) Negative mg/dL    Specific Gravity, UA 1.010 1.005 - 1.030    Blood, Urine MODERATE (A) Negative    pH, UA 7.0 5.0 - 8.0    Protein,  (A) Negative mg/dL    Urobilinogen, Urine 1.0 <2.0 E.U./dL    Nitrite, Urine Negative Negative    Leukocyte Esterase, Urine SMALL (A) Negative    Microscopic Examination YES     Urine Type NotGiven     Urine Reflex to Culture Yes    Lipase   Result Value Ref Range    Lipase 35.0 13.0 - 60.0 U/L   Troponin   Result Value Ref Range    Troponin 0.17 (H) <0.01 ng/mL   Brain Natriuretic Peptide   Result Value Ref Range    Pro-BNP 2,454 (H) 0 - 449 pg/mL   Protime-INR   Result Value Ref Range    Protime 24.0 (H) 10.0 - 13.2 sec    INR 2.05 (H) 0.86 - 1.14   Microscopic Urinalysis   Result Value Ref Range    Mucus, UA 1+ (A) None Seen /LPF    WBC, UA 21-50 (A) 0 - 5 /HPF    RBC, UA 11-20 (A) 0 - 4 /HPF    Epithelial Cells, UA 0-1 0 - 5 /HPF    Bacteria, UA 1+ (A) None Seen /HPF   EKG 12 Lead   Result Value Ref Range    Ventricular Rate 136 BPM    Atrial Rate 136 BPM    QRS Duration 132 ms    Q-T Interval 344 ms    QTc Calculation (Bazett) 517 ms    R Axis 111 degrees    T Axis 10 degrees    Diagnosis       Wide QRS tachycardia with occasional Premature ventricular complexesRight bundle branch blockLeft posterior fascicular block Bifascicular block Possible Inferior infarct , age undeterminedAbnormal ECG     CT ABDOMEN PELVIS W IV CONTRAST Additional Contrast? None   Final Result   Stool throughout the colon including dense stool within a mildly distended rectosigmoid colon, showing adjacent fat stranding. Correlation for the   possibility of stercoral colitis is recommended. At least moderate endplate sclerosis and irregularity at L1-and L4-5, with   associated disc space widening. Correlation for the possibility of discitis   is recommended. MRI of the lumbar spine with contrast would be helpful if   indicated. XR CHEST PORTABLE   Final Result   Stable portable study. MDM:  On exam, patient is elderly and chronically ill in appearance. He is RRR. CTAB. Abdomen is soft, obese, non-tender. 2+ patellar and achilles tendon reflexes. Normal greater toe extension strength bilaterally. He has verrucious scaly skin to his bilateral lower extremities with some weeping from the left. No obvious erythema. Patient's workup reveals probable sepsis secondary to one of many possible sources. He has a UTI. He has colitis on imaging. He has possible discitis on CT. Hospitalist has accepted admission. Dr. Lavon Bonner started the sepsis bundle including antibiotics prior to me assuming care. Differential diagnosis included but was not limited to: abdominal aortic aneurysm, cauda equina syndrome, epidural mass lesion, spinal stenosis, herniated disk causing severe stenosis, sprain/strain, fracture, contusion, sciatica, UTI, pyelonephritis, kidney stone, kidney stone, , appendicitis, bowel obstruction, diverticulitis, hernia, gastritis/gastroenteritis, pancreatitis, cholecystitis, hepatitis, constipation, IBS, IBD. Final Impression:  1. Septicemia ([address]    2. Lactic acidosis    3. Acute cystitis without hematuria    4.  Colitis        (Please note that portions of this note may have been completed with a voice recognition program. Efforts were made to edit the dictations but occasionally words are mis-transcribed.)    Denzil Halsted, MD Denzil Halsted, MD  06/16/20 1051

## 2020-06-16 NOTE — PROGRESS NOTES
4 Eyes Skin Assessment     The patient is being assess for   Admission    I agree that 2 RN's have performed a thorough Head to Toe Skin Assessment on the patient. ALL assessment sites listed below have been assessed. Areas assessed by both nurses:   [x]   Head, Face, and Ears   [x]   Shoulders, Back, and Chest, Abdomen  [x]   Arms, Elbows, and Hands   [x]   Coccyx, Sacrum, and Ischium  [x]   Legs, Feet, and Heels        Scattered bruises/abrasions, Stage II on coccyx, bilateral lower extremities from knee down dry, scaly, alligator skin. **SHARE this note so that the co-signing nurse is able to place an eSignature**    Co-signer eSignature: Electronically signed by Jacque Bella RN on 6/16/20 at 2:46 AM EDT    Does the Patient have Skin Breakdown?   Yes LDA WOUND CARE was Initiated documentation include the Meron-wound, Wound Assessment, Measurements, Dressing Treatment, Drainage, and Color\",          Khai Prevention initiated:  Yes   Wound Care Orders initiated:  Yes      [id number]  nurse consulted for Pressure Injury (Stage 3,4, Unstageable, DTI, NWPT, Complex wounds)and New or Established Ostomies:  Yes      Primary Nurse eSignature: Electronically signed by Edmundo Neely RN on 6/16/20 at 2:45 AM EDT

## 2020-06-17 NOTE — PROGRESS NOTES
Nutrition Assessment    Type and Reason for Visit: Initial, Positive Nutrition Screen(Wounds)    Nutrition Recommendations:   1. General Diet   2. Ensure HP vanilla with all meals     Nutrition Assessment:   Pt. nutritionally compromised AEB pt was admitted with sepsis r/t overall debility report that he sits all day long d/ he has too much pain to move . At risk for further nutrition compromise r/t 2 stage 2 pressure areas on his buttocks . Will add ONS to each meal .    Malnutrition Assessment:  · Malnutrition Status: At risk for malnutrition  · Context:    · Findings of the 6 clinical characteristics of malnutrition (Minimum of 2 out of 6 clinical characteristics is required to make the diagnosis of moderate or severe Protein Calorie Malnutrition based on AND/ASPEN Guidelines):  1. Energy Intake-Less than or equal to 75% of estimated energy requirement, Greater than or equal to 7 days    2. Weight Loss-Unable to assess, unable to assess  3. Fat Loss-No significant subcutaneous fat loss, Orbital  4. Muscle Loss-No significant muscle mass loss, Temples (temporalis muscle)  5. Fluid Accumulation-Severe fluid accumulation(lymphodema they are wrapped and swollen ),    6.  Strength-NOT ASSESSED    Nutrition Risk Level:  Moderate    Nutrient Needs:  · Estimated Daily Total Kcal: 6856-8601 based ~ 15-18 kca/kg cbw   · Estimated Daily Protein (g): 106-121 based ~ 1.4-1.6 gr/kg ibw   · Estimated Daily Total Fluid (ml/day): 2347-5114     Nutrition Diagnosis:   · Problem: Increased nutrient needs(PROTEIN )  · Etiology: related to Increased demand for energy/nutrients     Signs and symptoms:  as evidenced by Presence of wounds    Objective Information:  · Nutrition-Focused Physical Findings: obese elderly male sitting up in Pressure relieving bed; eating dinner w/o any issues; hands and arms appear swollen; reports fair appetite; low h & h  · Wound Type: Pressure Ulcer, Stage II(2 on buttocks)  · Current Nutrition

## 2020-06-17 NOTE — PROGRESS NOTES
Progress Note    Admit Date:  6/15/2020    Subjective:  Mr. Vitor Bettencourt is more awake and alert today. Biopsy of the lumbar test could not be done yesterday as the INR was still elevated. Mentation is improved. No fever. Urine cultures positive for Proteus. Sensitivities are pending. Objective:   BP (!) 107/57   Pulse 61   Temp 97.9 °F (36.6 °C) (Oral)   Resp 16   Ht 5' 10\" (1.778 m)   Wt 238 lb 3.2 oz (108 kg)   SpO2 100%   BMI 34.18 kg/m²          Intake/Output Summary (Last 24 hours) at 6/17/2020 0839  Last data filed at 6/17/2020 0211  Gross per 24 hour   Intake 1566.1 ml   Output 150 ml   Net 1416.1 ml       Physical Exam:    Gen: Patient is much more awake and alert. Eyes: PERRL. No sclera icterus. No conjunctival injection. ENT: No discharge. Pharynx clear. Neck: Trachea midline. Normal thyroid. Resp: No accessory muscle use. No crackles. No wheezes. No rhonchi. No dullness on percussion. CV: Regular rate. Regular rhythm. No murmur or rub. + edema. GI: Non-tender. Non-distended. No masses. No organomegaly. Normal bowel sounds. No hernia. Skin: Chronic lymphedema with chronic ulceration of both legs  Lymph: No cervical LAD. No supraclavicular LAD. M/S: No cyanosis. No joint deformity. No clubbing. Patient has significant pain when he tries to sit up in his low back but is less than yesterday. Neuro: Sleepy. Reflexes diminished symmetric bilaterally. Moves all 4 extremities. Has some pain when he tries to raise his leg up  Psych: Oriented x 3. No anxiety or agitation.      Scheduled Meds:   sodium chloride  20 mL Intravenous Once    metoprolol succinate  50 mg Oral Daily    ticagrelor  90 mg Oral BID    sodium chloride flush  10 mL Intravenous 2 times per day    piperacillin-tazobactam  3.375 g Intravenous Q8H    vancomycin  1,500 mg Intravenous Q24H    docusate sodium  100 mg Oral Daily    pantoprazole  40 mg Intravenous Daily    polyethylene glycol  17 g Oral BID

## 2020-06-17 NOTE — PROGRESS NOTES
Yes  Rating:severe- Pt decreased after working with therapy and getting pain meds   Location:buttocks   Pain Medicine Status: Received pain med prior to tx      Cognition    A&O x4   Able to follow 2 step commands    Subjective  Patient lying supine in bed with no family present. Pt agreeable to this OT eval & tx. Upper Extremity ROM:    Bilateral shoulders limited to 90 degrees     Upper Extremity Strength:    4/5    - fair     Upper Extremity Sensation    WFL    Upper Extremity Proprioception:  WFL    Coordination and Tone  WFL    Balance  Functional Sitting Balance:  WFL  Functional Standing Balance:NT    Bed mobility:  Pt sat on the edge of the bed for 20 mins   Supine to sit:   Mod A   Sit to supine: Mod A   Rolling:    Not Tested  Scooting in sitting: Mod A   Scooting to head of bed:   Max A  and 2 persons    Bridging:   Not Tested    Transfers:  NT due to having procedure later today of disc aspiration of L1-L2 and L4-L5   Sit to stand:  Not Tested  Stand to sit:  Not Tested  Bed to chair:   Not Tested  Standard toilet: Not Tested  Bed to [facility]:  Not Tested    Dressing:      UE:   Not Tested  LE:    Max A     Bathing:    UE:  Not Tested  LE:  Not Tested    Eating:   Modified Independent    Toileting:  Not Tested    Activity Tolerance   Pt completed therapy session with Pain noted with supine to sit  SOB noted with movement   SP02 90-93%  Positioning Needs:   Pt in bed, no alarm needed, positioned in proper neutral alignment and pressure relief provided. Exercise / Activities Initiated:   Scapular retraction:  x10  Scapular protraction:  x10  Shoulder shrugs:  x10  Wrist ext/flex 10x  Hand flex/ect 10x   Patient/Family Education:   Role of OT    Assessment of Deficits: Pt seen for Occupational therapy evaluation in acute care setting. Pt demonstrated decreased Activity tolerance, ADLs, Balance , Bed mobility, ROM, Strength and Transfers.  Pt functioning below baseline and will likely benefit

## 2020-06-17 NOTE — PROGRESS NOTES
4th bag of FFP completed at 1815. Patient tolerated well; no s/s of adverse reactions noted.  Stat PT/INR ordered per MD.

## 2020-06-17 NOTE — PROGRESS NOTES
Occupational Therapy/ Physical Therapy  Attempted OT/PT eval and the pt is to get some plasma and the nurse ask therapy to hold the therapy eval at this time and attempt back later. Will attempt back later as time allows. Flo Timmons OTR/L [id number]  [facility], PT # [id number]      Addendum: 14:10  Attempted OT/PT eval and the pt is getting more plasma this afternoon and the pts nurse stated to continue to hold on therapy at this time. Will attempt eval again on 6-18-20 .   Flo Timmons OTR/L [id number]  [facility], PT # [id number]

## 2020-06-17 NOTE — FLOWSHEET NOTE
06/17/20 1024   Vital Signs   Temp 97.5 °F (36.4 °C)   Temp Source Oral   Pulse 60   Heart Rate Source Monitor   Resp 16   /62   BP Location Left upper arm   BP Upper/Lower Upper   Level of Consciousness 0   MEWS Score 1   Oxygen Therapy   SpO2 96 %   O2 Device None (Room air)   2nd bag of FFP initiated at 1032. Will monitor patient for 15 mins per policy.

## 2020-06-18 NOTE — PLAN OF CARE
Nutrition Problem: Increased nutrient needs(PROTEIN )  Intervention: Food and/or Nutrient Delivery: Continue current diet, Start ONS  Nutritional Goals: pt will accept and consume > 50% of meals and ensure HP with each meal and  weight will return admit weight ~ 229# and pressure areas will show signs of healing
Problem: Falls - Risk of:  Goal: Will remain free from falls  Description: Will remain free from falls  6/17/2020 0132 by Loren Guadarrama RN  Outcome: Ongoing  6/16/2020 1729 by Marie Patel RN  Outcome: Ongoing  Goal: Absence of physical injury  Description: Absence of physical injury  6/17/2020 0132 by Loren Guadarrama RN  Outcome: Ongoing  6/16/2020 1729 by Marie Patel RN  Outcome: Ongoing     Problem: OXYGENATION/RESPIRATORY FUNCTION  Goal: Patient will maintain patent airway  6/17/2020 0132 by Loren Guadarrama RN  Outcome: Ongoing  6/16/2020 1729 by Marie Patel RN  Outcome: Ongoing  Goal: Patient will achieve/maintain normal respiratory rate/effort  Description: Respiratory rate and effort will be within normal limits for the patient  6/17/2020 0132 by Loren Guadarrama RN  Outcome: Ongoing  6/16/2020 1729 by Marie Patel RN  Outcome: Ongoing     Problem: OXYGENATION/RESPIRATORY FUNCTION  Goal: Patient will achieve/maintain normal respiratory rate/effort  Description: Respiratory rate and effort will be within normal limits for the patient  6/17/2020 0132 by Loren Guadarrama RN  Outcome: Ongoing  6/16/2020 1729 by Marie Patel RN  Outcome: Ongoing     Problem: HEMODYNAMIC STATUS  Goal: Patient has stable vital signs and fluid balance  6/17/2020 0132 by Loren Guadarrama RN  Outcome: Ongoing  6/16/2020 1729 by Marie Patel RN  Outcome: Ongoing
Problem: Falls - Risk of:  Goal: Will remain free from falls  Description: Will remain free from falls  6/17/2020 1423 by Jenna Thacker RN  Outcome: Ongoing    Goal: Absence of physical injury  Description: Absence of physical injury  6/17/2020 1423 by Jenna Thacker RN  Outcome: Ongoing       Problem: OXYGENATION/RESPIRATORY FUNCTION  Goal: Patient will maintain patent airway  6/17/2020 1423 by Jenna Thacker RN  Outcome: Ongoing         Problem: HEMODYNAMIC STATUS  Goal: Patient has stable vital signs and fluid balance  6/17/2020 1423 by Jenna Thacker RN  Outcome: Ongoing       Problem: FLUID AND ELECTROLYTE IMBALANCE  Goal: Fluid and electrolyte balance are achieved/maintained  6/17/2020 1423 by Jenna Thacker RN  Outcome: Ongoing       Problem: ACTIVITY INTOLERANCE/IMPAIRED MOBILITY  Goal: Mobility/activity is maintained at optimum level for patient  6/17/2020 1423 by Jenna Thacker RN  Outcome: Ongoing
Problem: Falls - Risk of:  Goal: Will remain free from falls  Description: Will remain free from falls  Outcome: Ongoing     Problem: OXYGENATION/RESPIRATORY FUNCTION  Goal: Patient will maintain patent airway  Outcome: Ongoing  Goal: Patient will achieve/maintain normal respiratory rate/effort  Description: Respiratory rate and effort will be within normal limits for the patient  Outcome: Ongoing     Problem: FLUID AND ELECTROLYTE IMBALANCE  Goal: Fluid and electrolyte balance are achieved/maintained  Outcome: Ongoing     Problem: ACTIVITY INTOLERANCE/IMPAIRED MOBILITY  Goal: Mobility/activity is maintained at optimum level for patient  Outcome: Ongoing
Problem: Falls - Risk of:  Goal: Will remain free from falls  Description: Will remain free from falls  Outcome: Ongoing  Goal: Absence of physical injury  Description: Absence of physical injury  Outcome: Ongoing     Problem: OXYGENATION/RESPIRATORY FUNCTION  Goal: Patient will maintain patent airway  Outcome: Ongoing  Goal: Patient will achieve/maintain normal respiratory rate/effort  Description: Respiratory rate and effort will be within normal limits for the patient  Outcome: Ongoing     Problem: HEMODYNAMIC STATUS  Goal: Patient has stable vital signs and fluid balance  Outcome: Ongoing     Problem: FLUID AND ELECTROLYTE IMBALANCE  Goal: Fluid and electrolyte balance are achieved/maintained  Outcome: Ongoing     Problem: ACTIVITY INTOLERANCE/IMPAIRED MOBILITY  Goal: Mobility/activity is maintained at optimum level for patient  Outcome: Ongoing
Care:  Goal: Daily care needs are met  Description: Daily care needs are met  6/18/2020 1946 by Rohit Mckinney RN  Outcome: Ongoing  6/18/2020 1050 by Adam Felipe RN  Outcome: Ongoing     Problem: Pain:  Goal: Patient's pain/discomfort is manageable  Description: Patient's pain/discomfort is manageable  6/18/2020 1946 by Rohit Mckinney RN  Outcome: Ongoing  6/18/2020 1050 by Adam Felipe RN  Outcome: Ongoing     Problem: Skin Integrity:  Goal: Skin integrity will stabilize  Description: Skin integrity will stabilize  6/18/2020 1946 by Rohit Mckinney RN  Outcome: Ongoing  6/18/2020 1050 by Adam Felipe RN  Outcome: Ongoing     Problem: Discharge Planning:  Goal: Patients continuum of care needs are met  Description: Patients continuum of care needs are met  6/18/2020 1946 by Rohit Mckinney RN  Outcome: Ongoing  6/18/2020 1050 by Adam Felipe RN  Outcome: Ongoing
Daily Care:  Goal: Daily care needs are met  Description: Daily care needs are met  Outcome: Ongoing     Problem: Pain:  Goal: Patient's pain/discomfort is manageable  Description: Patient's pain/discomfort is manageable  Outcome: Ongoing     Problem: Skin Integrity:  Goal: Skin integrity will stabilize  Description: Skin integrity will stabilize  Outcome: Ongoing     Problem: Discharge Planning:  Goal: Patients continuum of care needs are met  Description: Patients continuum of care needs are met  Outcome: Ongoing

## 2020-06-18 NOTE — PROGRESS NOTES
Pt arrived for image guided disk aspiration. Procedure explained including the risk and benefits of the procedure. All questions answered. Pt verbalizes understanding of the procedure and states no more questions. Consent verified. Labs, allergies, medications, and code status reviewed. No contraindications noted. Pre Jyoti score 9.

## 2020-06-18 NOTE — PROGRESS NOTES
Progress Note    Admit Date:  6/15/2020    Subjective:  Mr. Denisse Sprague is more awake and alert today. Biopsy of the lumbar test could not be done yesterday as the INR was still elevated. Mentation is improved. No fever. Urine cultures positive for Proteus. Sensitivities are pending. Objective:   /68   Pulse 62   Temp 97 °F (36.1 °C) (Oral)   Resp 16   Ht 5' 10\" (1.778 m)   Wt 238 lb 3.2 oz (108 kg)   SpO2 94%   BMI 34.18 kg/m²          Intake/Output Summary (Last 24 hours) at 6/18/2020 0951  Last data filed at 6/18/2020 0458  Gross per 24 hour   Intake 3303.67 ml   Output 1150 ml   Net 2153.67 ml       Physical Exam:    Gen: Patient is much more awake and alert. Eyes: PERRL. No sclera icterus. No conjunctival injection. ENT: No discharge. Pharynx clear. Neck: Trachea midline. Normal thyroid. Resp: No accessory muscle use. No crackles. No wheezes. No rhonchi. No dullness on percussion. CV: Regular rate. Regular rhythm. No murmur or rub. + edema. GI: Non-tender. Non-distended. No masses. No organomegaly. Normal bowel sounds. No hernia. Skin: Chronic lymphedema with chronic ulceration of both legs  Lymph: No cervical LAD. No supraclavicular LAD. M/S: No cyanosis. No joint deformity. No clubbing. Patient has significant pain when he tries to sit up in his low back but is less than yesterday. Neuro: Sleepy. Reflexes diminished symmetric bilaterally. Moves all 4 extremities. Has some pain when he tries to raise his leg up  Psych: Oriented x 3. No anxiety or agitation.      Scheduled Meds:   vancomycin (VANCOCIN) intermittent dosing (placeholder)   Other RX Placeholder    cefTRIAXone (ROCEPHIN) IV  1 g Intravenous Q24H    metoprolol succinate  50 mg Oral Daily    ticagrelor  90 mg Oral BID    sodium chloride flush  10 mL Intravenous 2 times per day    docusate sodium  100 mg Oral Daily    pantoprazole  40 mg Intravenous Daily    polyethylene glycol  17 g Oral BID       Continuous stercoral colitis is recommended. At least moderate endplate sclerosis and irregularity at L1-and L4-5, with   associated disc space widening. Correlation for the possibility of discitis   is recommended. MRI of the lumbar spine with contrast would be helpful if   indicated. XR CHEST PORTABLE   Final Result   Stable portable study. 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 *      Plan:    Sepsis  Poss Discitis  Stercoral Colitis  UTI from Proteus. - with fevers, lactic acidosis (4.6), leukocytosis  - CT abdomen showed moderate endplate sclerosis & irregularity at L1 and L4-5 with associated disc space widening; also: stool throughout colon, dense stool w/in mildly distended rectosigmoid colon with adjacent fat stranding  - Admit to Med Surg  - trend LA, Vanc D#3 and Zosyn D#2/2, Rocephin Day#2, IVF, PRN morphine  - 1x mineral oil enema, BID miralax and [address] colace, PRN Senna  - LA trended to normal  - IR perc aspiration of disc, NPO. Reversed anti coagulation for procedure.     Acute on Chronic Anemia  - Hgb 8.1 on admission, repeat 6.7  - baseline: previously around 11-12, trending down  - monitor H&H q8hr, IVF, protonix  - hold Coumadin  - Transfused one unit of PRBC.     Transaminitis  - AST 44 ->70  - etiology: unclear, poss 2/2 sepsis? - repeat LFTs, hold statin.  Likely resume at discharge.      Chronically Elevated Troponin  - 0.17  - previously 0.77 in

## 2020-06-18 NOTE — PROGRESS NOTES
Image guided disk aspiration completed by Dr. Alyse Kwan. Approximately 7 ml of blood colored fluid obtained with tissue specimen. Specimens sent to lab for culture. Pt tolerated procedure without any signs or symptoms of distress. Vital signs stable see flow sheets. Post Jyoti 9. Report called to Sabine Short on PCU. Pt transported back to PCU via bed by transport in stable condition.

## 2020-06-19 NOTE — DISCHARGE INSTR - COC
Continuity of Care Form    Patient Name: Delilah Acuna   :  1935  MRN:  [id number]    Admit date:  6/15/2020  Discharge date:  2020    Code Status Order: Full Code   Advance Directives:   Advance Care Flowsheet Documentation     Date/Time Healthcare Directive Type of Healthcare Directive Copy in [address] Agent's Name Healthcare Agent's Phone Number    [phone]  Yes, patient has an advance directive for healthcare treatment  Durable power of  for health care  No, copy requested from family  --  --  --          Admitting Physician:  Faith Fried MD  PCP: Rhett Esparza MD    Discharging Nurse: Reynaldo Geronimo Unit/Room#: 7879/8697-06  Discharging Unit Phone Number: [phone]    Emergency Contact:   Extended Emergency Contact Information  Primary Emergency Contact: Madiha Walton  Address: [facility] [address] [place] [address], [address] [address] [place]  Phone: [phone]  Mobile Phone: [phone]  Relation: Spouse  Secondary Emergency Contact: [address] [place] [address] Way Phone: [phone]  Work Phone: [phone]  Relation: Child    Past Surgical History:  Past Surgical History:   Procedure Laterality Date    CARDIAC CATHETERIZATION  2019    Multi Vessel CAD, needs intervention    CARDIAC PACEMAKER PLACEMENT  2019    CHOLECYSTECTOMY      EYE SURGERY  13    Left Eye Cataract Removal    JOINT REPLACEMENT  11    right total hip arthroplasty    OTHER SURGICAL HISTORY  2017    excision of skin lesion chest and left neck    PACEMAKER PLACEMENT      PROSTATE SURGERY      PTCA  2019       Immunization History:   Immunization History   Administered Date(s) Administered    Influenza Virus Vaccine 2011, 2012    Pneumococcal Conjugate 7-valent (Rachna Barban) 2009       Active Problems:  Patient Active Problem List   Diagnosis Code    Status post hip replacement Z96.649    Clean;Dry; Intact 6/18/2020  9:12 PM   Dressing/Treatment Foam 6/18/2020  9:12 PM   Wound Cleansed Rinsed/Irrigated with saline 6/16/2020 10:04 AM   Dressing Change Due 06/22/20 6/18/2020  9:12 PM   Wound Length (cm) 1.8 cm 6/16/2020 10:04 AM   Wound Width (cm) 2.3 cm 6/16/2020 10:04 AM   Wound Depth (cm) 0.1 cm 6/16/2020 10:04 AM   Wound Surface Area (cm^2) 4.14 cm^2 6/16/2020 10:04 AM   Wound Volume (cm^3) 0.41 cm^3 6/16/2020 10:04 AM   Wound Assessment Light purple;Non-blanchable erythema 6/19/2020  3:11 AM   Odor None 6/16/2020 10:04 AM   Margins Attached edges; Defined edges 6/16/2020 10:04 AM   [place]%Wound Bed 40 6/16/2020 10:04 AM   Purple%Wound Bed 60 6/16/2020 10:04 AM   Number of days: 3       Wound 06/16/20 Buttocks Right;Left Stage 2 right buttock (Active)   Wound Pressure Stage  2 6/16/2020  8:00 PM   Dressing Status Clean;Dry; Intact 6/18/2020  9:12 PM   Dressing/Treatment Foam 6/18/2020  9:12 PM   Wound Cleansed Wound cleanser 6/19/2020  3:11 AM   Wound Assessment Drainage;Fragile;Pink;Slough; Yellow 6/19/2020  3:11 AM   Drainage Amount Scant 6/19/2020  3:11 AM   Drainage Description Serosanguinous 6/19/2020  3:11 AM   Meron-wound Assessment Blanchable erythema;Pink 6/19/2020  3:11 AM   [place]%Wound Bed 100% pink, partial thickness 6/16/2020 10:14 AM   Number of days: 3        Elimination:  Continence:   · Bowel: Yes and with occasional incontinence  · Bladder: No  Urinary Catheter: Insertion Date: 06-   Colostomy/Ileostomy/Ileal Conduit: No       Date of Last BM: 6/19/2020    Intake/Output Summary (Last 24 hours) at 6/19/2020 1308  Last data filed at 6/19/2020 0846  Gross per 24 hour   Intake 2951 ml   Output 1100 ml   Net 1851 ml     I/O last 3 completed shifts: In: 5978 [P.O.:600; I.V.:2231]  Out: 1100 [Urine:1100]    Safety Concerns:      At Risk for Falls    Impairments/Disabilities:      None    Nutrition Therapy:  Current Nutrition Therapy:   - Oral Diet:  General    Routes of Feeding:

## 2020-06-19 NOTE — DISCHARGE SUMMARY
Name:  Jenny Duenas  Room:  9256/5483-17  MRN:    [id number]    Discharge Summary      This discharge summary is in conjunction with a complete physical exam done on the day of discharge. Discharging Physician: Lv Alcantar MD       Admit: 6/15/2020  Discharge:  6/19/2020    HPI taken from admission H&P:      The patient is a 81 y.o. male with a PMH of CAD s/p PCI & NICKIE x 4 (NSTEMI in 12/2019), Ischemic CM, HTN, CKD stage III, PAF AC on Coumadin, hx of complete heart block s/p dual chamber pacemaker and chronic lymphedema who presented to the ED with complaint of increasing fatigue and decreased ambulatory status. Pt is a somewhat poor historian and reports that he came in because of his pancreas. He is oriented x 3. Denies any abdominal pain but when asked does complain of low back pain that has limited his mobility for quite some time. Per ER report - family was concerned as he has been able to ambulate freely in the past but has become increasingly more bed bound. He did express pain in low hips and back. Denied any constipation or blood in his stool. Work up in the ED did show pt had a fever, lactic acidosis and CT of the abdomen concerning for poss discitis as well as a large stool burden with inflammation concerning for stercoral colitis. Pt was admitted to Med Surg for further work up and management.       Diagnoses this Admission and Hospital Course     Sepsis - Resolved  Poss Discitis  Stercoral Colitis resolved. UTI from Proteus.   - with fevers, lactic acidosis (4.6), leukocytosis  - CT abdomen showed moderate endplate sclerosis & irregularity at L1 and L4-5 with associated disc space widening; also: stool throughout colon, dense stool w/in mildly distended rectosigmoid colon with adjacent fat stranding  - Admitted to Med Surg  - LA - trended to normal, Vanc D#3 and Zosyn D#2/2, Rocephin Day#2, IVF, PRN morphine  - 1x mineral oil enema, BID miralax and [address] colace, PRN Senna  - LA trended to normal  - IR perc aspiration of disc done and cultures are pending. Yield is likely low as he was on antibiotics for several days. Checked ESR: 111. Placed PICC line. Treat empirically with Vancomycin and Rocephin for a total of 6 weeks. PRN pain meds    UTI  - urine cx w/ proteus  - abx as above at d/c     Acute on Chronic Anemia  - Hgb 8.1 on admission, repeat 6.7  - baseline: previously around 11-12, trending down  - monitored H&H q8hr, IVF, protonix  - held Coumadin  - Transfused one unit of PRBC. - repeat Hgb on d/c 7.9     Transaminitis  - AST 44 ->70  - etiology: unclear, poss 2/2 sepsis? - repeated LFTs, held statin. Likely resume at discharge. - trended down, resumed statin at d/c     Chronically Elevated Troponin  - 0.17  - previously 0.77 in 12/2019     CAD s/p stents  - recent NSTEMI in 12/2019  - no c/o chest pain  - cont'd Brilinta, BB, held statin as above -> resumed     PAF  Complete Heart Block  - s/p dual chamber pacemaker  - given 2 more units of FFP. rechecked INR - 1.21     HTN  - stable  - cont'd BB     CKD stage III  - Cr 1.5 -> 1.7  - baseline: ~1.5  - stable, monitored     HLD  - held statin -> resumed     Chronic Lymphedema  - supportive measures    Procedures (Please Review Full Report for Details)  L1/2 disc core biopsy and aspiration for discitis (Not L2/3)    Consults    Wound Care    Physical Exam at Discharge:    /64   Pulse 64   Temp 98.2 °F (36.8 °C) (Oral)   Resp 17   Ht 5' 10\" (1.778 m)   Wt 238 lb 3.2 oz (108 kg)   SpO2 97%   BMI 34.18 kg/m²   Gen: Patient is much more awake and alert. Eyes: PERRL. No sclera icterus. No conjunctival injection. ENT: No discharge. Pharynx clear. Neck: Trachea midline. Normal thyroid. Resp: No accessory muscle use. No crackles. No wheezes. No rhonchi. No dullness on percussion. CV: Regular rate. Regular rhythm. No murmur or rub. + edema. GI: Non-tender. Non-distended. No masses. No organomegaly. Normal bowel sounds.  No hernia. Skin: Chronic lymphedema with chronic ulceration of both legs  Lymph: No cervical LAD. No supraclavicular LAD. M/S: No cyanosis. No joint deformity. No clubbing.  Patient has decreased pain pain when he tries to sit up in his low back  Neuro:  Alert. Reflexes diminished symmetric bilaterally. Moves all 4 extremities.    He has less pain when he tries to raise his leg  Psych: Oriented x 3. No anxiety or agitation. CBC:   Recent Labs     06/17/20  0617  06/18/20  0246  06/18/20  1721 06/19/20  0127 06/19/20  0815   WBC 9.9  --  7.3  --   --   --  5.5   HGB 7.5*  7.4*   < > 7.3*   < > 8.0* 7.9* 7.9*   HCT 23.2*  23.1*   < > 23.0*   < > 25.3* 25.0* 25.2*   MCV 86.8  --  86.8  --   --   --  85.0   *  --  143  --   --   --  132*    < > = values in this interval not displayed.      BMP:   Recent Labs     06/17/20  0617 06/18/20  0246 06/19/20  0814    136 138   K 3.9 3.6 3.6    105 106   CO2 22 21 21   BUN 29* 27* 22*   CREATININE 1.8* 1.7* 1.3     LIVER PROFILE:   Recent Labs     06/17/20  0617 06/18/20  0246 06/19/20  0814   AST 94* 65* 48*   ALT 17 16 16   BILITOT 1.3* 0.8 0.8   ALKPHOS 201* 221* 276*     PT/INR:   Recent Labs     06/17/20  1851 06/18/20  0247 06/19/20  0814   PROTIME 18.1* 16.2* 14.1*   INR 1.55* 1.39* 1.21*     CULTURES    Anaerobic/Aerobic cx: 4+ RBCs, 2+ WBCs - poly, no organism seen, NGTD    Urine cx: Proteus mirabilis >100,000 CFU/mL    Susceptibility     Proteus mirabilis (1)     Antibiotic Interpretation ANNA Status    ampicillin Sensitive <=8 mcg/mL     ceFAZolin Sensitive <=2 mcg/mL     cefepime Sensitive <=2 mcg/mL     cefTRIAXone Sensitive <=1 mcg/mL     cefuroxime Sensitive <=4 mcg/mL     ciprofloxacin Sensitive <=1 mcg/mL     ertapenem Sensitive <=0.5 mcg/mL     gentamicin Sensitive <=4 mcg/mL     meropenem Sensitive <=1 mcg/mL     nitrofurantoin Resistant >64 mcg/mL     piperacillin-tazobactam Sensitive <=16 mcg/mL     trimethoprim-sulfamethoxazole Sensitive <=2/38 mcg/mL         Blood cx x2: NGTD     RADIOLOGY    IR PICC WO SQ PORT/PUMP > 5 YEARS 6/19/2020   Final Result   Successful placement of PICC line. IR PERC ASPIRATION INTERVERT DISC 6/18/2020   Final Result   Successful core biopsy and aspirate of L1-2 disc with fluoroscopic and ex per   CT guidance. CT ABDOMEN PELVIS W IV CONTRAST Additional Contrast? None 6/15/2020   Final Result   Stool throughout the colon including dense stool within a mildly distended   rectosigmoid colon, showing adjacent fat stranding. Correlation for the   possibility of stercoral colitis is recommended. At least moderate endplate sclerosis and irregularity at L1-and L4-5, with   associated disc space widening. Correlation for the possibility of discitis   is recommended. MRI of the lumbar spine with contrast would be helpful if   indicated. XR CHEST PORTABLE 6/15/2020   Final Result   Stable portable study. Discharge Medications     Medication List      START taking these medications    cefTRIAXone  infusion  Commonly known as:  ROCEPHIN  Infuse 1,000 mg intravenously every 24 hours Compound per protocol     HYDROcodone-acetaminophen 5-325 MG per tablet  Commonly known as:  Norco  Take 1 tablet by mouth every 4 hours as needed for Pain for up to 3 days. Intended supply: 3 days. Take lowest dose possible to manage pain     vancomycin  infusion  Commonly known as:  VANCOCIN  Infuse 1,500 mg intravenously every 36 hours Compound per protocol. CHANGE how you take these medications    metoprolol succinate 50 MG extended release tablet  Commonly known as:  TOPROL XL  TAKE ONE TABLET BY MOUTH DAILY  What changed:  See the new instructions. warfarin 2 MG tablet  Commonly known as:  COUMADIN  Take as directed. If you are unsure how to take this medication, talk to your nurse or doctor. Original instructions: Take 2 or 3 mg daily as directed by coumadin clinic.   What changed: · additional instructions  · Another medication with the same name was removed. Continue taking this medication, and follow the directions you see here. CONTINUE taking these medications    atorvastatin 80 MG tablet  Commonly known as:  LIPITOR  Take 1 tablet by mouth nightly     furosemide 20 MG tablet  Commonly known as:  LASIX  Take 1 tablet by mouth every morning     hydrALAZINE 50 MG tablet  Commonly known as:  APRESOLINE  Take 1 tablet by mouth every 8 hours     potassium chloride 10 MEQ extended release tablet  Commonly known as:  KLOR-CON M  Take 1 tablet by mouth daily Take 2 tablets today and then one daily after. Repeat blood work on Friday     sodium bicarbonate 650 MG tablet  Take 1 tablet by mouth 3 times daily     ticagrelor 90 MG Tabs tablet  Commonly known as:  BRILINTA  Take 1 tablet by mouth 2 times daily           Where to Get Your Medications      You can get these medications from any pharmacy    Bring a paper prescription for each of these medications  · cefTRIAXone  infusion  · HYDROcodone-acetaminophen 5-325 MG per tablet  · vancomycin  infusion           Discharged in stable condition toSNF    Follow Up:   Follow up with physician at [facility]        MORE THAN [address] [id number] [address] 6/19/2020 6:17 PM

## 2020-06-19 NOTE — ADT AUTH CERT
Sepsis and Other Febrile Illness, without Focal Infection - Care Day 2 (6/17/2020) by Anisha Low RN         Review Status Review Entered   Completed 6/18/2020 12:52       Criteria Review      Care Day: 2 Care Date: 6/17/2020 Level of Care:    Guideline Day 2    Level Of Care    (X) ICU or floor    Clinical Status    (X) * Hemodynamic stability    6/18/2020 12:52 PM EDT by Renetta Heranndez      6/17    98.1  16 68  140/67  94%ra    creat 1.8  hgb  7.4,  8.1    (X) * Hypoxemia absent    6/18/2020 12:52 PM EDT by Renetta Hernandez      94%ra    (X) * Tachypnea absent    6/18/2020 12:52 PM EDT by Renetta Hernandez      16    Activity    (X) Activity as tolerated    Routes    (X) Possible IV fluids    6/18/2020 12:52 PM EDT by Daina Chacon iv given, protonix iv qd  ivf @ 125 cc/hr    (X) Parenteral or oral medications    (X) Diet as tolerated    6/18/2020 12:52 PM EDT by Renetta Hernandez      general, npo mn    Interventions    (X) WBC    6/18/2020 12:52 PM EDT by Renetta Hernandez      9.9    Medications    (X) Possible antimicrobial treatment    6/18/2020 12:52 PM EDT by Renetta Hernandez      rocephin iv q24hrs, zosyn iv q8hrs, vanco 2,500mg iv given, vanco 1.5mg iv q24hrs    ( ) Possible DVT prophylaxis    6/18/2020 12:52 PM EDT by Renetta Hernandez      inr 2.20,  1.55   2 units FFB given   coumadin on hold for IR procedure possible discitis for biopsy 6/18    * Milestone   Additional Notes   6/17   Per Hospitalist:   more awake and alert today.  Biopsy of the lumbar test could not be done yesterday as the INR was still elevated.  Mentation is improved.  No fever.  Urine cultures positive for Proteus.  Sensitivities are pending.       2 more units of FFP.  Will recheck INR      rocephin iv q24hrs, zosyn iv q8hrs, vanco 2,500mg iv given, vanco 1.5mg iv q24hrs

## 2020-06-19 NOTE — PROGRESS NOTES
Pt in bed resting comfortably and appears in no distress. Pt denies any needs at this time. Will continue to monitor. Call light within reach. Bedside report given to Nelda Pacheco RN for transfer of care.

## 2020-06-19 NOTE — PROGRESS NOTES
nitrofurantoin Resistant >64 mcg/mL     piperacillin-tazobactam Sensitive <=16 mcg/mL     trimethoprim-sulfamethoxazole Sensitive <=2/38 mcg/mL           IR PERC ASPIRATION INTERVERT DISC   Final Result   Successful core biopsy and aspirate of L1-2 disc with fluoroscopic and ex per   CT guidance. CT ABDOMEN PELVIS W IV CONTRAST Additional Contrast? None   Final Result   Stool throughout the colon including dense stool within a mildly distended   rectosigmoid colon, showing adjacent fat stranding. Correlation for the   possibility of stercoral colitis is recommended. At least moderate endplate sclerosis and irregularity at L1-and L4-5, with   associated disc space widening. Correlation for the possibility of discitis   is recommended. MRI of the lumbar spine with contrast would be helpful if   indicated. XR CHEST PORTABLE   Final Result   Stable portable study. 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 *      Plan:    Sepsis  Poss Discitis  Stercoral Colitis resolved. UTI from Proteus.   - with fevers, lactic acidosis (4.6), leukocytosis  - CT abdomen showed moderate endplate sclerosis & irregularity at L1 and L4-5 with associated disc space widening; also: stool throughout colon, dense stool w/in mildly distended rectosigmoid colon with adjacent fat stranding  - Admit to Med Surg  - trend LA, Vanc D#3 and Zosyn D#2/2, Rocephin Day#2, IVF, PRN morphine  - 1x mineral oil enema, BID miralax and [address] colace, PRN Senna  - LA trended to normal  - IR perc aspiration of disc done and cultures are pending. Yield is likely low as he was on antibiotics for several days. Check ESR. Place PICC line. Treat empirically with Vancomycin and Rocephin for a total of 6 weeks. Acute on Chronic Anemia  - Hgb 8.1 on admission, repeat 6.7  - baseline: previously around 11-12, trending down  - monitor H&H q8hr, IVF, protonix  - hold Coumadin  - Transfused one unit of PRBC.     Transaminitis  - AST 44 ->70  - etiology: unclear, poss 2/2 sepsis? - repeat LFTs, hold statin. Likely resume at discharge.      Chronically Elevated Troponin  - 0.17  - previously 0.77 in 12/2019     CAD s/p stents  - recent NSTEMI in 12/2019  - no c/o chest pain  - cont Brilinta, BB, hold statin as above     PAF  Complete Heart Block  - s/p dual chamber pacemaker  - will give 2 more units of FFP.   Will recheck INR     HTN  - stable  - cont BB     CKD stage III  - Cr 1.5 -> 1.7  - baseline: ~1.5  - stable, will monitor     HLD  - hold statin     Chronic Lymphedema  - supportive measures      Discharge planning to SNF.     DVT Prophylaxis: SCDs, hold coumadin for procedure  Diet: Diet NPO Effective Now  Code Status: Full Code       Nas Tee MD 6/19/2020 9:25 AM

## 2020-06-19 NOTE — PROGRESS NOTES
Very long discussion with pt regarding skin breakdown. Pt state that he understands the need for repositioning. Pt continues to move very little.

## 2020-07-29 NOTE — TELEPHONE ENCOUNTER
I do not know this patient and did not speak to anyone at [facility] about him    I see that on [address] outpatient referral to me was suggested and I can see him in office consult, but cannot make any recommendations about duration or adequacy of therapy at this time    thanks

## 2020-07-30 NOTE — TELEPHONE ENCOUNTER
Labs being routed to me but I am not the ordering or responsible physician     I am not sure who is the responsible provider    thanks

## 2020-08-19 PROBLEM — K92.2 GASTROINTESTINAL HEMORRHAGE: Status: ACTIVE | Noted: 2020-01-01

## 2020-08-19 NOTE — ED NOTES
Pt Lactic normal at 1.2, additional Lactic cancelled per Sepsis protocol.      Fatoumata Lafleur RN  08/19/20 7099

## 2020-08-19 NOTE — ED PROVIDER NOTES
in edema wraps          Psychiatric:  Normal mood      EKG: Atrial fibrillation with rapid ventricular response and right bundle branch block at a rate of 113 bpm.  No ST elevation    MDM: Patient presents emergency department today for concern of worsening anemia. Here, hemoglobin 8.1 which is actually higher than multiple recent laboratory studies. He was found to have guaiac positive stool with a supratherapeutic INR. Was given Protonix for upper GI bleed does not require emergent transfusion at this time. Due to his supratherapeutic INR with concern of active acute hemorrhage she was also given Kcentra especially with underlying history of CHF. Additionally, the patient was found to have significant acute renal failure. Chest x-ray concerning for some signs of fluid overload the patient's BNP and troponin are elevated but troponin actually lower than most recent testing. Patient certainly shows some signs of fluid overload and CHF with poor renal perfusion certainly could be the etiology to his underlying renal disease, but also he reportedly has had decreased food and water intake of late. Difficult to determine if the patient is dehydrated or needs diuresis given his underlying comorbidities. Nephrology was consulted but at this time no fluids or Lasix were administered. Will await their recommendations. Addendum 3040: Spoke with nephrology who recommended IV fluids at a rate of 75 mL's per hour. Will reevaluate in the morning. CRITICAL CARE TIME   Total Critical Care time was 30 minutes, excluding separately reportable procedures. There was a high probability of clinically significant/life threatening deterioration in the patient's condition which required my urgent intervention.   This time was spent reviewing the patient chart, interpreting diagnostic/laboratory data, administering Kcentra for emergent reversal of supratherapeutic INR given history of upper GI bleed with active bleeding        During the patient's ED course, the patient was given:  Medications   pantoprazole (PROTONIX) injection 40 mg (has no administration in time range)   prothrombin complex concentrate (human) (KCENTRA) infusion 1,000 Units (has no administration in time range)   0.9 % sodium chloride infusion (has no administration in time range)   phytonadione (ADULT) (VITAMIN K) 10 mg in dextrose 5 % 100 mL IVPB (has no administration in time range)   pantoprazole (PROTONIX) injection 40 mg (40 mg Intravenous Given 8/19/20 1523)        CLINICAL IMPRESSION  1. UGIB (upper gastrointestinal bleed)    2. Elevated INR    3. Acute renal failure, unspecified acute renal failure type ([address]    4. Chronic anemia        DISPOSITION  Admission      This chart was created using Dragon dictation software. Efforts were made by me to ensure accuracy, however some errors may be present due to limitations of this technology.             Steve Silva MD  08/19/20 [address], [place]  08/19/20 5427

## 2020-08-19 NOTE — TELEPHONE ENCOUNTER
Received call to cancel new patient appointment for tomorrow. Has a GI bleed and is being referred to ED. They will call back to reschedule once he is back from the ED.

## 2020-08-19 NOTE — PLAN OF CARE
Upper GI Bleed  Supra therapeutic INR- 8.7   Atrial fib  ANGEL on CKD     ICU    Brijesh Sosa PA-C  8/19/2020 4:43 PM

## 2020-08-19 NOTE — CONSULTS
Patient is being seen at the request of Sarah Chandra  for a consultation for GI bleed, AKF, pulmonary edema    HISTORY OF PRESENT ILLNESS: 80 yo male nursing home resident at [facility] with a history of CHF, chronic kidney failure and chronic anticoagulation with Coumadin who had an abnormal lab with a hemoglobin of 7.5 on 8/17/2020 and has had decreased p.o. intake for the last several days. In the emergency department he was noted to have black tarry stools, his INR is 8, with a -105. The ED reversed his anticoagulation with Kcentra and vitamin K. Patient was also noted to have an increase in creatinine, up to 4 from 1.4. PAST MEDICAL HISTORY:  Past Medical History:   Diagnosis Date    Arthritis     Atrial flutter with rapid ventricular response ([address]     Blood transfusion     CAD (coronary artery disease) 12/2019    NSTEMI, + Troponins    Cellulitis 9/19/2012    Cellulitis of right anterior lower leg 5/10/2018    CHF (congestive heart failure) ([address]     CHF exacerbation (HCC)     DVT (deep venous thrombosis) (HCC)     right leg    Gout     Hard of hearing 2019    wears hearing aids    Hyperlipidemia     Hypertension     Irregular heart beat     Prostate cancer ([address]     kaycee score 4+3     PAST SURGICAL HISTORY:  Past Surgical History:   Procedure Laterality Date    CARDIAC CATHETERIZATION  12/17/2019    Multi Vessel CAD, needs intervention    CARDIAC PACEMAKER PLACEMENT  12/28/2019    CHOLECYSTECTOMY      EYE SURGERY  2/26/13    Left Eye Cataract Removal    JOINT REPLACEMENT  05/16/11    right total hip arthroplasty    OTHER SURGICAL HISTORY  05/24/2017    excision of skin lesion chest and left neck    PACEMAKER PLACEMENT      PROSTATE SURGERY      PTCA  12/27/2019       FAMILY HISTORY:  family history includes Arthritis in his sister; Asthma in his brother; Heart Disease in his mother. SOCIAL HISTORY:   reports that he quit smoking about 50 years ago.  His smoking use included pipe. He quit smokeless tobacco use about 58 years ago. His smokeless tobacco use included chew. Scheduled Meds:   pantoprazole  40 mg Intravenous Once    prothrombin complex concentrate (human)  1,000 Units Intravenous Once    phytonadione (VITAMIN K)  IVPB  10 mg Intravenous Once     Continuous Infusions:    PRN Meds:      ALLERGIES:  Patient is allergic to sulfa antibiotics. REVIEW OF SYSTEMS:  Unable to obtain because the patient is poorly communicative     PHYSICAL EXAM:  Blood pressure 111/78, pulse 110, temperature 97.5 °F (36.4 °C), temperature source Temporal, resp. rate 19, SpO2 97 %.' on RA  Gen: Appears chronically unwell  Eyes: PERRL. No sclera icterus. No conjunctival injection. ENT: No discharge. Pharynx clear. Neck: Trachea midline. No obvious mass. Resp: No accessory muscle use. No crackles. No wheezes. No rhonchi. No dullness on percussion. CV: Regular rate. Regular rhythm. No murmur or rub. +++ edema. Peripheral pulses are 2+. Capillary refill is less than 3 seconds. GI: Non-tender. Non-distended. No hernia. Skin: Warm and dry. No nodule on exposed extremities. Lymph: No cervical LAD. No supraclavicular LAD. M/S: No cyanosis. No joint deformity. No clubbing. Neuro: Awake. Very [place]. Not clearly oriented. Psych: Unable to obtain because the patient is non-communicative .      LABS:  CBC:   Recent Labs     08/19/20  1410   WBC 8.9   HGB 8.1*   HCT 26.0*   MCV 87.2   *     BMP:   Recent Labs     08/19/20  1410      K 3.3*   CL 98*   CO2 22   BUN 62*   CREATININE 4.0*     LIVER PROFILE:   Recent Labs     08/19/20  1410   AST 27   ALT 10   LIPASE 36.0   BILITOT 0.6   ALKPHOS 149*     PT/INR:   Recent Labs     08/19/20  1410   PROTIME 103.5*   INR 8.73*     APTT:   Recent Labs     08/19/20  1410   APTT 63.5*     UA:  Recent Labs     08/19/20  1610   COLORU Yellow   PHUR 5.0   WBCUA 21-50*   RBCUA 3-4   YEAST Present*   BACTERIA 4+*   CLARITYU SL CLOUDY*   SPECGRAV 1.025   LEUKOCYTESUR SMALL*   UROBILINOGEN 0.2   BILIRUBINUR SMALL*   BLOODU Negative   GLUCOSEU Negative     No results for input(s): PHART, [id number], PO2ART in the last 72 hours.   ECG was reviewed by me and shows a fib @ 110 with RBBB without acute ischemic ST segment elevation   Chest imaging was reviewed by me and showed   8/19/2020 CXR bilateral pleural effusions with pulmonary vascular congestion, markedly worsened since June chest x-ray    ASSESSMENT:  · Acute GI Bleed - black tarry stool in ED  · H/O stercoral colitis  · Acute kidney failure, baseline creatinine 1.4  · Paroxysmal atrial fibrillation, now with RVR  · Acute on chronic pulmonary edema  · Chronic anemia, last Hgb in July was 8.2  · Coagulopathy with INR of 8.73  · Bilateral pleural effusions  · Possible UTI  · CAD s/p PCI with NICKIE   · Ischemic cardiomyopathy with EF of 40 to 45% December 2019  · H/O CHB s/p dual-chamber PPM  · Chronic kidney disease stage III  · Hypertension  · Chronic lymphedema    PLAN:  · Agree with reversal of anticoagulation given concern for GI bleed  · Hold antiplatelet agents  · Gastroenterology has seen and is planning EGD tomorrow  Ensure adequate IV access  Follow serial hemoglobin; transfuse to keep hemoglobin greater than 7  Protonix  · Nephrology has recommended gentle IV fluids  · Prophylaxis: SCD, Bactroban

## 2020-08-19 NOTE — ED PROVIDER NOTES
[address]        Pt Name: Jessica Wilcox  MRN: [id number]  [place] 1935  Date of evaluation: 8/19/2020  Provider: Valente Yang PA-C  PCP: Morelia Dowd MD    Shared Visit or Autonomous Visit:  I have seen and evaluated this patient with my supervising physician Sean Hopkins MD.    [address] [place] [address]       Chief Complaint   Patient presents with    Abnormal Lab     [facility], Hgb 7.5 on 8/17. Per report, pt is uncooperative w/ care, isn't eating and drinking. Pt is A&O during triage, DNR. HISTORY OF PRESENT ILLNESS   (Location/Symptom, Timing/Onset, Context/Setting, Quality, Duration, Modifying Factors, Severity)  Note limiting factors. Jessica Wilcox is a 81 y.o. male presenting to ER from [facility] for abnormal lab Hgb 7.5 on 8/17. Patient states he's had diarrhea off and on. Recent admit 6/2020 for colitis, UTI, septicemia and lumbar discitis. Patient c/o low back pain. States he's had a little cough. No fever. Denies any chest pain. States he's had shortness of breath. Denies any abdominal pain. He is on coumadin. Black stool diarrhea here. No vomiting. Reported he hasn't been eating or drinking. Patient is awake and alert. Follows my commands. Moving all extremities. Oriented to self and time. Unable to tell me where he is at. The history is provided by the patient and medical records. Diarrhea   Onset quality:  Unable to specify  Associated symptoms: cough    Associated symptoms: no abdominal pain, no fever and no vomiting    Shortness of Breath   Chronicity:  New  Associated symptoms: no abdominal pain, no chest pain, no fever, no syncope and no vomiting          Nursing Notes were reviewed    REVIEW OF SYSTEMS    (2-9 systems for level 4, 10 or more for level 5)     Review of Systems   Constitutional: Negative for fever. Respiratory: Positive for shortness of breath. Cardiovascular: Positive for leg swelling. Negative for chest pain and syncope. Gastrointestinal: Positive for diarrhea. Negative for abdominal pain and vomiting. All other systems reviewed and are negative. Positives and Pertinent negatives as per HPI. PAST MEDICAL HISTORY     Past Medical History:   Diagnosis Date    Arthritis     Atrial flutter with rapid ventricular response ([address]     Blood transfusion     CAD (coronary artery disease) 12/2019    NSTEMI, + Troponins    Cellulitis 9/19/2012    Cellulitis of right anterior lower leg 5/10/2018    CHF (congestive heart failure) ([address]     CHF exacerbation (HCC)     D[facility]) [facility])     right leg    Gout     Hard of hearing 2019    wears hearing aids    Hyperlipidemia     Hypertension     Irregular heart beat     Prostate cancer ([facility]     kaycee score 4+3         SURGICAL HISTORY     Past Surgical History:   Procedure Laterality Date    CARDIAC CATHETERIZATION  12/17/2019    Multi Vessel CAD, needs intervention    CARDIAC PACEMAKER PLACEMENT  12/28/2019    CHOLECYSTECTOMY      EYE SURGERY  2/26/13    Left Eye Cataract Removal    JOINT REPLACEMENT  05/16/11    right total hip arthroplasty    OTHER SURGICAL HISTORY  05/24/2017    excision of skin lesion chest and left neck    PACEMAKER PLACEMENT      PROSTATE SURGERY      PTCA  12/27/2019         CURRENTMEDICATIONS       Current Discharge Medication List      CONTINUE these medications which have NOT CHANGED    Details   metoprolol succinate (TOPROL XL) 50 MG extended release tablet TAKE ONE TABLET BY MOUTH DAILY  Qty: 90 tablet, Refills: 2      potassium chloride (KLOR-CON M) 10 MEQ extended release tablet Take 1 tablet by mouth daily Take 2 tablets today and then one daily after.   Repeat blood work on Friday  Qty: 30 tablet, Refills: 0      sodium bicarbonate 650 MG tablet Take 1 tablet by mouth 3 times daily  Qty: 90 tablet, Refills: 1      atorvastatin (LIPITOR) 80 MG tablet Take 1 tablet by mouth nightly  Qty: 30 tablet, Refills: 1      furosemide (LASIX) 20 MG tablet Take 1 tablet by mouth every morning  Qty: 60 tablet, Refills: 0      ticagrelor (BRILINTA) 90 MG TABS tablet Take 1 tablet by mouth 2 times daily  Qty: 60 tablet, Refills: 1      hydrALAZINE (APRESOLINE) 50 MG tablet Take 1 tablet by mouth every 8 hours  Qty: 90 tablet, Refills: 1      warfarin (COUMADIN) 2 MG tablet Take 2 or 3 mg daily as directed by coumadin clinic. Qty: 90 tablet, Refills: 3    Comments: Pt uses both 2 mg and 3 mg tablets. ALLERGIES     Sulfa antibiotics    FAMILYHISTORY       Family History   Problem Relation Age of Onset    Heart Disease Mother     Arthritis Sister     Asthma Brother           SOCIAL HISTORY       Social History     Socioeconomic History    Marital status:      Spouse name: None    Number of children: None    Years of education: None    Highest education level: None   Occupational History    None   Social Needs    Financial resource strain: None    Food insecurity     Worry: None     Inability: None    Transportation needs     Medical: None     Non-medical: None   Tobacco Use    Smoking status: Former Smoker     Types: Pipe     Last attempt to quit: 1970     Years since quittin.2    Smokeless tobacco: Former User     Types: [address] date: 1962   Substance and Sexual Activity    Alcohol use:  Yes     Alcohol/week: 2.0 standard drinks     Types: 2 Cans of beer per week    Drug use: No    Sexual activity: Not Currently     Partners: Female   Lifestyle    Physical activity     Days per week: None     Minutes per session: None    Stress: None   Relationships    Social connections     Talks on phone: None     Gets together: None     Attends Restorationism service: None     Active member of club or organization: None     Attends meetings of clubs or organizations: None     Relationship status: None    Intimate partner violence     Fear of current or ex partner: None     Emotionally abused: None     Physically abused: None     Forced sexual activity: None   Other Topics Concern    None   Social History Narrative    None       SCREENINGS    Vikas Coma Scale  Eye Opening: To speech  Best Verbal Response: Oriented  Best Motor Response: Obeys commands  [place] Coma Scale Score: 14        PHYSICAL EXAM    (up to 7 for level 4, 8 or more for level 5)     ED Triage Vitals [08/19/20 1348]   BP Temp Temp Source Pulse Resp SpO2 Height Weight   129/76 97.5 °F (36.4 °C) Temporal 110 20 95 % -- --       Physical Exam  Vitals signs and nursing note reviewed. Constitutional:       Appearance: He is well-developed. He is obese. Comments: Appears stated age   HENT:      Head: Normocephalic and atraumatic. Mouth/Throat:      Mouth: Mucous membranes are moist.      Pharynx: Oropharynx is clear. Eyes:      Extraocular Movements: Extraocular movements intact. Conjunctiva/sclera: Conjunctivae normal.      Pupils: Pupils are equal, round, and reactive to light. Neck:      Musculoskeletal: Normal range of motion and neck supple. Cardiovascular:      Rate and Rhythm: Normal rate and regular rhythm. Pulses: Normal pulses. Radial pulses are 2+ on the right side and 2+ on the left side. Heart sounds: Normal heart sounds. Pulmonary:      Effort: Pulmonary effort is normal. No respiratory distress. Breath sounds: No stridor. Rales (mild bases) present. No wheezing or rhonchi. Abdominal:      General: Bowel sounds are normal.      Palpations: Abdomen is soft. Abdomen is not rigid. Tenderness: There is no abdominal tenderness. There is no guarding or rebound. Comments: Black stool on VICENTE   Musculoskeletal: Normal range of motion. Lumbar back: He exhibits pain (across lumbar back. no bony step offs. no crepitus. no erythema, ecchymosis or rashes. ).  He exhibits no tenderness, no bony tenderness and normal pulse. Right lower leg: Edema present. Left lower leg: Edema present. Comments: Lower legs swollen and wrapped in ace bandages   Skin:     General: Skin is warm and dry. Neurological:      Mental Status: He is alert. GCS: GCS eye subscore is 4. GCS verbal subscore is 5. GCS motor subscore is 6. Cranial Nerves: No cranial nerve deficit. Sensory: No sensory deficit. Motor: No abnormal muscle tone. Coordination: Coordination normal.      Comments: Alert. Oriented to self and time. Moving all extremities. Equal  strength and flexing /extending feet equal strength against resistance. Psychiatric:         Speech: Speech normal.         Behavior: Behavior normal.         Thought Content:  Thought content normal.         DIAGNOSTIC RESULTS   LABS:    Labs Reviewed   CBC WITH AUTO DIFFERENTIAL - Abnormal; Notable for the following components:       Result Value    RBC 2.98 (*)     Hemoglobin 8.1 (*)     Hematocrit 26.0 (*)     MCHC 30.9 (*)     RDW 20.3 (*)     Platelets 226 (*)     All other components within normal limits    Narrative:     Performed at:  [facility],  [employer] [facility]   Phone [phone]   COMPREHENSIVE METABOLIC PANEL W/ REFLEX TO MG FOR LOW K - Abnormal; Notable for the following components:    Potassium reflex Magnesium 3.3 (*)     Chloride 98 (*)     BUN 62 (*)     CREATININE 4.0 (*)     GFR Non- 14 (*)     GFR  17 (*)     Alb 3.0 (*)     Albumin/Globulin Ratio 0.8 (*)     Alkaline Phosphatase 149 (*)     All other components within normal limits    Narrative:     Performed at:  [address],  [email or web address] [facility]   Phone [phone] - Abnormal; Notable for the following components:    Protime 103.5 (*)     INR 8.73 (*)     All other components within normal limits    Narrative: CALL  [facility]  [facility] tel. [phone],  Everton Mckenna rn er, 08/19/2020 15:00, by Tara Virgen  Performed at:  [facility],  [place] [employer]   Phone [phone]   APTT - Abnormal; Notable for the following components:    aPTT 63.5 (*)     All other components within normal limits    Narrative:     Performed at:  [facility] [address],  [place], [employer]   Phone [phone]   TROPONIN - Abnormal; Notable for the following components:    Troponin 0.03 (*)     All other components within normal limits    Narrative:     Performed at:  [facility] 75,  [place], [employer]   Phone [phone]   BLOOD OCCULT STOOL DIAGNOSTIC - Abnormal; Notable for the following components:    Occult Blood Diagnostic   (*)     Value: Result: POSITIVE  Normal range: Negative      All other components within normal limits    Narrative:     ORDER#: [id number]                          ORDERED BY: Faustino Patterson  SOURCE: Stool                              COLLECTED:  08/19/20 14:00  ANTIBIOTICS AT ALLIE.:                      RECEIVED :  08/19/20 14:20  Performed at:  [facility] 75,  [place], [employer]   Phone [phone]   URINE RT REFLEX TO CULTURE - Abnormal; Notable for the following components:    Clarity, UA SL CLOUDY (*)     Bilirubin Urine SMALL (*)     Protein,  (*)     Leukocyte Esterase, Urine SMALL (*)     All other components within normal limits    Narrative:     Performed at:  [facility]) - [facility] 75,  [place] [employer]   Phone [phone]   BRAIN NATRIURETIC PEPTIDE - Abnormal; Notable for the following components:    Pro-BNP 6,565 (*)     All other components within normal limits    Narrative:     Performed at:  [facility]) - [facility],  [place] [address]   Phone [phone]   PROTIME-INR - Abnormal; Notable for the following components:    Protime 13.8 (*)     INR 1.19 (*)     All other components within normal limits    Narrative:     Performed at:  [facility],  [employer] [place]   Phone [phone]   MICROSCOPIC URINALYSIS - Abnormal; Notable for the following components:    WBC, UA 21-50 (*)     Bacteria, UA 4+ (*)     Yeast, UA Present (*)     Crystals, UA Few Ca.  Oxalate (*)     All other components within normal limits    Narrative:     Performed at:  [place] [address],  [employer] [facility]   Phone [phone]   CBC WITH AUTO DIFFERENTIAL - Abnormal; Notable for the following components:    WBC 11.3 (*)     RBC 2.92 (*)     Hemoglobin 8.1 (*)     Hematocrit 25.7 (*)     RDW 20.2 (*)     Platelets 812 (*)     Neutrophils Absolute 9.6 (*)     Lymphocytes Absolute 0.9 (*)     All other components within normal limits    Narrative:     Performed at:  [place] [address],  [employer]   Phone [phone]   COMPREHENSIVE METABOLIC PANEL W/ REFLEX TO MG FOR LOW K - Abnormal; Notable for the following components:    Sodium 133 (*)     Chloride 97 (*)     BUN 64 (*)     CREATININE 3.9 (*)     GFR Non- 15 (*)     GFR  18 (*)     Alb 3.0 (*)     Albumin/Globulin Ratio 0.8 (*)     Alkaline Phosphatase 142 (*)     All other components within normal limits    Narrative:     Performed at:  [facility] 75,  [employer]   Phone [phone]   TROPONIN - Abnormal; Notable for the following components:    Troponin 0.02 (*)     All other components within normal limits    Narrative:     Performed at:  [facility] 75,  [employer]   Phone [phone]   CULTURE, BLOOD 1   CULTURE, BLOOD 2   C DIFF TOXIN/ANTIGEN   GASTROINTESTINAL PANEL, MOLECULAR   CULTURE, URINE   LACTATE, SEPSIS    Narrative:     Performed at:  [facility] 75,  [place], [employer]   Phone [phone]   LIPASE    Narrative:     Performed at:  [facility] 75,  [place], [employer]   Phone [phone]   MAGNESIUM    Narrative:     Performed at:  [place] [address],  [employer]   Phone [phone]   MAGNESIUM    Narrative:     Performed at:  [place] [address],  [place], [employer]   Phone [phone]   O&P SCREEN(CRYPTOSPORIDIUM/GIARDIA/E. HISTOLYTICA) #1   COVID-19   TYPE AND SCREEN    Narrative:     Performed at:  [facility] [address],  [email or web address], [employer]   Phone [phone]     Results for orders placed or performed during the hospital encounter of 08/19/20   Lactate, Sepsis   Result Value Ref Range    Lactic Acid, Sepsis 1.2 0.4 - 1.9 mmol/L   CBC Auto Differential   Result Value Ref Range    WBC 8.9 4.0 - 11.0 K/uL    RBC 2.98 (L) 4.20 - 5.90 M/uL    Hemoglobin 8.1 (L) 13.5 - 17.5 g/dL    Hematocrit 26.0 (L) 40.5 - 52.5 %    MCV 87.2 80.0 - 100.0 fL    MCH 27.0 26.0 - 34.0 pg    MCHC 30.9 (L) 31.0 - 36.0 g/dL    RDW 20.3 (H) 12.4 - 15.4 %    Platelets 648 (L) 424 - 450 K/uL    MPV 9.9 5.0 - 10.5 fL    Neutrophils % 78.3 %    Lymphocytes % 13.0 %    Monocytes % 8.0 %    Eosinophils % 0.0 %    Basophils % 0.7 %    Neutrophils Absolute 6.9 1.7 - 7.7 K/uL    Lymphocytes Absolute 1.1 1.0 - 5.1 K/uL    Monocytes Absolute 0.7 0.0 - 1.3 K/uL    Eosinophils Absolute 0.0 0.0 - 0.6 K/uL    Basophils Absolute 0.1 0.0 - 0.2 K/uL   Comprehensive Metabolic Panel w/ Reflex to MG   Result Value Ref Range    Sodium 136 136 - 145 mmol/L    Potassium reflex Magnesium 3.3 (L) 3.5 - 5.1 mmol/L    Chloride 98 (L) 99 - 110 mmol/L    CO2 22 21 - 32 mmol/L    Anion Gap 16 3 - 16    Glucose 92 70 - 99 mg/dL    BUN 62 (H) 7 - 20 mg/dL    CREATININE 4.0 (H) 0.8 - 1.3 mg/dL    GFR Non-African American 14 (A) >60    GFR  17 (A) >60    Calcium 8.7 8.3 - 10.6 mg/dL    Total Protein 6.8 6.4 - 8.2 g/dL    Alb 3.0 (L) 3.4 - 5.0 g/dL    Albumin/Globulin Ratio 0.8 (L) 1.1 - 2.2    Total Bilirubin 0.6 0.0 - 1.0 mg/dL    Alkaline Phosphatase 149 (H) 40 - 129 U/L    ALT 10 10 - 40 U/L    AST 27 15 - 37 U/L    Globulin 3.8 g/dL   Protime-INR   Result Value Ref Range    Protime 103.5 (H) 10.0 - 13.2 sec    INR 8.73 (HH) 0.86 - 1.14   APTT   Result Value Ref Range    aPTT 63.5 (H) 24.2 - 36.2 sec   Lipase   Result Value Ref Range    Lipase 36.0 13.0 - 60.0 U/L   Troponin   Result Value Ref Range    Troponin 0.03 (H) <0.01 ng/mL   Blood occult stool #1   Result Value Ref Range    Occult Blood Diagnostic Result: POSITIVE  Normal range: Negative   (A)    Urinalysis Reflex to Culture    Specimen: Urine, clean catch   Result Value Ref Range    Color, UA Yellow Straw/Yellow    Clarity, UA SL CLOUDY (A) Clear    Glucose, Ur Negative Negative mg/dL    Bilirubin Urine SMALL (A) Negative    Ketones, Urine Negative Negative mg/dL    Specific Gravity, UA 1.025 1.005 - 1.030    Blood, Urine Negative Negative    pH, UA 5.0 5.0 - 8.0    Protein,  (A) Negative mg/dL    Urobilinogen, Urine 0.2 <2.0 E.U./dL    Nitrite, Urine Negative Negative    Leukocyte Esterase, Urine SMALL (A) Negative    Microscopic Examination YES     Urine Type NotGiven     Urine Reflex to Culture Yes    Brain Natriuretic Peptide   Result Value Ref Range    Pro-BNP 6,565 (H) 0 - 449 pg/mL   Magnesium   Result Value Ref Range    Magnesium 2.00 1.80 - 2.40 mg/dL   Protime-INR   Result Value Ref Range    Protime 13.8 (H) 10.0 - 13.2 sec    INR 1.19 (H) 0.86 - 1.14   Microscopic Urinalysis   Result Value Ref Range    WBC, UA 21-50 (A) 0 - 5 /HPF RBC, UA 3-4 0 - 4 /HPF    Epithelial Cells, UA 2-5 0 - 5 /HPF    Bacteria, UA 4+ (A) None Seen /HPF    Yeast, UA Present (A) None Seen /HPF    Crystals, UA Few Ca.  Oxalate (A) None Seen /HPF   CBC auto differential   Result Value Ref Range    WBC 11.3 (H) 4.0 - 11.0 K/uL    RBC 2.92 (L) 4.20 - 5.90 M/uL    Hemoglobin 8.1 (L) 13.5 - 17.5 g/dL    Hematocrit 25.7 (L) 40.5 - 52.5 %    MCV 88.3 80.0 - 100.0 fL    MCH 27.6 26.0 - 34.0 pg    MCHC 31.3 31.0 - 36.0 g/dL    RDW 20.2 (H) 12.4 - 15.4 %    Platelets 203 (L) 240 - 450 K/uL    MPV 9.1 5.0 - 10.5 fL    Neutrophils % 84.7 %    Lymphocytes % 8.1 %    Monocytes % 6.8 %    Eosinophils % 0.0 %    Basophils % 0.4 %    Neutrophils Absolute 9.6 (H) 1.7 - 7.7 K/uL    Lymphocytes Absolute 0.9 (L) 1.0 - 5.1 K/uL    Monocytes Absolute 0.8 0.0 - 1.3 K/uL    Eosinophils Absolute 0.0 0.0 - 0.6 K/uL    Basophils Absolute 0.0 0.0 - 0.2 K/uL   Comprehensive Metabolic Panel w/ Reflex to MG   Result Value Ref Range    Sodium 133 (L) 136 - 145 mmol/L    Potassium reflex Magnesium 3.5 3.5 - 5.1 mmol/L    Chloride 97 (L) 99 - 110 mmol/L    CO2 21 21 - 32 mmol/L    Anion Gap 15 3 - 16    Glucose 82 70 - 99 mg/dL    BUN 64 (H) 7 - 20 mg/dL    CREATININE 3.9 (H) 0.8 - 1.3 mg/dL    GFR Non-African American 15 (A) >60    GFR  18 (A) >60    Calcium 8.8 8.3 - 10.6 mg/dL    Total Protein 7.0 6.4 - 8.2 g/dL    Alb 3.0 (L) 3.4 - 5.0 g/dL    Albumin/Globulin Ratio 0.8 (L) 1.1 - 2.2    Total Bilirubin 0.8 0.0 - 1.0 mg/dL    Alkaline Phosphatase 142 (H) 40 - 129 U/L    ALT 10 10 - 40 U/L    AST 26 15 - 37 U/L    Globulin 4.0 g/dL   Troponin   Result Value Ref Range    Troponin 0.02 (H) <0.01 ng/mL   Magnesium   Result Value Ref Range    Magnesium 2.00 1.80 - 2.40 mg/dL   EKG 12 Lead   Result Value Ref Range    Ventricular Rate 113 BPM    Atrial Rate 73 BPM    QRS Duration 154 ms    Q-T Interval 414 ms    QTc Calculation (Bazett) 567 ms    R Axis 88 degrees    T Axis -83 degrees Diagnosis       Atrial fibrillation with rapid ventricular responseRight bundle branch blockAbnormal ECGWhen compared with ECG of 15-CELIA-2020 19:03,Atrial fibrillation has replaced Wide QRS tachycardiaConfirmed by Amy Ford MD, [address] (1985) on 8/19/2020 4:55:38 PM   TYPE AND SCREEN   Result Value Ref Range    ABO/Rh A POS     Antibody Screen NEG        All other labs were within normal range or not returned as of this dictation. EKG: All EKG's are interpreted by the Emergency Department Physician in the absence of a cardiologist.  Please see their note for interpretation of EKG. RADIOLOGY:   Non-plain film images such as CT, Ultrasound and MRI are read by the radiologist. Plain radiographic images are visualized andpreliminarily interpreted by the  ED Provider with the below findings:        Interpretation perthe Radiologist below, if available at the time of this note:    XR CHEST PORTABLE   Final Result   Findings of CHF with cardiomegaly, pulmonary vascular congestion and   bilateral pleural effusions. No results found.         PROCEDURES   Unless otherwise noted below, none     Procedures    CRITICAL CARE TIME   N/A    CONSULTS:  IP CONSULT TO HOSPITALIST  IP CONSULT TO NEPHROLOGY  IP CONSULT TO GI  IP CONSULT TO PULMONOLOGY  IP CONSULT TO NEPHROLOGY      EMERGENCY DEPARTMENT COURSE and DIFFERENTIAL DIAGNOSIS/MDM:   Vitals:    Vitals:    08/19/20 2104 08/19/20 2133 08/19/20 2333 08/20/20 0020   BP: 108/70 135/71 125/69 128/67   Pulse: 124 117 117 120   Resp: 20 21 21 22   Temp:    99.3 °F (37.4 °C)   TempSrc:    Oral   SpO2: 97% 96% 96% 97%   Weight:    231 lb (104.8 kg)   Height:    5' 10\" (1.778 m)       Patient was given thefollowing medications:  Medications   0.9 % sodium chloride infusion (1,000 mLs Intravenous New Bag 8/19/20 1758)   pantoprazole (PROTONIX) injection 40 mg (40 mg Intravenous Given 8/19/20 1523)   pantoprazole (PROTONIX) injection 40 mg (40 mg Intravenous Given 8/19/20 1705)

## 2020-08-19 NOTE — PROGRESS NOTES
Called for 82 yo male with PMH of CAD s/p PCI & NICKIE x 4 (NSTEMI in 12/2019), Ischemic CM, HTN, CKD stage III, PAF AC on Coumadin, hx of complete heart block s/p dual chamber pacemaker and chronic lymphedema who presents with acute blood loss anemia and melena. Noted to be coagulopathic and given Kcentra. Had been admitted previously with stercoral colitis. Planning admission to ICU. Will tentatively plan urgent EGD for am.  Please call if changes in clinical status.

## 2020-08-19 NOTE — ED NOTES
Call placed to Dr Jean Carlos [employer] is on call at this time @ [address]  08/19/20 1721    Dr hernandez returned the call to [facility] @ [address]  08/19/20 1722

## 2020-08-20 NOTE — PROGRESS NOTES
H/p dictation id [id number]. Date of service 08/20/20. Ugib. Acute post hemorrhagic anemia. Carlos. Cad. Htn.

## 2020-08-20 NOTE — H&P
[address]                 [address] [address]                              HISTORY AND PHYSICAL    PATIENT NAME: Ronald Solares                     :        1935  MED REC NO:   [id number]                          ROOM:       7744  ACCOUNT NO:   [de-identified]                           ADMIT DATE: 2020  PROVIDER:     Padmini Rudolph MD    DATE OF SERVICE:  I obtained the history and performed physical exam on  the patient on the medical floor on 2020    CHIEF COMPLAINT:  \"I had a pain in the back. \"    HISTORY OF PRESENT ILLNESS:  An 29-year-old  male who is an  extremely poor historian, does not provide a good history whatsoever,  apparently has not been eating or drinking well at the care facility. Was noted to have an abnormal lab of low hemoglobin on 2020. IN  the ER, the patient was noted to have a tarry stool. A good history  cannot be obtained from the patient. PAST MEDICAL HISTORY:  1. Coronary artery disease. 2.  Atrial fibrillation. 3.  Hypertension. 4.  Sensorineural hearing loss. 5.  Risk of anemia. PAST SURGICAL HISTORY:  Coronary artery angiography with a diagnosis of  multivessel coronary artery disease for which intervention was advised,  but the patient has not had any. ALLERGIC HISTORY:  SULFA DRUGS. FAMILY HISTORY:  Reviewed by me and is currently noncontributory. SOCIAL HISTORY:  Lives at a nursing facility. CURRENT MEDICATIONS:  Toprol-XL, potassium chloride, sodium bicarbonate,  Lipitor, Lasix, Brilinta, hydralazine, Warfarin. REVIEW OF SYSTEMS:  Significant for the black stool and per the history  of present illness. All other systems reviewed and are negative except  for the history of present illness. A good review of systems cannot be  obtained because the patient does not provide a good history.     PHYSICAL EXAMINATION:  VITAL SIGNS:  Temperature, T-max 99.3, respiratory rate 22, pulse  initially 112 to 120, with blood pressure 128/67, saturating 97%. CNS:  The patient is awake and not floridly confused. PSYCH:  The patient is not agitated. HEENT:   Eyes:  Pupils are bilaterally equal.  ENT:  No visible oral  mucosal lesions. RESPIRATORY SYSTEM:  No obvious audible wheezes or rhonchi. CVS:  Tachycardic. ABDOMEN:  Nondistended. MUSCULOSKELETAL EXAM:  No acute deformity. SKIN:  Without rashes or lesions. Does appear pale. DIAGNOSTIC DATA:  Troponin 0.02, magnesium 2.0, BUN 64, creatinine 3.9,  sodium 132, potassium 3.5. Hemoglobin 8.1, hematocrit 25.7. COVID test  pending. INR 1.19. Initial INR was over 8 which was reversed in the  ER. Hemoccult positive stool. Chest x-ray shows CHF with cardiomegaly, pulmonary congestion, and  effusion. CONSULTATION:  Nephrology, Gastroenterology, and Critical Care Medicine. Review of previous records shows a coronary angiography done in 2019  that showed multivessel coronary artery disease with plan being high  risk multivessel PCI which was in 12/2019. ASSESSMENT:  1. Acute upper gastrointestinal bleed with acute posthemorrhagic  anemia. 2.  Acute kidney injury. 3.  Multivessel coronary artery disease. 4.  Hypertension. 5.  Dyslipidemia. PLAN OF CARE:  The patient admitted to Internal Medicine Service. IV  Protonix has been initiated. GI consult requested. The patient's INR  was reversed. Further management will be per GI recommendations. Nephrotoxic medications will be avoided. CODE STATUS:  Full. EXPECTED LENGTH OF STAY:  More than 2 midnights based on plan of care  above. RISK:  High due to the patient's presentation with the GI bleed with  posthemorrhagic anemia and acute kidney injury. DISPOSITION:  Admit to Internal Medicine Service.         Jcarlos Bustamante MD    D: 08/20/2020 6:14:03       T: 08/20/2020 8:55:21     [id number]  Job#: [id number]     Doc#: [id number]    CC:

## 2020-08-20 NOTE — PROGRESS NOTES
Placed on telemetry; showing a fib w/ RVR;  to 120. Spo2 98% on room air. Denies pain, SCD's applied; protonix drip started. Demonstrated ability to move [facility] up and down; bed alarm on, call light in reach. Will continue to monitor.

## 2020-08-20 NOTE — CARE COORDINATION
Case Management Assessment  Initial Evaluation      Patient Name: Nolan Ibrahim  YOB: 1935  Diagnosis: Gastrointestinal hemorrhage [K92.2]  Gastrointestinal hemorrhage [K92.2]  Date / Time: 8/19/2020  1:36 PM    Admission status/Date:8/19/20  Chart Reviewed: Yes      Patient Interviewed: No   Family Interviewed:  Yes - wife and edenilson via phone      Hospitalization in the last 30 days:  No    Primary Contact Information:   Who do you trust or have selected to make healthcare decisions for you? Name:   Thiago Mooney  Phone  Number:  [phone] or [phone]  Can this person be reached and be able to respond quickly, such as within a few minutes or hours? Yes  Who would be your back-up decision maker? Name Claudia pyle  Phone Number: [phone]    Met with:  Spoke with wife and edenilson via phone   Jatin Blackwell conducted  (bedside/phone):    Current PCP: [address] [place] [address] required for SNF : Y          3 night stay required -  [employer]  Support Systems/Care Needs:    Transportation: family    Meal Preparation:  SNF but family will provide at d/c    Housing  Living Arrangements: lives 1 story home  Steps: 3  Intent for return to present living arrangements:  Will return home at dc NOT SNF       [address] with [address] : No Agency:(Services)     Passport/Waiver : No  :                      Phone Number:    Passport/Waiver Services:           Durable Medical Equiptment   DME Provider:   Equipment:*  Walker_x__Cane_x__RTS___ BSC___Shower Chair___Hospital Bed___W/C__x__Other________  02 at ____Liter(s)---wears(frequency)_______ [facility] ___ CPAP___ BiPap___   N/A____      Home O2 Use :  No    If No for home O2---if presently on O2 during hospitalization:  No  if yes CM to follow for potential DC O2 need  Informed of need for care provider to bring portable home O2 tank on day of discharge for nursing to connect prior to leaving:   Not Indicated  Verbalized agreement/Understanding:   Not Indicated    Community Service Affiliation  Dialysis:  No    · Agency:  · Location:  · Dialysis Schedule:  · Phone:   · Fax: Other Community Services:  N/A  (ex:PT/OT,Mental Health,Wound Clinic, Cardio/Pul [address])    DISCHARGE PLAN: Explained Case Management role/services. Reviewed chart and spoke with pt wife and edenilson re: dcp. Attempted to speak with pt via phone as he is in C-19 precautions but no answer. Per wife pt is from [facility] for STR but plan will be home at [place] from hospital. Discussed care needs and wife defers writer to talk with pt edenilson Geronimo. Spoke with Grazyna via phone who states pt will return home at IN and she will be staying with her parents to assist with care needs. Staes pt not eating at SNF as he does not like the food and has become more depressed as can not have visitors. Discussed HHC and edenilson declines list and chooses [facility]. Referral called to Kassandra Adame from [facility] who will arrange for [place] [address] needs. Also discussed poss hospital bed and edenilson would like to use [facility] for this. Spoke with Eryn Shay from [facility] re: need for hospital bed at [place]. Spoke with Neha at [facility] to update on dcp and per Neha pt last covered day at SNF is 8/21 and family was planning on taking pt home. Will cont to follow.

## 2020-08-20 NOTE — DISCHARGE INSTR - COC
Continuity of Care Form    Patient Name: Fela Lane   :  1935  MRN:  [id number]    Admit date:  2020  Discharge date:  ***    Code Status Order: Full Code   Advance Directives:   Advance Care Flowsheet Documentation     Date/Time Healthcare Directive Type of Healthcare Directive Copy in [address] Agent's Name Healthcare Agent's Phone Number    [phone]  Yes, patient has an advance directive for healthcare treatment  Health care treatment directive  Yes, copy in chart  Healthcare power of   --  --          Admitting Physician:  Jessee Rose MD  PCP: Dorene Manuel MD    Discharging Nurse: [facility] Unit/Room#: 8690/3813-12  Discharging Unit Phone Number: ***    Emergency Contact:   Extended Emergency Contact Information  Primary Emergency Contact: [address] [place] [address] Phone: [phone]  Work Phone: [phone]  Mobile Phone: [phone]  Relation: Child  Secondary Emergency Contact: [facility]  Address: [address], [address], [facility] [address]  Phone: [phone]  Mobile Phone: [phone]  Relation: Spouse    Past Surgical History:  Past Surgical History:   Procedure Laterality Date    CARDIAC CATHETERIZATION  2019    Multi Vessel CAD, needs intervention    CARDIAC PACEMAKER PLACEMENT  2019    CHOLECYSTECTOMY      EYE SURGERY  13    Left Eye Cataract Removal    JOINT REPLACEMENT  11    right total hip arthroplasty    OTHER SURGICAL HISTORY  2017    excision of skin lesion chest and left neck    PACEMAKER PLACEMENT      PROSTATE SURGERY      PTCA  2019    UPPER GASTROINTESTINAL ENDOSCOPY N/A 2020    EGD W/ANES.  performed by Lucia Thibodeaux DO at [address]       Immunization History:   Immunization History   Administered Date(s) Administered    Influenza Virus Vaccine 2011, 2012    Pneumococcal Conjugate 7-valent (Prevnar7) 11/09/2009       Active Problems:  Patient Active Problem List   Diagnosis Code    Status post hip replacement Z96.649    CHF (congestive heart failure) ([facility]) I50.9    Atrial fibrillation ([facility]) I48.91    DVT (deep venous thrombosis) ([facility]) I82.409    Anticoagulated on Coumadin Z79.01    CKD (chronic kidney disease) N18.9    Neoplasm of uncertain behavior of skin D48.5    Venous insufficiency I87.2    Lymphedema I89.0    Chronic ulcer of right leg, limited to breakdown of skin ([facility] [address] L97.911    Chronic ulcer of left leg, limited to breakdown of skin ([facility]) L97.921    Hyperkalemia E87.5    CKD (chronic kidney disease) stage 3, GFR 30-59 ml/min ([facility]) N18.3    Unstable angina ([facility]) I20.0    NSTEMI (non-ST elevated myocardial infarction) ([facility]) I21.4    CAD (coronary artery disease) I25.10    Obesity E66.9    Sinus pause I45.5    Cardiac arrhythmia I49.9    Pacemaker Z95.0    Septicemia ([facility]) A41.9    Acute midline low back pain without sciatica M54.5    Discitis of lumbar region M46.46    Lactic acidosis E87.2    Acute cystitis without hematuria N30.00    Colitis K52.9    Gastrointestinal hemorrhage K92.2       Isolation/Infection:   Isolation          Droplet Plus  C Diff Contact        Patient Infection Status     Infection Onset Added Last Indicated Last Indicated By Review Planned Expiration Resolved Resolved By    C-diff (Clostridium difficile) 08/20/20 08/20/20 08/20/20 Clostridium Difficile Toxin/Antigen 08/27/20       COVID-19 Rule Out 08/19/20 08/19/20 08/19/20 COVID-19 (Ordered) 08/26/20 09/02/20      Resolved    C-diff Rule Out 08/19/20 08/19/20 08/20/20 Clostridium Difficile Toxin/Antigen (Ordered)   08/20/20 Rule-Out Test Resulted          Nurse Assessment:  Last Vital Signs: /73   Pulse 106   Temp 97.6 °F (36.4 °C) (Oral)   Resp 20   Ht 5' 10\" (1.778 m)   Wt 231 lb (104.8 kg)   SpO2 96%   BMI 33.15 kg/m²     Last documented pain score (0-10 scale): Pain Level: 0  Last Weight:   Wt Readings from Last 1 Encounters:   08/20/20 231 lb (104.8 kg)     Mental Status:  {IP PT MENTAL STATUS:20030}    IV Access:  { ULICES IV ACCESS:332068314}    Nursing Mobility/ADLs:  Walking   {CHP DME QZJS:749369840}  Transfer  {CHP DME PJPQ:942233192}  Bathing  {CHP DME XHEK:737626248}  Dressing  {CHP DME BONC:305433900}  Toileting  {CHP DME VARR:851626039}  Feeding  {CHP DME NZPA:216063418}  Med Admin  {CHP DME NJLW:203669131}  Med Delivery   { ULICES MED Delivery:878159673}    Wound Care Documentation and Therapy:  Wound 06/15/20 Buttocks Upper;Left Deep Tissue Injury (Active)   Number of days: 65       Wound 06/16/20 Buttocks Right;Left Stage 2 right buttock (Active)   Number of days: 65        Elimination:  Continence:   · Bowel: {YES / DN:65508}  · Bladder: {YES / WD:13702}  Urinary Catheter: {Urinary Catheter:903566565}   Colostomy/Ileostomy/Ileal Conduit: {YES / VE:58535}       Date of Last BM: ***    Intake/Output Summary (Last 24 hours) at 8/20/2020 1614  Last data filed at 8/20/2020 1119  Gross per 24 hour   Intake 50 ml   Output 125 ml   Net -75 ml     I/O last 3 completed shifts:   In: 48 [I.V.:50]  Out: 125 [Urine:125]    Safety Concerns:     [employer] Safety Concerns:821453288}    Impairments/Disabilities:      [address] [employer] Impairments/Disabilities:000878636}    Nutrition Therapy:  Current Nutrition Therapy:   [employer] Diet List:105397738}    Routes of Feeding: {P DME Other Feedings:887400270}  Liquids: {Slp liquid thickness:07325}  Daily Fluid Restriction: {CHP DME Yes amt example:375952134}  Last Modified Barium Swallow with Video (Video Swallowing Test): {Done Not Done FCWU:432158505}    Treatments at the Time of Hospital Discharge:   Respiratory Treatments: ***  Oxygen Therapy:  {Therapy; copd oxygen:44277}  Ventilator:    {JANEL JORGE Vent UINI:394762314}    Rehab Therapies: {THERAPEUTIC INTERVENTION:9665127553}  Weight Bearing Status/Restrictions: {JANEL JORGE Weight I certify the above information and transfer of Nelda Ray  is necessary for the continuing treatment of the diagnosis listed and that he requires {Admit to Appropriate Level of Care:80565} for {GREATER/LESS:884812275} 30 days.      Update Admission H&P: {CHP DME Changes in [facility]:080689021}    PHYSICIAN SIGNATURE:  {Esignature:405882539}

## 2020-08-20 NOTE — ANESTHESIA PRE PROCEDURE
Department of Anesthesiology  Preprocedure Note       Name:  Patrice Eckert   Age:  81 y.o.  :  1935                                          MRN:  [id number]         Date:  2020      Surgeon: Deya Soto):  Pop Krueger DO    Procedure: Procedure(s):  EGD W/ANES. Medications prior to admission:   Prior to Admission medications    Medication Sig Start Date End Date Taking? Authorizing Provider   allopurinol (ZYLOPRIM) 100 MG tablet Take 100 mg by mouth 2 times daily 20  Yes Historical Provider, MD   simvastatin (ZOCOR) 20 MG tablet Take 20 mg by mouth daily 13  Yes Historical Provider, MD   omeprazole (PRILOSEC) 20 MG delayed release capsule Take 20 mg by mouth daily   Yes Historical Provider, MD   acetaminophen (TYLENOL) 325 MG tablet Take 650 mg by mouth every 6 hours as needed for Pain   Yes Historical Provider, MD   HYDROcodone-acetaminophen (NORCO) 5-325 MG per tablet Take 1 tablet by mouth every 6 hours as needed for Pain. Yes Historical Provider, MD   furosemide (LASIX) 40 MG tablet Take 20 mg by mouth 2 times daily 20  Yes Historical Provider, MD   citalopram (CELEXA) 10 MG/5ML solution Take 15 mg by mouth daily   Yes Historical Provider, MD   simvastatin (ZOCOR) 80 MG tablet Take 80 mg by mouth nightly   Yes Historical Provider, MD   metoprolol succinate (TOPROL XL) 50 MG extended release tablet TAKE ONE TABLET BY MOUTH DAILY  Patient taking differently: 25 mg 2 times daily  20   CALIXTO Lara CNP   potassium chloride (KLOR-CON M) 10 MEQ extended release tablet Take 1 tablet by mouth daily Take 2 tablets today and then one daily after.   Repeat blood work on 20   CALIXTO Lara CNP   sodium bicarbonate 650 MG tablet Take 1 tablet by mouth 3 times daily 19   Adriana Benitez MD   ticlulaor [facility]) 90 MG TABS tablet Take 1 tablet by mouth 2 times daily 19   Adriana Benitez MD   hydrALAZINE (APRESOLINE) 50 MG tablet Take 1 tablet by mouth every 8 hours 12/24/19   Lynda Cervantes MD   warfarin (COUMADIN) 2 MG tablet Take 2 or 3 mg daily as directed by coumadin clinic. Patient taking differently: Take 2 or 3 mg daily every other day 9/20/18   Vikas Castorena MD       Current medications:    Current Facility-Administered Medications   Medication Dose Route Frequency Provider Last Rate Last Dose    sodium chloride flush 0.9 % injection 10 mL  10 mL Intravenous 2 times per day ELIANA Little-ROLANDO        sodium chloride flush 0.9 % injection 10 mL  10 mL Intravenous PRN Arielle Client, PA-ROLANDO        acetaminophen (TYLENOL) tablet 650 mg  650 mg Oral Q6H PRN Arielle Client, PA-C        Or    acetaminophen (TYLENOL) suppository 650 mg  650 mg Rectal Q6H PRN Arielle Client, PA-ROLANDO        promethazine (PHENERGAN) tablet 12.5 mg  12.5 mg Oral Q6H PRN Arielle Miles, PA-ROLANDO        Or    ondansetron (ZOFRAN) injection 4 mg  4 mg Intravenous Q6H PRN Arielle Miles, PA-ROLANDO        pantoprazole (PROTONIX) 80 mg in sodium chloride 0.9 % 100 mL infusion  8 mg/hr Intravenous Continuous MARCO LittleC 10 mL/hr at 08/20/20 0119 8 mg/hr at 08/20/20 0119    0.9 % sodium chloride infusion  1,000 mL Intravenous Continuous Pavel Mayo MD 75 mL/hr at 08/19/20 1758 1,000 mL at 08/19/20 1758     Facility-Administered Medications Ordered in Other Encounters   Medication Dose Route Frequency Provider Last Rate Last Dose    propofol injection    PRN CALIXTO Albert - CRNA   60 mg at 08/20/20 0810       Allergies:     Allergies   Allergen Reactions    Sulfa Antibiotics      Joint swelling       Problem List:    Patient Active Problem List   Diagnosis Code    Status post hip replacement Z96.649    CHF (congestive heart failure) ([facility]) I50.9    Atrial fibrillation (HCC) I48.91    DVT (deep venous thrombosis) (HCC) I82.409    Anticoagulated on Coumadin Z79.01    CKD (chronic kidney disease) N18.9    Neoplasm of uncertain behavior of skin D48.5    Venous insufficiency I87.2    Lymphedema I89.0    Chronic ulcer of right leg, limited to breakdown of skin ([address] L97.911    Chronic ulcer of left leg, limited to breakdown of skin ([address] L97.921    Hyperkalemia E87.5    CKD (chronic kidney disease) stage 3, GFR 30-59 ml/min ([facility]) N18.3    Unstable angina ([facility]) I20.0    NSTEMI (non-ST elevated myocardial infarction) ([facility]) I21.4    CAD (coronary artery disease) I25.10    Obesity E66.9    Sinus pause I45.5    Cardiac arrhythmia I49.9    Pacemaker Z95.0    Septicemia ([facility]) A41.9    Acute midline low back pain without sciatica M54.5    Discitis of lumbar region M46.46    Lactic acidosis E87.2    Acute cystitis without hematuria N30.00    Colitis K52.9    Gastrointestinal hemorrhage K92.2       Past Medical History:        Diagnosis Date    Arthritis     Atrial flutter with rapid ventricular response ([facility] [address]     Blood transfusion     CAD (coronary artery disease) 12/2019    NSTEMI, + Troponins    Cellulitis 9/19/2012    Cellulitis of right anterior lower leg 5/10/2018    CHF (congestive heart failure) ([facility])     CHF exacerbation ([facility])     DVT (deep venous thrombosis) ([facility])     right leg    Gout     Hard of hearing 2019    wears hearing aids    Hyperlipidemia     Hypertension     Irregular heart beat     Prostate cancer ([facility] [address]     kaycee score 4+3       Past Surgical History:        Procedure Laterality Date    CARDIAC CATHETERIZATION  12/17/2019    Multi Vessel CAD, needs intervention    CARDIAC PACEMAKER PLACEMENT  12/28/2019    CHOLECYSTECTOMY      EYE SURGERY  2/26/13    Left Eye Cataract Removal    JOINT REPLACEMENT  05/16/11    right total hip arthroplasty    OTHER SURGICAL HISTORY  05/24/2017    excision of skin lesion chest and left neck    PACEMAKER PLACEMENT      PROSTATE SURGERY      PTCA  12/27/2019       Social History:    Social History     Tobacco Use    Smoking status: Former Smoker Types: Pipe     Last attempt to quit: 1970     Years since quittin.2    Smokeless tobacco: Former User     Types: Chew     Quit date: 1962   Substance Use Topics    Alcohol use: Yes     Alcohol/week: 2.0 standard drinks     Types: 2 Cans of beer per week                                Counseling given: Not Answered      Vital Signs (Current):   Vitals:    20 2333 20 0020 20 0436 20 0800   BP: 125/69 128/67 113/61 132/86   Pulse: 117 120 110 111   Resp:    Temp:  99.3 °F (37.4 °C) 98.2 °F (36.8 °C) 98 °F (36.7 °C)   TempSrc:  Oral Oral Temporal   SpO2: 96% 97% 97%    Weight:  231 lb (104.8 kg)     Height:  5' 10\" (1.778 m)                                                BP Readings from Last 3 Encounters:   20 132/86   20 88/62   20 134/64       NPO Status: Time of last liquid consumption: 0500                        Time of last solid consumption: 1700                        Date of last liquid consumption: 20                        Date of last solid food consumption: 20    BMI:   Wt Readings from Last 3 Encounters:   20 231 lb (104.8 kg)   20 238 lb 3.2 oz (108 kg)   20 245 lb (111.1 kg)     Body mass index is 33.15 kg/m².     CBC:   Lab Results   Component Value Date    WBC 11.3 2020    RBC 2.92 2020    HGB 7.8 2020    HCT 26.8 2020    MCV 88.3 2020    RDW 20.2 2020     2020       CMP:   Lab Results   Component Value Date     2020    K 3.4 2020    CL 98 2020    CO2 18 2020    BUN 65 2020    CREATININE 4.1 2020    GFRAA 17 2020    GFRAA 59 2012    AGRATIO 0.8 2020    LABGLOM 14 2020    GLUCOSE 80 2020    PROT 7.0 2020    PROT 7.7 2012    CALCIUM 8.5 2020    BILITOT 0.8 2020    ALKPHOS 142 2020    AST 26 2020    ALT 10 2020       POC Tests: No results for input(s): POCGLU, POCNA, POCK, POCCL, POCBUN, POCHEMO, POCHCT in the last 72 hours. Coags:   Lab Results   Component Value Date    PROTIME 14.3 08/20/2020    INR 1.23 08/20/2020    APTT 63.5 08/19/2020       HCG (If Applicable): No results found for: PREGTESTUR, PREGSERUM, HCG, HCGQUANT     ABGs: No results found for: PHART, PO2ART, HFM6ECT, BSD0MTH, BEART, U8KEBCSX     Type & Screen (If Applicable):  Lab Results   Component Value Date    LABABO A 05/09/2011    [address] Positive 05/09/2011       Drug/Infectious Status (If Applicable):  No results found for: HIV, HEPCAB    COVID-19 Screening (If Applicable):   Lab Results   Component Value Date    COVID19 Not Detected 06/16/2020         Anesthesia Evaluation  Patient summary reviewed and Nursing notes reviewed  Airway: Mallampati: II  TM distance: >3 FB   Neck ROM: full  Mouth opening: > = 3 FB Dental:      Comment: Poor dentition    Pulmonary:Negative Pulmonary ROS   (+) decreased breath sounds,                             Cardiovascular:    (+) hypertension: mild, angina:, pacemaker:, past MI:, CAD:, dysrhythmias: atrial fibrillation, CHF:,         Rhythm: irregular  Rate: normal                 ROS comment: Patient is on anticoagulation and presented with en elevated INR; received PT complex yesterday with normalization of INR     Neuro/Psych:   Negative Neuro/Psych ROS              GI/Hepatic/Renal: Neg GI/Hepatic/Renal ROS            Endo/Other: Negative Endo/Other ROS                    Abdominal:   (+) obese,         Vascular: negative vascular ROS. Anesthesia Plan      MAC     ASA 4       Induction: intravenous. Anesthetic plan and risks discussed with patient. Plan discussed with CRNA.                   Frank Mendez MD   8/20/2020

## 2020-08-20 NOTE — PROGRESS NOTES
QD with holding parameters   - AC on coumadin, HELD FOR GIB    COVID r/o  - patient is from SNF  - DROPLET + PRECAUTIONS   - COVID-19 TESTING PENDING    HLD  - Continue statin     Chronic Lymphedema  - supportive measures    DVT Prophylaxis:SCDs  Diet: Diet NPO Effective Now Exceptions are: Ice Chips  Diet NPO Time Specified Exceptions are: Ice Chips  Code Status: Full Code    Krista Stapleton 1:56 PM 8/20/2020

## 2020-08-20 NOTE — OP NOTE
Esophagogastroduodenoscopy Note    Patient:   Lola Kumar    YOB: 1935    Facility:     [facility] [address] ENDOSCOPY  [address] [address]   [Inpatient]   Referring/PCP: David Carmichael MD    Procedure:   Esophagogastroduodenoscopy --diagnostic  Date:     8/20/2020   Endoscopist:  Eric Gunn     Preoperative Diagnosis:   Melena    Postoperative Diagnosis:  normal    Anesthesia:  MAC    Estimated blood loss: None    Complications: None    Description of Procedure:  Informed consent was obtained from the patient after explanation of the procedure including indications, description of the procedure,  benefits and possible risks and complications of the procedure, and alternatives. Questions were answered. The patient's history was reviewed and a directed physical examination was performed prior to the procedure. Patient was monitored throughout the procedure with pulse oximetry and periodic assessment of vital signs. Patient was sedated as noted above. The Nursing staff and I performed a time out. With the patient in the left lateral decubitus position, the Olympus videoendoscope was placed in the patient's mouth and under direct visualization passed into the esophagus. The scope was ultimately passed to the second portion of the duodenum. Visualization was performed during both introduction and withdrawal of the endoscope and retroflexed view of the proximal stomach was obtained. Findings[de-identified]   Esophagus: normal. The findings do not support a diagnosis of Grady's Esophagus.   Stomach: normal  Duodenum: normal    Recommendations:   No ulcerations, erosions, or evidence of upper gastrointestinal bleeding source  No blood in the upper gastrointestinal tract  Suspect patient may have had mucosal oozing with coagulopathy, with resolution after reversal  Monitor for evidence of further bleeding    Eric Gunn DO    [address] [facility] [address] Munoz    [address] [place] [address]     Phone: [phone]     Fax: [phone]

## 2020-08-20 NOTE — PROGRESS NOTES
Physician Progress Note      Mike Mckenzie  CSN #:                  [id number]  :                       1935  ADMIT DATE:       2020 1:36 PM  [address] [place] DATE:  RESPONDING  PROVIDER #:        Prakash Blanton MD          QUERY TEXT:    Dear Attending Provider,  Patient admitted with Acute upper gastrointestinal bleed with acute   posthemorrhagic  anemia and is on chronic anticoagulation. The GI consult noted: Suspect   patient may have had mucosal oozing with coagulopathy, with resolution after   reversal  If possible, please document in the progress notes and discharge   summary if you are evaluating and/or treating any of the following: The medical record reflects the following:  Risk Factors: coumadin  Clinical Indicators: INR-8.73, GI noted mucosal bleeding with coaulopathy  Treatment: vitamin K, ([facility], monitoring    Thank you for your assistance,  Massiel Toney RN,BSN,CCDS,CRCR  [phone]  Options provided:  -- Bleeding (Hemorrhagic disorder) due to coumadin. -- Bleeding unrelated to anticoagulation  -- Other - I will add my own diagnosis  -- Disagree - Not applicable / Not valid  -- Disagree - Clinically unable to determine / Unknown  -- Refer to Clinical Documentation Reviewer    PROVIDER RESPONSE TEXT:    This patient has bleeding due to coumadin.     Query created by: Zaira Senior on 2020 1:10 PM      Electronically signed by:  Prakash Blanton MD 2020 7:02 PM

## 2020-08-20 NOTE — PROGRESS NOTES
ICU charge RN spoke with Dr. Madi Amaro who saw the patient in ED. Okay for patient to go to [place] [address] with heart monitor. CBC, CMP, troponin being drawn in ED before going to the floor. Dr. Otto Corado also okay for patient to be admitted to [place] [address] with heart monitor.

## 2020-08-20 NOTE — PROGRESS NOTES
Placed call to Dr. Flor Sousa regarding minimal urine output. Patient received IV Lasix per order and continues to have less than 25 ml of urine output. Bladder scan complete and 0 ml of urine noted in bladder. Received new order to change chronic cath. 16 fr sanchez inserted and patient tolerated well. No other orders received.

## 2020-08-20 NOTE — ANESTHESIA POSTPROCEDURE EVALUATION
Department of Anesthesiology  Postprocedure Note    Patient: Katelyn Garza  MRN: [id number]  YOB: 1935  Date of evaluation: 8/20/2020  Time:  11:38 AM     Procedure Summary     Date:  08/20/20 Room / Location:  [place] [address] / [facility]    Anesthesia Start:  0805 Anesthesia Stop:  8857    Procedure:  EGD W/ANES. (N/A ) Diagnosis:  (?)    Surgeon:  Ron Martínez DO Responsible Provider:  Juan Herrera MD    Anesthesia Type:  MAC ASA Status:  4          Anesthesia Type: No value filed. Jyoti Phase I:      Jyoti Phase II: Jyoti Score: 8    Last vitals: Reviewed and per EMR flowsheets.        Anesthesia Post Evaluation    Patient location during evaluation: PACU  Patient participation: complete - patient participated  Level of consciousness: awake and alert  Pain score: 0  Airway patency: patent  Nausea & Vomiting: no nausea and no vomiting  Complications: no  Cardiovascular status: blood pressure returned to baseline  Respiratory status: acceptable  Hydration status: stable

## 2020-08-20 NOTE — PROGRESS NOTES
with EF of 40 to 45% December 2019  · H/O CHB s/p dual-chamber PPM  · Chronic kidney disease stage III  · Hypertension  · Chronic lymphedema    PLAN:  · S/P EGD, additional recommendations per Gastroenterology   · IVF per nephrology  · Start p.o. vancomycin  · Prophylaxis: SCD, Bactroban  · Call with questions

## 2020-08-20 NOTE — CONSULTS
[facility]    Consult Note           Reason for Consult: ANGEL on CKD  Requesting Physician:  Dr. Camila Graves    Chief Complaint:    Chief Complaint   Patient presents with    Abnormal Lab     [facility], Hgb 7.5 on 8/17. Per report, pt is uncooperative w/ care, isn't eating and drinking. Pt is A&O during triage, DNR. History of Present Illness on 8/20/2020:    81 y.o. yo male with PMH of atrial flutter, CAD, CHF, gout, hypertension, prostate cancer who is admitted for ANGEL, anemia  Patient lives in a nursing home and blood work on 8/17 showed hemoglobin of 7.5. He also had decreased oral intake for several days  It was reported that he started having black tarry stool with INR of 8 and was sent to the emergency room.   Patient received Kcentra and vitamin K in the emergency room  Nephrology has been consulted with a creatinine rise to 4.1, his last creatinine was 1.8 on 7/27/2020  Patient underwent EGD on 8/20 without any significant abnormalities noted    Above history is obtained from chart review    Past Medical History:        Diagnosis Date    Arthritis     Atrial flutter with rapid ventricular response ([address]     Blood transfusion     CAD (coronary artery disease) 12/2019    NSTEMI, + Troponins    Cellulitis 9/19/2012    Cellulitis of right anterior lower leg 5/10/2018    CHF (congestive heart failure) ([address]     CHF exacerbation ([address]     DVT (deep venous thrombosis) ([address]     right leg    Gout     Hard of hearing 2019    wears hearing aids    Hyperlipidemia     Hypertension     Irregular heart beat     Prostate cancer ([address]     kaycee score 4+3       Past Surgical History:        Procedure Laterality Date    CARDIAC CATHETERIZATION  12/17/2019    Multi Vessel CAD, needs intervention    CARDIAC PACEMAKER PLACEMENT  12/28/2019    CHOLECYSTECTOMY      EYE SURGERY  2/26/13    Left Eye Cataract Removal    JOINT REPLACEMENT 08/19/20  1410 08/19/20  2318 08/20/20  0615   WBC 8.9 11.3*  --    HGB 8.1* 8.1* 7.8*   HCT 26.0* 25.7* 26.8*   MCV 87.2 88.3  --    * 130*  --      Recent Labs     08/19/20  1410 08/19/20  2318 08/20/20  0615    133* 134*   K 3.3* 3.5 3.4*   CL 98* 97* 98*   CO2 22 21 18*   GLUCOSE 92 82 80   MG 2.00 2.00 2.00   BUN 62* 64* 65*   CREATININE 4.0* 3.9* 4.1*   LABGLOM 14* 15* 14*   GFRAA 17* 18* 17*     Urinalysis reveals 100 protein, 21-50 WBCs  Urine culture is pending    Assessment  -ANGEL on CKD in the setting of anemia and likely exacerbation heart failure, cardiorenal syndrome  -Chronic kidney disease stage III likely related to hypertension, renovascular disease and recurrent ATN, follows with Dr. Everardo Rizzo, baseline creatinine of 1.7-1.8  -Anemia with black tarry stool status post EGD on 8/20  -Hypercoagulable state   Status post Kcentra  -Acute hypoxic respiratory failure  -Chronic CHF with reduced EF and severe pulmonary hypertension based on echocardiogram from December 2019  -History of CAD    Plan  -Stop IV fluid  -Keep systolic blood pressure over 110  -IV Lasix 40 mg IV x1 with  -Potassium chloride as ordered-30m EQ IV  -Serial renal panel  -daily wts and strict i/o  -renal dose medications   -avoid nephrotoxins      Thank you for the consultation. Please do not hesitate to call with questions.     Forest Theodore  [facility]  Office: [phone]  Fax:    [phone]

## 2020-08-20 NOTE — H&P
Topics    Alcohol use: Yes     Alcohol/week: 2.0 standard drinks     Types: 2 Cans of beer per week     Family:   Family History   Problem Relation Age of Onset    Heart Disease Mother     Arthritis Sister     Asthma Brother        Scheduled Medications:    sodium chloride flush  10 mL Intravenous 2 times per day       Current Medications:    Prior to Admission medications    Medication Sig Start Date End Date Taking? Authorizing Provider   allopurinol (ZYLOPRIM) 100 MG tablet Take 100 mg by mouth 2 times daily 5/26/20  Yes Historical Provider, MD   simvastatin (ZOCOR) 20 MG tablet Take 20 mg by mouth daily 1/4/13  Yes Historical Provider, MD   omeprazole (PRILOSEC) 20 MG delayed release capsule Take 20 mg by mouth daily   Yes Historical Provider, MD   acetaminophen (TYLENOL) 325 MG tablet Take 650 mg by mouth every 6 hours as needed for Pain   Yes Historical Provider, MD   HYDROcodone-acetaminophen (NORCO) 5-325 MG per tablet Take 1 tablet by mouth every 6 hours as needed for Pain. Yes Historical Provider, MD   furosemide (LASIX) 40 MG tablet Take 20 mg by mouth 2 times daily 5/6/20  Yes Historical Provider, MD   citalopram (CELEXA) 10 MG/5ML solution Take 15 mg by mouth daily   Yes Historical Provider, MD   simvastatin (ZOCOR) 80 MG tablet Take 80 mg by mouth nightly   Yes Historical Provider, MD   metoprolol succinate (TOPROL XL) 50 MG extended release tablet TAKE ONE TABLET BY MOUTH DAILY  Patient taking differently: 25 mg 2 times daily  4/20/20   CALIXTO Roth CNP   potassium chloride (KLOR-CON M) 10 MEQ extended release tablet Take 1 tablet by mouth daily Take 2 tablets today and then one daily after.   Repeat blood work on Friday 1/7/20   CALIXTO Roth CNP   sodium bicarbonate 650 MG tablet Take 1 tablet by mouth 3 times daily 12/24/19   Liz Segura MD   [facility]) 90 MG TABS tablet Take 1 tablet by mouth 2 times daily 12/24/19   Liz Segura MD   hydrALAZINE (APRESOLINE) 50 MG tablet Take 1 tablet by mouth every 8 hours 12/24/19   Mukund Beltran MD   warfarin (COUMADIN) 2 MG tablet Take 2 or 3 mg daily as directed by coumadin clinic. Patient taking differently: Take 2 or 3 mg daily every other day 9/20/18   Modesta Patino MD         Current Facility-Administered Medications:     sodium chloride flush 0.9 % injection 10 mL, 10 mL, Intravenous, 2 times per day, Becka Carrillo PA-C    sodium chloride flush 0.9 % injection 10 mL, 10 mL, Intravenous, PRN, Anuja SCOTT Sullivan    acetaminophen (TYLENOL) tablet 650 mg, 650 mg, Oral, Q6H PRN **OR** acetaminophen (TYLENOL) suppository 650 mg, 650 mg, Rectal, Q6H PRN, Anuja Sullivan PA-C    promethazine (PHENERGAN) tablet 12.5 mg, 12.5 mg, Oral, Q6H PRN **OR** ondansetron (ZOFRAN) injection 4 mg, 4 mg, Intravenous, Q6H PRN, Lawrence Carrillo PA-C    pantoprazole (PROTONIX) 80 mg in sodium chloride 0.9 % 100 mL infusion, 8 mg/hr, Intravenous, Continuous, Lawrence Carrillo PA-C, Last Rate: 10 mL/hr at 08/20/20 0119, 8 mg/hr at 08/20/20 0119    0.9 % sodium chloride infusion, 1,000 mL, Intravenous, Continuous, Jayson Pena MD, Last Rate: 75 mL/hr at 08/19/20 1758, 1,000 mL at 08/19/20 1758      Infusions:    pantoprozole (PROTONIX) infusion 8 mg/hr (08/20/20 0119)    sodium chloride 1,000 mL (08/19/20 1758)     PRN Medications: sodium chloride flush, acetaminophen **OR** acetaminophen, promethazine **OR** ondansetron  Allergies:    Allergies   Allergen Reactions    Sulfa Antibiotics      Joint swelling         Objective:     Physical Exam:   /61   Pulse 110   Temp 98.2 °F (36.8 °C) (Oral)   Resp 18   Ht 5' 10\" (1.778 m)   Wt 231 lb (104.8 kg)   SpO2 97%   BMI 33.15 kg/m²     HEENT: NCAT  Lungs: CTAB  CV: RRR  Abd: soft, ntd  Ext: dpi    Lab and Imaging Review   Labs:  CBC:   Recent Labs     08/19/20  1410 08/19/20  2318 08/20/20  0615   WBC 8.9 11.3*  --    HGB 8.1* 8.1* 7.8*   HCT 26.0* 25.7* 26.8*   MCV 87.2 88.3  --    * 130*  --      BMP:   Recent Labs     08/19/20  1410 08/19/20  2318 08/20/20  0615    133* 134*   K 3.3* 3.5 3.4*   CL 98* 97* 98*   CO2 22 21 18*   BUN 62* 64* 65*   CREATININE 4.0* 3.9* 4.1*     LIVER PROFILE:   Recent Labs     08/19/20  1410 08/19/20  2318   AST 27 26   ALT 10 10   LIPASE 36.0  --    PROT 6.8 7.0   BILITOT 0.6 0.8   ALKPHOS 149* 142*     PT/INR:   Recent Labs     08/19/20  1410 08/19/20  1845 08/20/20  0628   INR 8.73* 1.19* 1.23*       Pre-Procedure Assessment / Plan:  ASA: Class 4 - A patient with an incapacitating systemic disease that is a constant threat to life  Airway: Mallampati: II (soft palate, uvula, fauces visible)  Level of Sedation Plan:Deep sedation  Post Procedure plan: Return to same level of care      Plan:   1. egd    I assessed the patient and find that the patient is in satisfactory condition to proceed with the planned procedure and sedation plan. I have explained the risk, benefits, and alternatives to the procedure; the patient understands and agrees to proceed.        Scarlett Maria  8/20/2020

## 2020-08-20 NOTE — FLOWSHEET NOTE
08/20/20 0920   Vital Signs   Temp 97.8 °F (36.6 °C)   Temp Source Axillary   Pulse 108   Heart Rate Source Monitor   Resp 18   /68   BP Location Left lower arm   BP Upper/Lower Lower   Patient Position Semi fowlers   Level of Consciousness 0   MEWS Score 2   Oxygen Therapy   SpO2 100 %   Pulse Oximeter Device Mode Continuous   O2 Device Simple Mask   FiO2  8 %   Patient returned to floor following EGD procedure. Report received from [place]. Patient alert but drowsy. VS obtained and assessment completed, see flow sheets. Resp e/e, no s/s of distress noted. 8 LPM of oxygen via simple mask in place. Fall precautions in place. Call light within reach.

## 2020-08-20 NOTE — CARE COORDINATION
[facility]  Received referral regarding HC services from [facility]. Sent request to [facility] for [facility] coverage S4. [facility] is able to service pt for [facility]. needs. S4 SN, PT/OT, HHA, MSW. Liaison aware Yovana KUMAR to arrange for hospital bed and pt will have family support at home with spouse and daughter. Liaison to follow up with family prior to discharge.     Electronically signed by Cameron Santa RN on 8/20/2020 at 2:10 PM

## 2020-08-20 NOTE — PROGRESS NOTES
The patient tolerated the procedure well with no adverse events noted, the patient received sedation per anes team during the procedure, report to patients nurse Candie Davila RN via phone.

## 2020-08-20 NOTE — PROGRESS NOTES
Pt is resting with eyes closed, he is arousable, no teeth in mouth, lung sounds are diminished left, wheezes in right, he has edema with pitting +1 in ble, assessed from knee up towards thighs    ble from below knee to foot are wrapped in ace wraps and scds, cap refill in both feet are less than 3 secs    Pt is stable to return to [facility]    Travis baltazar RN stated he called 2[facility] and gave report to pt's nurse, please see his notes for further detail,    No family to update  Transport ordered has been placed    Pt's tele 2 is on pt in phase 2

## 2020-08-21 NOTE — CARE COORDINATION
INTERDISCIPLINARY PLAN OF CARE CONFERENCE    Date/Time: 8/21/2020 10:29 AM  Completed by: Naresh Cunningham Case Management      Patient Name:  Rehan Joy  YOB: 1935  Admitting Diagnosis: Gastrointestinal hemorrhage [K92.2]  Gastrointestinal hemorrhage [K92.2]     Admit Date/Time:  8/19/2020  1:36 PM    Chart reviewed. Interdisciplinary team contacted or reviewed plan related to patient progress and discharge plans. Disciplines included Case Management, Nursing, and Dietitian. Current Status: IP 08/19/2020  PT/OT recommendation for discharge plan of care: ordered  Expected D/C Disposition: home (out of rehab days)  Confirmed plan: Yes confirmed with: daughter, caregiver Britton Shaw (phone)    Discharge Plan Comments: Pt will DC in care of daughter who plans to take a leave from work to care for her parents. She will need some advanced notification as she works Sunday through Wednesday until 6PM. PT/OT for DME eval and recs ordered. +[facility] following for DME needs. Pt will have 20% co-pay on bed. +[facility] following for home care needs. +CM continues to follow.      Home O2 in place on admit: No 97% RA

## 2020-08-21 NOTE — PROGRESS NOTES
Received call from Dr. Ashlee Marquez regarding pt and received new order for NS bolus of 500 ml x 1 hour, then  ml/hr after that. Repeat BMP at 1200 today.

## 2020-08-21 NOTE — PROGRESS NOTES
Placed call to pt's daughter at request of Dr. Miguel A Lazo and made her aware that he would calling her after pt's lab work was complete this morning. Verbalized understanding.

## 2020-08-21 NOTE — PROGRESS NOTES
See Pm shift assessment. A/o x 4. PM medications given. Telemetry showing a fib;  and irreg. Spo2 94% at this time. Patient it talking and drinking water w/o diff with meds; discussed that he is still NPO for now; voiced understanding. Continues to have loose, liquid bm's. Will continue to monitor; call light in reach.

## 2020-08-21 NOTE — PROGRESS NOTES
Physician Progress Note      Nancy Lincoln  CSN #:                  [id number]  :                       1935  ADMIT DATE:       2020 1:36 PM  [facility] [place] DATE:  RESPONDING  PROVIDER #:        Kristyn Cordova MD          QUERY TEXT:    Dear Attending Provider,  Pt with pleural effusion and appears fluid overloaded and Lasix given IV. The   nephrology consult note states: Chronic CHF with reduced EF and severe   pulmonary hypertension based on echocardiogram from 2019 If possible,   please document in progress notes and discharge summary further specificity   regarding the type and acuity of CHF:    The medical record reflects the following:  Risk Factors: htn, CKD, PAF, cardiomyopathy  Clinical Indicators: pleural effusions, fluid overload, chronic CHF w/ reduced   EF  Treatment: IV Lasix, I/O's, daily weight    Thank you for your assistance,  Rosita Valerio RN,BSN,CCDS,CRCR  [phone]  Options provided:  -- Acute on Chronic Systolic CHF/HFrEF  -- Acute on Chronic Systolic and Diastolic CHF  -- Acute Systolic CHF/HFrEF  -- Acute Systolic and Diastolic CHF  -- Chronic Systolic CHF/HFrEF  -- Chronic Systolic and Diastolic CHF  -- Other - I will add my own diagnosis  -- Disagree - Not applicable / Not valid  -- Disagree - Clinically unable to determine / Unknown  -- Refer to Clinical Documentation Reviewer    PROVIDER RESPONSE TEXT:    This patient is in acute on chronic systolic CHF/HFrEF.     Query created by: Leydi Ricardo on 2020 8:36 AM      Electronically signed by:  Kristyn Cordova MD 2020 8:48 AM

## 2020-08-21 NOTE — PROGRESS NOTES
PROGRESS NOTE  S:85 yrs Patient  admitted on 8/19/2020 with Gastrointestinal hemorrhage [K92.2]  Gastrointestinal hemorrhage [K92.2] . Still with diarrhea. Current Hospital Schedued Meds   sodium chloride  1,000 mL Intravenous Once    midodrine  10 mg Oral TID WC    sodium chloride flush  10 mL Intravenous 2 times per day    vancomycin  250 mg Oral 4 times per day    cefTRIAXone (ROCEPHIN) IV  1 g Intravenous Q24H    citalopram  15 mg Oral Daily    sodium bicarbonate  650 mg Oral TID     Current Hospital IV Meds   IV infusion builder 150 mL/hr at 08/21/20 1550    dextrose      pantoprozole (PROTONIX) infusion 8 mg/hr (08/20/20 2353)     Current Hospital PRN Meds  glucose, dextrose, glucagon (rDNA), dextrose, sodium chloride flush, acetaminophen **OR** acetaminophen, promethazine **OR** ondansetron    Exam:   Vitals:    08/21/20 1537   BP: 89/65   Pulse:    Resp:    Temp:    SpO2:      I/O last 3 completed shifts: In: 25 [P.O.:25]  Out: 16 [Urine:15; Stool:1]   General appearance:lethargic  HEENT: PERRLA  Neck: no adenopathy, no carotid bruit, no JVD, supple, symmetrical, trachea midline and thyroid not enlarged, symmetric, no tenderness/mass/nodules  Lungs: clear to auscultation bilaterally  Heart: regular rate and rhythm, S1, S2 normal, no murmur, click, rub or gallop  Abdomen: soft, non-tender; bowel sounds normal; no masses,  no organomegaly  Extremities: extremities normal, atraumatic, no cyanosis or edema     Labs:  CBC:   Recent Labs     08/19/20  1410 08/19/20  2318  08/21/20  0618 08/21/20  0705 08/21/20  1217   WBC 8.9 11.3*  --   --  20.0*  --    HGB 8.1* 8.1*   < > 7.4* 7.4* 7.9*   HCT 26.0* 25.7*   < > 25.4* 24.5* 28.9*   MCV 87.2 88.3  --   --  90.9  --    * 130*  --   --  146  --     < > = values in this interval not displayed.      BMP:   Recent Labs     08/20/20  0615 08/21/20  0618 08/21/20  1217   * 133* 136   K 3.4* 4.0 4.3   CL 98* 98* 100   CO2 18* 15* 13*   PHOS  --  5.4*  --    BUN 65* 72* 71*   CREATININE 4.1* 5.0* 5.1*     LIVER PROFILE:   Recent Labs     08/19/20  1410 08/19/20  2318   AST 27 26   ALT 10 10   LIPASE 36.0  --    PROT 6.8 7.0   BILITOT 0.6 0.8   ALKPHOS 149* 142*     PT/INR:   Recent Labs     08/19/20  1845 08/20/20  0628 08/21/20  0705   INR 1.19* 1.23* 1.37*       IMAGING:  Xr Chest Portable    Result Date: 8/20/2020  EXAMINATION: ONE XRAY VIEW OF THE CHEST 8/20/2020 9:52 pm COMPARISON: Chest radiographs 08/19/2020, 06/15/2020 HISTORY: ORDERING SYSTEM PROVIDED HISTORY: pleural effusions TECHNOLOGIST PROVIDED HISTORY: Reason for exam:->pleural effusions Reason for Exam: pleural effusions FINDINGS: No pneumothorax. Cardiac silhouette is partially obscured but enlarged similar prior exam.  Central vascular congestion, effusions, edema and basilar opacities are similar. Intact dual lead pacemaker with left chest generator. No acute osseous abnormality. Ongoing features of heart failure similar to prior exam, including layering bilateral effusions, central vascular congestion and edema. Hospital Problems           Last Modified POA    Gastrointestinal hemorrhage 8/19/2020 Yes         Impression:  80 yo male with PMH of CAD s/p PCI & NICKIE x 4 (NSTEMI in 12/2019), Ischemic CM, HTN, CKD stage III, PAF AC on Coumadin, hx of complete heart block s/p dual chamber pacemaker and chronic lymphedema who presents with acute blood loss anemia and melena. Noted to be coagulopathic and given Kcentra. Had been admitted previously with stercoral colitis. EGD negative. Being treated for c diff colitis    Recommendation:  1. Continue antibiotics for c diff colitis.   2.  Will follow      Prosper Krueger DO  5:13 PM 8/21/2020            [facility]    [address]      [address] [place] [address]     Phone: [phone]     Fax: [phone]

## 2020-08-21 NOTE — PROGRESS NOTES
Physical Therapy/Occupational Therapy    Hold PT/OT evaluation this afternoon due to low BP (86/55). Will plan to follow-up tomorrow as pt status allows. No charge.     Kris Estrada, PT, DPT #[id number]   Doris Cifuentes, OTR/L [id number]

## 2020-08-21 NOTE — PROGRESS NOTES
Received message to speak with pt's daughter Marixa Jones regarding pt's condition. Lorena not on pt's contact list so writer made her aware that this would have to be verified with her sister and mom. She said then said \"it's Marga, I just need an update\". Made her aware that writer had already spoke with Grazyna this morning. Stated that writer would call [place] to verify that information could be given. Placed call to daughter Angelesamber and made her aware that pt's daughter Marixa Jones had called. Grazyna stated that she would have her mom call her and give an update and that writer did not need to give out information.

## 2020-08-21 NOTE — PROGRESS NOTES
Writer in room at [phone] to assess patient and speak with family. Daughter and spouse at bedside. Discussed disease progression with family and s/s of imminent death. Patient unresponsive at this time and family agreeable to not give next dose of Vancomycin. While writer in room pt started coughing slightly and started having labored breathing. Writer stopped IV fluids and patient gasped for breath and the passed. Family at bedside and aware that pt had passed. Additional RNPat in room with writer to verify, BP and pulse absent. TOD 1700. Notified Dr. Liliane Hdz and order to release body. Family asked for time with patient and would notify staff when ready for post-mortem care.

## 2020-08-21 NOTE — PROGRESS NOTES
Spoke with Dr. Aguilar Loo and received new orders for 1 liter of NS bolus over 60 mins and then resume at 125 ml per hour.

## 2020-08-21 NOTE — PROGRESS NOTES
Reported panic labs of CO2 13 and Creat of 5.1 to Dr. Tamie Josue and Dr. Valencia Tyson, no new orders at this time.

## 2020-08-21 NOTE — PROGRESS NOTES
ondansetron      Data:  CBC:   Recent Labs     08/19/20  1410 08/19/20  2318  08/21/20  0041 08/21/20  0618 08/21/20  0705   WBC 8.9 11.3*  --   --   --  20.0*   HGB 8.1* 8.1*   < > 7.3* 7.4* 7.4*   HCT 26.0* 25.7*   < > 23.7* 25.4* 24.5*   MCV 87.2 88.3  --   --   --  90.9   * 130*  --   --   --  146    < > = values in this interval not displayed. BMP:   Recent Labs     08/19/20  2318 08/20/20  0615 08/21/20  0618   * 134* 133*   K 3.5 3.4* 4.0   CL 97* 98* 98*   CO2 21 18* 15*   PHOS  --   --  5.4*   BUN 64* 65* 72*   CREATININE 3.9* 4.1* 5.0*     LIVER PROFILE:   Recent Labs     08/19/20  1410 08/19/20  2318   AST 27 26   ALT 10 10   LIPASE 36.0  --    BILITOT 0.6 0.8   ALKPHOS 149* 142*     PT/INR:   Recent Labs     08/19/20  1845 08/20/20  0628 08/21/20  0705   PROTIME 13.8* 14.3* 15.9*   INR 1.19* 1.23* 1.37*     Occult Blood Diagnostic Abnormal    08/19/2020  2:00 PM  [address] [place] [address] Lab    Result: POSITIVE   Normal range: Negative       CULTURES  Urine Cx: + Citrobacter amalonaticus   C. Diff: +   Blood Cx: NGTD  GI Panel: pending   COVID-19: NOT DETECTED    Citrobacter amalonaticus (1)     Antibiotic  Interpretation  ANNA  Status     ceFAZolin  Resistant  >=64  mcg/mL      cefepime  Sensitive  <=0.12  mcg/mL      cefTRIAXone  Intermediate  32  mcg/mL      ciprofloxacin  Sensitive  <=0.25  mcg/mL      ertapenem  Sensitive  <=0.12  mcg/mL      gentamicin  Sensitive  <=1  mcg/mL      levofloxacin  Sensitive  <=0.12  mcg/mL      nitrofurantoin  Intermediate  64  mcg/mL      piperacillin-tazobactam  Sensitive  <=4  mcg/mL      trimethoprim-sulfamethoxazole  Sensitive  <=20  mcg/mL           RADIOLOGY  XR CHEST PORTABLE   Final Result   Ongoing features of heart failure similar to prior exam, including layering   bilateral effusions, central vascular congestion and edema.          XR CHEST PORTABLE   Final Result   Findings of CHF with cardiomegaly, pulmonary vascular congestion and bilateral pleural effusions. Assessment/Plan:  Acute GIB   Bleeding due to Coumadin   - noted to have black, tarry stools in ED with anemia  - had recent admission in June for stercoral colitis    - fecal occult stool +   - Diet NPO, GI consult: s/p EGD which showed no ulcerations, erosions, or evidence of upper GIB, \"suspect patient may have had mucosal oozing with coagulopathy, with resolution after reversal\"  - Continue PPI gtt  - Trend H/H      Acute Anemia  - baseline hgb ~8   - Hgb on admission 8.1-->8.1-->7.8-->7.4  - Trend   - Transfuse for hgb <7   - Iron and iron saturations low, TIBC low  - Ferritin levels are pending      TCP  - chronic  - PLT higher than baseline on admission   - Monitor closely     Hypokalemia  - mild   - IV Replacement      UTI  - UA with + LE and WBC  - Urine Cx with Citrobacter amalonaticus, sensitives are pending   - Start Rocephin D#1-->change to Merrem given sensitivities with plans for Invanz at d/c (avoid cipro/levaquin given active C diff infection)       ANGEL on CKD Stage III  AGMA  - Baseline creatinine ~1.7-1.8  - Creatinine on admission 4.0-->3.9-->4.1-->5.0  - nephrology consult  - suspect 2/2 cardiorenal syndrome, IVF stopped, IV Lasix x 1 given   - Renal function worsening, hold diuretics and start IVF    - CO2 dropping, 22-->18-->15, possibly related GI losses with C. Diff? continue home sodium bicarb tablets    Worsening renal failure. Discussed with nephrology. Patient not a candidate for dialysis. I will on bicarb drip. Nephrologist has discussed with patient's family      Bilateral Pleural Effusions   Acute on Chronic sCHF  - appears volume overload   - had NSTEMI in December   - Limited Echo in Dec showed EF 45-50% with severe pulm HTN   - Lasix x 1 given-->hold diuretics with worsening renal function     Coagulopathy   - PT/INR: 103.5/8.73-->13.8/1.19  - s/p Kcentra and Vitamin K in ED given acute GIB   - Monitor     C.  Diff  - + testing  - C. Diff precautions   - PO Vanc    Elevated troponin   CAD s/p stents   - Had NSTEMI in 12/2019  - Home BB and Brilinta on hold   - continue tele monitor   - has had elevated troponin in the past   - initial troponin 0.03--> 0.02-->0.02  - no acute ACS symptoms per chart review    Atrial fibrillation with RVR  Complete heart block   - s/p dual chamber pacemaker   - Rates as high as 120's  - typically on BB, held on admission with mild hypotension-->improved, resume BB but 25 mg BID instead of 50 mg QD with holding parameters   - AC on coumadin, HELD FOR GIB    COVID ruled out   - patient is from SNF  - COVID-19: not detected     HLD  - Continue statin     Chronic Lymphedema  - supportive measures    DVT Prophylaxis:SCDs  Diet: Diet NPO Time Specified Exceptions are: Ice Chips  Code Status: DNR-CCA    Creatinine worsened overnight. Diuretic held and patient now on IVF. BP low, 85/54 this AM, home BB held. Urine sensitivities intermediate to Rocephin-->change to Merrem. Patient from SNF but will be returning home at discharge, will need a hospital bed,  working with patient            Zeeshan Simms MD   8/21/2020     Addendum 4 pm  Patient is declining  He has become poorly responsive now  Hypotensive and hypoglycemic  Discussed with nephrology. He has spoken to the family  Telemetry with episodes of nonsustained V. Tach    Family patient's wife and daughter just came in. I have spoken to them and updated them.   Patient's prognosis is poor condition is poor he is declining in his terminal     maureen status DNR Braden Ferrer MD

## 2020-08-21 NOTE — PROGRESS NOTES
[facility]    Follow up Note           Reason for Consult: ANGEL on CKD  Requesting Physician:  Dr. Guerra Patient history  Patient is quite lethargic, confused  Diarrhea continues  Blood pressure dropped to 70s to 80s  Creatinine has increased to 5    Oligo anuric      ROS: Not able to obtain  PSFH: Discussed with patient's daughter over the phone    Scheduled Meds:   sodium chloride  1,000 mL Intravenous Once    sodium chloride  1,000 mL Intravenous Once    midodrine  10 mg Oral TID WC    sodium chloride flush  10 mL Intravenous 2 times per day    vancomycin  250 mg Oral 4 times per day    cefTRIAXone (ROCEPHIN) IV  1 g Intravenous Q24H    citalopram  15 mg Oral Daily    sodium bicarbonate  650 mg Oral TID     Continuous Infusions:   IV infusion builder      pantoprozole (PROTONIX) infusion 8 mg/hr (08/20/20 1042)     PRN Meds:.sodium chloride flush, acetaminophen **OR** acetaminophen, promethazine **OR** ondansetron      History of Present Illness on 8/20/2020:    81 y.o. yo male with PMH of atrial flutter, CAD, CHF, gout, hypertension, prostate cancer who is admitted for ANGEL, anemia  Patient lives in a nursing home and blood work on 8/17 showed hemoglobin of 7.5. He also had decreased oral intake for several days  It was reported that he started having black tarry stool with INR of 8 and was sent to the emergency room.   Patient received Kcentra and vitamin K in the emergency room  Nephrology has been consulted with a creatinine rise to 4.1, his last creatinine was 1.8 on 7/27/2020  Patient underwent EGD on 8/20 without any significant abnormalities noted    Above history is obtained from chart review    Physical exam:   Constitutional:  VITALS:  BP (!) 86/55   Pulse 76   Temp 97.4 °F (36.3 °C) (Oral)   Resp 18   Ht 5' 10\" (1.778 m)   Wt 240 lb 11.2 oz (109.2 kg)   SpO2 97%   BMI 34.54 kg/m²   Gen: Confused  Skin: no rash, turgor wnl  Heent:  eomi, mmm  Neck: no bruits or jvd noted, thyroid normal  Cardiovascular:  S1, S2 without m/r/g  Respiratory: CTA B without w/r/r; respiratory effort normal  Abdomen:  +bs, soft, nt, nd, no hepatosplenomegaly  Ext: Chronic lymphedema a  Neuro/Psy: AAoriented times 1 ; moves all 4 ext  Musculoskeletal:  Rom, muscular strength intact; digits, nails normal      Data/  Recent Labs     08/19/20  1410 08/19/20  2318  08/21/20  0618 08/21/20  0705 08/21/20  1217   WBC 8.9 11.3*  --   --  20.0*  --    HGB 8.1* 8.1*   < > 7.4* 7.4* 7.9*   HCT 26.0* 25.7*   < > 25.4* 24.5* 28.9*   MCV 87.2 88.3  --   --  90.9  --    * 130*  --   --  146  --     < > = values in this interval not displayed.      Recent Labs     08/19/20  1410 08/19/20  2318 08/20/20  0615 08/21/20  0618 08/21/20  1217    133* 134* 133* 136   K 3.3* 3.5 3.4* 4.0 4.3   CL 98* 97* 98* 98* 100   CO2 22 21 18* 15* 13*   GLUCOSE 92 82 80 82 55*   PHOS  --   --   --  5.4*  --    MG 2.00 2.00 2.00  --   --    BUN 62* 64* 65* 72* 71*   CREATININE 4.0* 3.9* 4.1* 5.0* 5.1*   LABGLOM 14* 15* 14* 11* 11*   GFRAA 17* 18* 17* 13* 13*     Urinalysis reveals 100 protein, 21-50 WBCs  Urine culture is Citrobacter  SARS-CoV-2 PCR negative  C. difficile positive    Assessment  -ANGEL on CKD in the setting of anemia and exacerbation of CHF but in the last 24 hours has worsened with hypotension and diarrhea from C. difficile colitis leading to to worsening of renal failure  -Chronic kidney disease stage III likely related to hypertension, renovascular disease and recurrent ATN, follows with Dr. Fabi Camarillo, baseline creatinine of 1.7-1.8  -Anemia with black tarry stool status post EGD on 8/20  -Hypercoagulable state   Status post Kcentra  -Acute hypoxic respiratory failure  -Chronic CHF with reduced EF and severe pulmonary hypertension based on echocardiogram from December 2019  -History of CAD  -Acute metabolic encephalopathy  -Metabolic acidosis  -C. difficile colitis  -UTI    Plan  -Keep systolic blood pressure over 100  -Normal saline bolus 1 L x 2 along with 500 mL at different times-total of 2.5 L on 8/21  -D5W +150 M EQ per liter of sodium bicarb at 150 mL/h  -Midodrine 10 mg 3 times daily  -Serial renal panel  -daily wts and strict i/o  -renal dose medications   -avoid nephrotoxins    Discussed with patient's daughter over the phone, patient had never wished to be on dialysis in the past.  Given his clinical conditions and chronic comorbidities, especially with severe pulmonary hypertension, he would not be a good dialysis candidate. Both patient's family and I agree that we will continue medical management, if he does not respond, he will need to be made hospice    Thank you for the consultation. Please do not hesitate to call with questions.     Selene Ro  [facility]  Office: [phone]  Fax:    [phone]

## 2020-08-21 NOTE — PROGRESS NOTES
Spoke with Dr. Yessica Rodgers regarding if pt would be able to eat today or if he needed to continue NPO. Received new order for renal diet at this time.

## 2020-08-21 NOTE — PROGRESS NOTES
Family notified writer that they were leaving and it was okay to begin post-mortem care. Post-mortem completed. LDA's removed. Notified [facility] rep and patient not able to donate, okay to release body. Notified [facility] in [place] per family request. Security notified and patient transferred to [address] [place] [address].

## 2020-08-21 NOTE — PROGRESS NOTES
Remains a/o; BP low. Denies dizziness or lightheadedness. , Spo2 96% RA; BP 85/54. Will continue to monitor. Call light in reach.

## 2020-08-21 NOTE — FLOWSHEET NOTE
08/21/20 0811   Vital Signs   Temp 97.4 °F (36.3 °C)   Temp Source Oral   Pulse 104   Heart Rate Source Monitor   Resp 18   BP (!) 93/58   BP Location Right upper arm   BP Upper/Lower Upper   Patient Position Semi fowlers   Level of Consciousness 0   MEWS Score 3   Oxygen Therapy   SpO2 97 %   O2 Device None (Room air)   Patient resting in bed at this time, alert and oriented to person, time, and situation but remains drowsy. Accepted AM medications with sips of water and tolerated well. Assessment completed, see flow sheet. Resp e/e, no s/s of distress noted. Fall precautions in place and call light within reach.

## 2020-08-21 NOTE — PROGRESS NOTES
Writer and aide in pt's room at [phone] to complete shayna care. Patient drowsy but alert at this time. Completed shayna-care and after completion patient with increased lethargy and minimal responsiveness noted. Notified charge nurse of change in condition, assessed vital signs and obtained FSBS. FS 26 mg/dl. Notified Dr. Vena Denver and administered Dextrose 50% IV solution. Family in route at this time.

## 2020-08-21 NOTE — PLAN OF CARE
Problem:  Bowel Function - Altered:  Goal: Bowel elimination is within specified parameters  Description: Bowel elimination is within specified parameters  Outcome: Ongoing     Problem: Fluid Volume - Imbalance:  Goal: Will show no signs and symptoms of excessive bleeding  Description: Will show no signs and symptoms of excessive bleeding  Outcome: Ongoing  Goal: Absence of imbalanced fluid volume signs and symptoms  Description: Absence of imbalanced fluid volume signs and symptoms  Outcome: Ongoing     Problem: Nausea/Vomiting:  Goal: Absence of nausea/vomiting  Description: Absence of nausea/vomiting  Outcome: Ongoing  Goal: Able to drink  Description: Able to drink  Outcome: Ongoing  Goal: Able to eat  Description: Able to eat  Outcome: Ongoing  Goal: Ability to achieve adequate nutritional intake will improve  Description: Ability to achieve adequate nutritional intake will improve  Outcome: Ongoing     Problem: Falls - Risk of:  Goal: Will remain free from falls  Description: Will remain free from falls  Outcome: Ongoing     Problem: Skin Integrity:  Goal: Will show no infection signs and symptoms  Description: Will show no infection signs and symptoms  Outcome: Ongoing

## 2020-08-22 LAB — GI BACTERIAL PATHOGENS BY PCR: NORMAL

## 2020-08-22 NOTE — DISCHARGE SUMMARY
Death Summary     Patient ID:  Dom Garibay  [id number]  81 y.o.  1935    Admit date: 2020    Discharge date and time: 2020    Patient  at 66    Admitting Physician: Rosana Scott MD     Discharge Physician: Anmol Valente    Admission Diagnoses: Gastrointestinal hemorrhage [K92.2]  Gastrointestinal hemorrhage [K92.2]    Discharge Diagnoses: Active Problems:    Gastrointestinal hemorrhage  Resolved Problems:    * No resolved hospital problems. *      Admission Condition: fair    Discharged Condition: patient      Indication for Admission:    per HPI  An 73-year-old  male who is an  extremely poor historian, does not provide a good history whatsoever,  apparently has not been eating or drinking well at the care facility. Was noted to have an abnormal lab of low hemoglobin on 2020. IN  the ER, the patient was noted to have a tarry stool. A good history  cannot be obtained from the patient. Hospital Course:   Admitted for GIB, fluid overload, atrial fibrillation and ANGEL . Patient has been in a nursing home from rehab. COVID-19 ruled out  Patient with worsening renal failure . He was anuric, hypotensive. He declined quickly. Seen by nephrology. IVF given with no improvement . Pt not a candidate for dialysis. Patient became poorly responsive. Code status DNR CCA. I spoke to patient's dtr and wife .   Prognosis poor,  Pt was declining,  Condition terminal  Patient  on 2020       Diagnosis  Acute GIB   Bleeding due to Coumadin   - noted to have black, tarry stools in ED with anemia  - had recent admission in  for stercoral colitis    - fecal occult stool +   - Diet NPO, GI consult: s/p EGD which showed no ulcerations, erosions, or evidence of upper GIB, \"suspect patient may have had mucosal oozing with coagulopathy, with resolution after reversal\"  - Continue PPI gtt  - Trend H/H      Acute Anemia  - baseline hgb ~8   - Hgb on QD with holding parameters   - AC on coumadin, HELD FOR GIB     COVID ruled out   - patient is from [facility]  - COVID-19: not detected      HLD  - Continue statin      Chronic Lymphedema  - supportive measures       Consults: GI and nephrology        Significant Diagnostic Studies:   Data:  CBC:            Recent Labs     08/19/20  1410 08/19/20  2318   08/21/20  0618 08/21/20  0705 08/21/20  1217   WBC 8.9 11.3*  --   --  20.0*  --    RBC 2.98* 2.92*  --   --  2.69*  --    HGB 8.1* 8.1*   < > 7.4* 7.4* 7.9*   HCT 26.0* 25.7*   < > 25.4* 24.5* 28.9*   MCV 87.2 88.3  --   --  90.9  --    RDW 20.3* 20.2*  --   --  20.3*  --    * 130*  --   --  146  --     < > = values in this interval not displayed. BMP:   Recent Labs     08/20/20  0615 08/21/20  0618 08/21/20  1217   * 133* 136   K 3.4* 4.0 4.3   CL 98* 98* 100   CO2 18* 15* 13*   PHOS  --  5.4*  --    BUN 65* 72* 71*   CREATININE 4.1* 5.0* 5.1*     BNP: No results for input(s): BNP in the last 72 hours. PT/INR:   Recent Labs     08/19/20  1845 08/20/20  0628 08/21/20  0705   PROTIME 13.8* 14.3* 15.9*   INR 1.19* 1.23* 1.37*     APTT:   Recent Labs     08/19/20  1410   APTT 63.5*     CARDIAC ENZYMES:   Recent Labs     08/19/20  1410 08/19/20  2318 08/20/20  0615   TROPONINI 0.03* 0.02* 0.02*     FASTING LIPID PANEL:  Lab Results   Component Value Date    CHOL 99 12/19/2019    HDL 34 (L) 12/19/2019    TRIG 93 12/19/2019     LIVER PROFILE:   Recent Labs     08/19/20  1410 08/19/20  2318   AST 27 26   ALT 10 10   BILITOT 0.6 0.8   ALKPHOS 149* 142*       Occult Blood Diagnostic Abnormal    08/19/2020  2:00 PM  [facility]    Result: POSITIVE   Normal range: Negative         CULTURES  Urine Cx: + Citrobacter amalonaticus   C.  Diff: +   Blood Cx: NGTD  GI Panel: pending   COVID-19: NOT DETECTED     Citrobacter amalonaticus (1)              Antibiotic  Interpretation  ANNA  Status      ceFAZolin  Resistant  >=64  mcg/mL        cefepime Sensitive  <=0.12  mcg/mL        cefTRIAXone  Intermediate  32  mcg/mL        ciprofloxacin  Sensitive  <=0.25  mcg/mL        ertapenem  Sensitive  <=0.12  mcg/mL        gentamicin  Sensitive  <=1  mcg/mL        levofloxacin  Sensitive  <=0.12  mcg/mL        nitrofurantoin  Intermediate  64  mcg/mL        piperacillin-tazobactam  Sensitive  <=4  mcg/mL        trimethoprim-sulfamethoxazole  Sensitive  <=20  mcg/mL               RADIOLOGY  XR CHEST PORTABLE   Final Result   Ongoing features of heart failure similar to prior exam, including layering   bilateral effusions, central vascular congestion and edema.           XR CHEST PORTABLE   Final Result   Findings of CHF with cardiomegaly, pulmonary vascular congestion and   bilateral pleural effusions.                     Signed:  Gavin Dos Santos

## 2020-08-23 LAB
BLOOD CULTURE, ROUTINE: NORMAL
CULTURE, BLOOD 2: NORMAL

## 2023-07-14 NOTE — PROGRESS NOTES
Patient presents with history of A-Fib and recurrent DVT , on long term anticoagulation with warfarin. He is here for anticoagulation monitoring and adjustment. 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 Denies bleeding/bruising  Denies blood in urine/stool  No changes in Rx/OTC/Herbal supplementation. Patient is not a smoker, he did state that he drinks one beer nightly. Pt denies any missed doses. Pt indicates that he is not a big vegetable eater. Recently hospitalized. Has had pacemaker placed as well as 4 stents. Has been back on home warfarin dose for about 1.5 weeks    INR 1.4 is below acceptable therapeutic range (goal range of 2-3). Recommend to take 4 mg today, then increase dose to 2 mg daily, except 3 mg on Sun/Wed/Fri  He has Warfarin 4, 3, and 2 mg tablets at home. Will continue to monitor and check INR in 2 weeks (patient normally request 6 weeks)  Dosing card with dose and next appt time was given.   Return to clinic: 1/21 @ 1 pm      Etelvina RosaD 12:54 PM 1/7/20 Xolair Pregnancy And Lactation Text: This medication is Pregnancy Category B and is considered safe during pregnancy. This medication is excreted in breast milk.

## 2023-11-30 NOTE — ED NOTES
Patient daughter called [facility]. [phone].   ETA 11:15     Desire Quinn RN  05/20/20 0145
acuteness of illness/interest in learning/motivation to learn

## (undated) DEVICE — ELECTRODE ECG MONITR FOAM TEAR DROP ADLT RED

## (undated) DEVICE — CONMED SCOPE SAVER BITE BLOCK, 20X27 MM: Brand: SCOPE SAVER

## (undated) DEVICE — ENDO CARRY-ON PROCEDURE KIT INCLUDES SUCTION TUBING, LUBRICANT, GAUZE, BIOHAZARD STICKER, TRANSPORT PAD AND INTERCEPT BEDSIDE KIT.: Brand: ENDO CARRY-ON PROCEDURE KIT

## (undated) DEVICE — CANNULA,OXY,ADULT,SUPERSOFT,W/7'TUB,SC: Brand: MEDLINE INDUSTRIES, INC.